# Patient Record
Sex: FEMALE | Race: OTHER | HISPANIC OR LATINO | URBAN - METROPOLITAN AREA
[De-identification: names, ages, dates, MRNs, and addresses within clinical notes are randomized per-mention and may not be internally consistent; named-entity substitution may affect disease eponyms.]

---

## 2017-06-19 ENCOUNTER — EMERGENCY (EMERGENCY)
Facility: HOSPITAL | Age: 37
LOS: 1 days | Discharge: HOME | End: 2017-06-19
Attending: EMERGENCY MEDICINE
Payer: COMMERCIAL

## 2017-06-19 VITALS
RESPIRATION RATE: 16 BRPM | HEART RATE: 71 BPM | OXYGEN SATURATION: 99 % | WEIGHT: 162.04 LBS | DIASTOLIC BLOOD PRESSURE: 78 MMHG | HEIGHT: 61 IN | TEMPERATURE: 99 F | SYSTOLIC BLOOD PRESSURE: 103 MMHG

## 2017-06-19 VITALS
TEMPERATURE: 98 F | DIASTOLIC BLOOD PRESSURE: 75 MMHG | SYSTOLIC BLOOD PRESSURE: 110 MMHG | OXYGEN SATURATION: 100 % | RESPIRATION RATE: 16 BRPM | HEART RATE: 70 BPM

## 2017-06-19 DIAGNOSIS — N39.0 URINARY TRACT INFECTION, SITE NOT SPECIFIED: ICD-10-CM

## 2017-06-19 DIAGNOSIS — J45.909 UNSPECIFIED ASTHMA, UNCOMPLICATED: ICD-10-CM

## 2017-06-19 DIAGNOSIS — R10.9 UNSPECIFIED ABDOMINAL PAIN: ICD-10-CM

## 2017-06-19 DIAGNOSIS — Z91.018 ALLERGY TO OTHER FOODS: ICD-10-CM

## 2017-06-19 LAB
ANION GAP SERPL CALC-SCNC: 7 MMOL/L — SIGNIFICANT CHANGE UP (ref 5–17)
APPEARANCE UR: CLEAR — SIGNIFICANT CHANGE UP
BASOPHILS # BLD AUTO: 0.1 K/UL — SIGNIFICANT CHANGE UP (ref 0–0.2)
BASOPHILS NFR BLD AUTO: 1.3 % — SIGNIFICANT CHANGE UP (ref 0–2)
BILIRUB UR-MCNC: NEGATIVE — SIGNIFICANT CHANGE UP
BUN SERPL-MCNC: 13 MG/DL — SIGNIFICANT CHANGE UP (ref 7–18)
CALCIUM SERPL-MCNC: 8.9 MG/DL — SIGNIFICANT CHANGE UP (ref 8.4–10.5)
CHLORIDE SERPL-SCNC: 105 MMOL/L — SIGNIFICANT CHANGE UP (ref 96–108)
CO2 SERPL-SCNC: 25 MMOL/L — SIGNIFICANT CHANGE UP (ref 22–31)
COLOR SPEC: YELLOW — SIGNIFICANT CHANGE UP
CREAT SERPL-MCNC: 0.65 MG/DL — SIGNIFICANT CHANGE UP (ref 0.5–1.3)
DIFF PNL FLD: ABNORMAL
EOSINOPHIL # BLD AUTO: 0.1 K/UL — SIGNIFICANT CHANGE UP (ref 0–0.5)
EOSINOPHIL NFR BLD AUTO: 1.2 % — SIGNIFICANT CHANGE UP (ref 0–6)
GLUCOSE SERPL-MCNC: 102 MG/DL — HIGH (ref 70–99)
GLUCOSE UR QL: NEGATIVE — SIGNIFICANT CHANGE UP
HCG UR QL: NEGATIVE — SIGNIFICANT CHANGE UP
HCT VFR BLD CALC: 43.9 % — SIGNIFICANT CHANGE UP (ref 34.5–45)
HGB BLD-MCNC: 15.2 G/DL — SIGNIFICANT CHANGE UP (ref 11.5–15.5)
KETONES UR-MCNC: NEGATIVE — SIGNIFICANT CHANGE UP
LACTATE SERPL-SCNC: 1.1 MMOL/L — SIGNIFICANT CHANGE UP (ref 0.7–2)
LEUKOCYTE ESTERASE UR-ACNC: ABNORMAL
LYMPHOCYTES # BLD AUTO: 2.1 K/UL — SIGNIFICANT CHANGE UP (ref 1–3.3)
LYMPHOCYTES # BLD AUTO: 23.5 % — SIGNIFICANT CHANGE UP (ref 13–44)
MCHC RBC-ENTMCNC: 32.7 PG — SIGNIFICANT CHANGE UP (ref 27–34)
MCHC RBC-ENTMCNC: 34.6 GM/DL — SIGNIFICANT CHANGE UP (ref 32–36)
MCV RBC AUTO: 94.3 FL — SIGNIFICANT CHANGE UP (ref 80–100)
MONOCYTES # BLD AUTO: 0.5 K/UL — SIGNIFICANT CHANGE UP (ref 0–0.9)
MONOCYTES NFR BLD AUTO: 6.2 % — SIGNIFICANT CHANGE UP (ref 2–14)
NEUTROPHILS # BLD AUTO: 5.9 K/UL — SIGNIFICANT CHANGE UP (ref 1.8–7.4)
NEUTROPHILS NFR BLD AUTO: 67.8 % — SIGNIFICANT CHANGE UP (ref 43–77)
NITRITE UR-MCNC: NEGATIVE — SIGNIFICANT CHANGE UP
PH UR: 6 — SIGNIFICANT CHANGE UP (ref 5–8)
PLATELET # BLD AUTO: 341 K/UL — SIGNIFICANT CHANGE UP (ref 150–400)
POTASSIUM SERPL-MCNC: 4 MMOL/L — SIGNIFICANT CHANGE UP (ref 3.5–5.3)
POTASSIUM SERPL-SCNC: 4 MMOL/L — SIGNIFICANT CHANGE UP (ref 3.5–5.3)
PROT UR-MCNC: 15
RBC # BLD: 4.65 M/UL — SIGNIFICANT CHANGE UP (ref 3.8–5.2)
RBC # FLD: 13.1 % — SIGNIFICANT CHANGE UP (ref 10.3–14.5)
SODIUM SERPL-SCNC: 137 MMOL/L — SIGNIFICANT CHANGE UP (ref 135–145)
SP GR SPEC: 1.02 — SIGNIFICANT CHANGE UP (ref 1.01–1.02)
UROBILINOGEN FLD QL: NEGATIVE — SIGNIFICANT CHANGE UP
WBC # BLD: 8.7 K/UL — SIGNIFICANT CHANGE UP (ref 3.8–10.5)
WBC # FLD AUTO: 8.7 K/UL — SIGNIFICANT CHANGE UP (ref 3.8–10.5)

## 2017-06-19 PROCEDURE — 83605 ASSAY OF LACTIC ACID: CPT

## 2017-06-19 PROCEDURE — 74176 CT ABD & PELVIS W/O CONTRAST: CPT | Mod: 26

## 2017-06-19 PROCEDURE — 81025 URINE PREGNANCY TEST: CPT

## 2017-06-19 PROCEDURE — 99285 EMERGENCY DEPT VISIT HI MDM: CPT | Mod: 25

## 2017-06-19 PROCEDURE — 74176 CT ABD & PELVIS W/O CONTRAST: CPT

## 2017-06-19 PROCEDURE — 87040 BLOOD CULTURE FOR BACTERIA: CPT

## 2017-06-19 PROCEDURE — 96374 THER/PROPH/DIAG INJ IV PUSH: CPT

## 2017-06-19 PROCEDURE — 81001 URINALYSIS AUTO W/SCOPE: CPT

## 2017-06-19 PROCEDURE — 87086 URINE CULTURE/COLONY COUNT: CPT

## 2017-06-19 PROCEDURE — 85027 COMPLETE CBC AUTOMATED: CPT

## 2017-06-19 PROCEDURE — 80048 BASIC METABOLIC PNL TOTAL CA: CPT

## 2017-06-19 RX ORDER — ALBUTEROL 90 UG/1
0 AEROSOL, METERED ORAL
Qty: 0 | Refills: 0 | COMMUNITY

## 2017-06-19 RX ORDER — SODIUM CHLORIDE 9 MG/ML
1000 INJECTION INTRAMUSCULAR; INTRAVENOUS; SUBCUTANEOUS ONCE
Qty: 0 | Refills: 0 | Status: COMPLETED | OUTPATIENT
Start: 2017-06-19 | End: 2017-06-19

## 2017-06-19 RX ORDER — SODIUM CHLORIDE 9 MG/ML
3 INJECTION INTRAMUSCULAR; INTRAVENOUS; SUBCUTANEOUS ONCE
Qty: 0 | Refills: 0 | Status: COMPLETED | OUTPATIENT
Start: 2017-06-19 | End: 2017-06-19

## 2017-06-19 RX ORDER — CEFTRIAXONE 500 MG/1
1 INJECTION, POWDER, FOR SOLUTION INTRAMUSCULAR; INTRAVENOUS ONCE
Qty: 0 | Refills: 0 | Status: COMPLETED | OUTPATIENT
Start: 2017-06-19 | End: 2017-06-19

## 2017-06-19 RX ORDER — CEFUROXIME AXETIL 250 MG
1 TABLET ORAL
Qty: 10 | Refills: 0 | OUTPATIENT
Start: 2017-06-19 | End: 2017-06-24

## 2017-06-19 RX ORDER — ACETAMINOPHEN 500 MG
2 TABLET ORAL
Qty: 20 | Refills: 0 | OUTPATIENT
Start: 2017-06-19

## 2017-06-19 RX ADMIN — SODIUM CHLORIDE 3000 MILLILITER(S): 9 INJECTION INTRAMUSCULAR; INTRAVENOUS; SUBCUTANEOUS at 20:44

## 2017-06-19 RX ADMIN — CEFTRIAXONE 100 GRAM(S): 500 INJECTION, POWDER, FOR SOLUTION INTRAMUSCULAR; INTRAVENOUS at 21:44

## 2017-06-19 RX ADMIN — SODIUM CHLORIDE 3 MILLILITER(S): 9 INJECTION INTRAMUSCULAR; INTRAVENOUS; SUBCUTANEOUS at 21:44

## 2017-06-19 NOTE — ED PROVIDER NOTE - PHYSICAL EXAMINATION
Left flank tenderenss, No cervical, thoracic or lumbosacral midline bony deformities,  +rotation and flexion-extension of neck and truncal area intact.

## 2017-06-19 NOTE — ED PROVIDER NOTE - MEDICAL DECISION MAKING DETAILS
Pt feels better, no distress, Pt is well appearing walking with normal gait, stable for discharge and follow up with medical doctor. Pt educated on care and need for follow up. Discussed anticipatory guidance and return precautions. Questions answered. I had a detailed discussion with the patient and/or guardian regarding the historical points, exam findings, and any diagnostic results supporting the discharge diagnosis. Rx Ceftin, f/u w/ Dr. Auguste.

## 2017-06-21 LAB
CULTURE RESULTS: SIGNIFICANT CHANGE UP
SPECIMEN SOURCE: SIGNIFICANT CHANGE UP

## 2017-06-25 LAB
CULTURE RESULTS: SIGNIFICANT CHANGE UP
CULTURE RESULTS: SIGNIFICANT CHANGE UP
SPECIMEN SOURCE: SIGNIFICANT CHANGE UP
SPECIMEN SOURCE: SIGNIFICANT CHANGE UP

## 2017-07-11 ENCOUNTER — EMERGENCY (EMERGENCY)
Facility: HOSPITAL | Age: 37
LOS: 1 days | Discharge: HOME | End: 2017-07-11
Attending: INTERNAL MEDICINE
Payer: COMMERCIAL

## 2017-07-11 VITALS
RESPIRATION RATE: 20 BRPM | DIASTOLIC BLOOD PRESSURE: 50 MMHG | WEIGHT: 160.06 LBS | OXYGEN SATURATION: 99 % | SYSTOLIC BLOOD PRESSURE: 107 MMHG | TEMPERATURE: 103 F | HEIGHT: 61 IN | HEART RATE: 101 BPM

## 2017-07-11 VITALS
DIASTOLIC BLOOD PRESSURE: 64 MMHG | TEMPERATURE: 100 F | SYSTOLIC BLOOD PRESSURE: 98 MMHG | HEART RATE: 97 BPM | RESPIRATION RATE: 20 BRPM | OXYGEN SATURATION: 99 %

## 2017-07-11 DIAGNOSIS — R07.2 PRECORDIAL PAIN: ICD-10-CM

## 2017-07-11 DIAGNOSIS — J02.9 ACUTE PHARYNGITIS, UNSPECIFIED: ICD-10-CM

## 2017-07-11 DIAGNOSIS — J45.909 UNSPECIFIED ASTHMA, UNCOMPLICATED: ICD-10-CM

## 2017-07-11 DIAGNOSIS — Z91.018 ALLERGY TO OTHER FOODS: ICD-10-CM

## 2017-07-11 PROCEDURE — 99284 EMERGENCY DEPT VISIT MOD MDM: CPT | Mod: 25

## 2017-07-11 PROCEDURE — 93005 ELECTROCARDIOGRAM TRACING: CPT

## 2017-07-11 PROCEDURE — 96374 THER/PROPH/DIAG INJ IV PUSH: CPT

## 2017-07-11 PROCEDURE — 99285 EMERGENCY DEPT VISIT HI MDM: CPT

## 2017-07-11 PROCEDURE — 96375 TX/PRO/DX INJ NEW DRUG ADDON: CPT

## 2017-07-11 RX ORDER — AMOXICILLIN 250 MG/5ML
500 SUSPENSION, RECONSTITUTED, ORAL (ML) ORAL ONCE
Qty: 0 | Refills: 0 | Status: COMPLETED | OUTPATIENT
Start: 2017-07-11 | End: 2017-07-11

## 2017-07-11 RX ORDER — DEXAMETHASONE 0.5 MG/5ML
6 ELIXIR ORAL ONCE
Qty: 0 | Refills: 0 | Status: COMPLETED | OUTPATIENT
Start: 2017-07-11 | End: 2017-07-11

## 2017-07-11 RX ORDER — SODIUM CHLORIDE 9 MG/ML
2000 INJECTION INTRAMUSCULAR; INTRAVENOUS; SUBCUTANEOUS ONCE
Qty: 0 | Refills: 0 | Status: COMPLETED | OUTPATIENT
Start: 2017-07-11 | End: 2017-07-11

## 2017-07-11 RX ORDER — KETOROLAC TROMETHAMINE 30 MG/ML
30 SYRINGE (ML) INJECTION ONCE
Qty: 0 | Refills: 0 | Status: DISCONTINUED | OUTPATIENT
Start: 2017-07-11 | End: 2017-07-11

## 2017-07-11 RX ORDER — ACETAMINOPHEN 500 MG
1000 TABLET ORAL ONCE
Qty: 0 | Refills: 0 | Status: COMPLETED | OUTPATIENT
Start: 2017-07-11 | End: 2017-07-11

## 2017-07-11 RX ORDER — AMOXICILLIN 250 MG/5ML
1 SUSPENSION, RECONSTITUTED, ORAL (ML) ORAL
Qty: 14 | Refills: 0 | OUTPATIENT
Start: 2017-07-11 | End: 2017-07-18

## 2017-07-11 RX ADMIN — Medication 500 MILLIGRAM(S): at 19:33

## 2017-07-11 RX ADMIN — Medication 1000 MILLIGRAM(S): at 19:33

## 2017-07-11 RX ADMIN — SODIUM CHLORIDE 2000 MILLILITER(S): 9 INJECTION INTRAMUSCULAR; INTRAVENOUS; SUBCUTANEOUS at 19:34

## 2017-07-11 RX ADMIN — Medication 6 MILLIGRAM(S): at 19:33

## 2017-07-11 RX ADMIN — Medication 30 MILLIGRAM(S): at 19:33

## 2017-07-11 RX ADMIN — Medication 30 MILLIGRAM(S): at 19:34

## 2017-07-11 NOTE — ED PROVIDER NOTE - CHPI ED SYMPTOMS POS
HA, general body aches, sore throat, mid-sternal chest pain, nausea, pain under L arm, pain with swallowing/CHILLS/FEVER

## 2017-07-11 NOTE — ED PROVIDER NOTE - HEME LYMPH, MLM
No adenopathy or splenomegaly. No cervical or inguinal lymphadenopathy. Minimal sub-centimeter, tender, mobile axial lymph node and anterior cervical chain.

## 2017-07-11 NOTE — ED PROVIDER NOTE - PROGRESS NOTE DETAILS
Feeling much better s/p IVF/meds. Pt ambulatory, tolerating PO, requesting d/c home. She will f/u with PMD in 2 days.

## 2017-07-11 NOTE — ED PROVIDER NOTE - OBJECTIVE STATEMENT
38 y/o F pt with PMHx of Asthma (on ProAir) and no significant PSHx presents to ED c/o fever (Tmax 103.6 F), chills, HA, general body aches, sore throat, and mid-sternal CP since yesterday. Pt also reports associated nausea, pain under L arm, and pain with swallowing. Pt took Advil x2 yesterday, as well as Nyquil x2 today with no relief of sx's. Pt denies cough, vomiting, abd pain, or any other complaints. Pt also denies having similar CP in the past. 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 NKDA. Pharmacy: [facility] (zip code: [id number]).

## 2017-07-11 NOTE — ED PROVIDER NOTE - MEDICAL DECISION MAKING DETAILS
38 y/o F pt presents with exudative pharyngitis. WIll give fluids, pain medication, anti-pyretic, steroid, Abx, and d/c home with outpatient f/u.

## 2018-02-07 ENCOUNTER — TRANSCRIPTION ENCOUNTER (OUTPATIENT)
Age: 38
End: 2018-02-07

## 2018-03-29 ENCOUNTER — TRANSCRIPTION ENCOUNTER (OUTPATIENT)
Age: 38
End: 2018-03-29

## 2018-12-04 ENCOUNTER — EMERGENCY (EMERGENCY)
Facility: HOSPITAL | Age: 38
LOS: 1 days | Discharge: HOME | End: 2018-12-04
Attending: EMERGENCY MEDICINE
Payer: COMMERCIAL

## 2018-12-04 VITALS
WEIGHT: 160.06 LBS | DIASTOLIC BLOOD PRESSURE: 69 MMHG | HEIGHT: 61 IN | SYSTOLIC BLOOD PRESSURE: 105 MMHG | TEMPERATURE: 98 F | HEART RATE: 66 BPM | OXYGEN SATURATION: 98 % | RESPIRATION RATE: 18 BRPM

## 2018-12-04 PROCEDURE — 99285 EMERGENCY DEPT VISIT HI MDM: CPT | Mod: 25

## 2018-12-04 PROCEDURE — 94640 AIRWAY INHALATION TREATMENT: CPT

## 2018-12-04 PROCEDURE — 99284 EMERGENCY DEPT VISIT MOD MDM: CPT

## 2018-12-04 RX ORDER — IPRATROPIUM/ALBUTEROL SULFATE 18-103MCG
3 AEROSOL WITH ADAPTER (GRAM) INHALATION
Qty: 0 | Refills: 0 | Status: COMPLETED | OUTPATIENT
Start: 2018-12-04 | End: 2018-12-04

## 2018-12-04 RX ORDER — FLUTICASONE PROPIONATE AND SALMETEROL 50; 250 UG/1; UG/1
1 POWDER ORAL; RESPIRATORY (INHALATION)
Qty: 1 | Refills: 0 | OUTPATIENT
Start: 2018-12-04 | End: 2019-01-02

## 2018-12-04 RX ORDER — ALBUTEROL 90 UG/1
2 AEROSOL, METERED ORAL
Qty: 1 | Refills: 0 | OUTPATIENT
Start: 2018-12-04

## 2018-12-04 RX ADMIN — Medication 3 MILLILITER(S): at 11:11

## 2018-12-04 RX ADMIN — Medication 3 MILLILITER(S): at 09:55

## 2018-12-04 RX ADMIN — Medication 60 MILLIGRAM(S): at 10:13

## 2018-12-04 RX ADMIN — Medication 3 MILLILITER(S): at 09:38

## 2018-12-04 NOTE — ED PROVIDER NOTE - PROGRESS NOTE DETAILS
Pt has improved and wheezing and shortness of breath have resolved. Pre-treatment peak flow was 240 and  post treatment was 500.

## 2018-12-04 NOTE — ED PROVIDER NOTE - MEDICAL DECISION MAKING DETAILS
37 y/o F with a PMHx of asthma presents to the ED with asthma exacerbation. Pt's symptoms have improved after Duoneb and albuterol treatments. Will prescribe Advair, albuterol, and steroids. Pt is to     f/u with PCP in 1-2 days. Pt should return to the ED if feeling worse, if not feeling better, or for any new or worsening symptoms.

## 2018-12-04 NOTE — ED PROVIDER NOTE - OBJECTIVE STATEMENT
37 y/o F with a significant PMHx of asthma and no significant PSHx presents to the ED with c/o wheezing, SOB, and dry cough x 1 week. Pt states she initially had relief with albuterol but symptoms recurred. Pt notes she ran out of Advair last month. Pt reports that symptoms are similar to her typical asthma exacerbations. Pt endorses she has never been in ICU or intubated. Pt denies fevers, chest pain, nausea, vomiting, diarrhea, abd pain, or any other complaints. NKDA.

## 2018-12-04 NOTE — ED ADULT TRIAGE NOTE - CHIEF COMPLAINT QUOTE
as per the pt " my proair for the asthma is not working  I have asthma attack for 1 week ' peak flow 240

## 2019-12-04 NOTE — ED ADULT NURSE NOTE - CAS DISCH ACCOMP BY
I returned the patient's phone call and left a voicemail message stating that the company's I utilize are [employer] the models are not the same as 2018, they have changed since then.   Self

## 2020-03-15 ENCOUNTER — EMERGENCY (EMERGENCY)
Facility: HOSPITAL | Age: 40
LOS: 1 days | Discharge: HOME | End: 2020-03-15
Attending: EMERGENCY MEDICINE
Payer: COMMERCIAL

## 2020-03-15 VITALS
TEMPERATURE: 99 F | HEART RATE: 88 BPM | OXYGEN SATURATION: 98 % | WEIGHT: 162.04 LBS | DIASTOLIC BLOOD PRESSURE: 64 MMHG | SYSTOLIC BLOOD PRESSURE: 115 MMHG | RESPIRATION RATE: 16 BRPM | HEIGHT: 61 IN

## 2020-03-15 PROCEDURE — 99284 EMERGENCY DEPT VISIT MOD MDM: CPT

## 2020-03-15 PROCEDURE — 71046 X-RAY EXAM CHEST 2 VIEWS: CPT | Mod: 26

## 2020-03-15 RX ORDER — ACETAMINOPHEN 500 MG
650 TABLET ORAL ONCE
Refills: 0 | Status: COMPLETED | OUTPATIENT
Start: 2020-03-15 | End: 2020-03-15

## 2020-03-15 RX ADMIN — Medication 650 MILLIGRAM(S): at 21:20

## 2020-03-15 NOTE — ED ADULT TRIAGE NOTE - WEIGHT IN KG
[FreeTextEntry8] : Pt has swelling and tenderness over the Right MCP joint for 2 days.\par Also here to check BP after HCTZ was added at the last visit.
73.5

## 2020-03-15 NOTE — ED ADULT NURSE NOTE - NSIMPLEMENTINTERV_GEN_ALL_ED
Implemented All Universal Safety Interventions:  [place] to call system. Call bell, personal items and telephone within reach. Instruct patient to call for assistance. Room bathroom lighting operational. Non-slip footwear when patient is off stretcher. Physically safe environment: no spills, clutter or unnecessary equipment. Stretcher in lowest position, wheels locked, appropriate side rails in place.

## 2020-03-15 NOTE — ED ADULT NURSE NOTE - OBJECTIVE STATEMENT
pt is here for fever.  pt stated that fever starting a few hours ago, c/o chills and body ache, H/O Asthma, denied N/V/D or sob, c/o body ache 7-9/10, no distress noted at this time.

## 2020-03-16 VITALS
HEART RATE: 80 BPM | SYSTOLIC BLOOD PRESSURE: 113 MMHG | TEMPERATURE: 99 F | RESPIRATION RATE: 18 BRPM | OXYGEN SATURATION: 100 % | DIASTOLIC BLOOD PRESSURE: 67 MMHG

## 2020-03-16 LAB
ALBUMIN SERPL ELPH-MCNC: 3.8 G/DL — SIGNIFICANT CHANGE UP (ref 3.5–5)
ALP SERPL-CCNC: 71 U/L — SIGNIFICANT CHANGE UP (ref 40–120)
ALT FLD-CCNC: 27 U/L DA — SIGNIFICANT CHANGE UP (ref 10–60)
ANION GAP SERPL CALC-SCNC: 7 MMOL/L — SIGNIFICANT CHANGE UP (ref 5–17)
AST SERPL-CCNC: 17 U/L — SIGNIFICANT CHANGE UP (ref 10–40)
BASOPHILS # BLD AUTO: 0.05 K/UL — SIGNIFICANT CHANGE UP (ref 0–0.2)
BASOPHILS NFR BLD AUTO: 0.9 % — SIGNIFICANT CHANGE UP (ref 0–2)
BILIRUB SERPL-MCNC: 0.6 MG/DL — SIGNIFICANT CHANGE UP (ref 0.2–1.2)
BUN SERPL-MCNC: 15 MG/DL — SIGNIFICANT CHANGE UP (ref 7–18)
CALCIUM SERPL-MCNC: 8.5 MG/DL — SIGNIFICANT CHANGE UP (ref 8.4–10.5)
CHLORIDE SERPL-SCNC: 106 MMOL/L — SIGNIFICANT CHANGE UP (ref 96–108)
CO2 SERPL-SCNC: 25 MMOL/L — SIGNIFICANT CHANGE UP (ref 22–31)
CREAT SERPL-MCNC: 0.71 MG/DL — SIGNIFICANT CHANGE UP (ref 0.5–1.3)
D DIMER BLD IA.RAPID-MCNC: <150 NG/ML DDU — SIGNIFICANT CHANGE UP
EOSINOPHIL # BLD AUTO: 0.11 K/UL — SIGNIFICANT CHANGE UP (ref 0–0.5)
EOSINOPHIL NFR BLD AUTO: 1.9 % — SIGNIFICANT CHANGE UP (ref 0–6)
GLUCOSE SERPL-MCNC: 91 MG/DL — SIGNIFICANT CHANGE UP (ref 70–99)
HCT VFR BLD CALC: 40.9 % — SIGNIFICANT CHANGE UP (ref 34.5–45)
HGB BLD-MCNC: 13.7 G/DL — SIGNIFICANT CHANGE UP (ref 11.5–15.5)
IMM GRANULOCYTES NFR BLD AUTO: 0.4 % — SIGNIFICANT CHANGE UP (ref 0–1.5)
LYMPHOCYTES # BLD AUTO: 1.27 K/UL — SIGNIFICANT CHANGE UP (ref 1–3.3)
LYMPHOCYTES # BLD AUTO: 22.4 % — SIGNIFICANT CHANGE UP (ref 13–44)
MCHC RBC-ENTMCNC: 31.4 PG — SIGNIFICANT CHANGE UP (ref 27–34)
MCHC RBC-ENTMCNC: 33.5 GM/DL — SIGNIFICANT CHANGE UP (ref 32–36)
MCV RBC AUTO: 93.6 FL — SIGNIFICANT CHANGE UP (ref 80–100)
MONOCYTES # BLD AUTO: 0.84 K/UL — SIGNIFICANT CHANGE UP (ref 0–0.9)
MONOCYTES NFR BLD AUTO: 14.8 % — HIGH (ref 2–14)
NEUTROPHILS # BLD AUTO: 3.38 K/UL — SIGNIFICANT CHANGE UP (ref 1.8–7.4)
NEUTROPHILS NFR BLD AUTO: 59.6 % — SIGNIFICANT CHANGE UP (ref 43–77)
NRBC # BLD: 0 /100 WBCS — SIGNIFICANT CHANGE UP (ref 0–0)
PLATELET # BLD AUTO: 288 K/UL — SIGNIFICANT CHANGE UP (ref 150–400)
POTASSIUM SERPL-MCNC: 4.1 MMOL/L — SIGNIFICANT CHANGE UP (ref 3.5–5.3)
POTASSIUM SERPL-SCNC: 4.1 MMOL/L — SIGNIFICANT CHANGE UP (ref 3.5–5.3)
PROT SERPL-MCNC: 7.5 G/DL — SIGNIFICANT CHANGE UP (ref 6–8.3)
RBC # BLD: 4.37 M/UL — SIGNIFICANT CHANGE UP (ref 3.8–5.2)
RBC # FLD: 13.4 % — SIGNIFICANT CHANGE UP (ref 10.3–14.5)
SODIUM SERPL-SCNC: 138 MMOL/L — SIGNIFICANT CHANGE UP (ref 135–145)
WBC # BLD: 5.67 K/UL — SIGNIFICANT CHANGE UP (ref 3.8–10.5)
WBC # FLD AUTO: 5.67 K/UL — SIGNIFICANT CHANGE UP (ref 3.8–10.5)

## 2020-03-16 PROCEDURE — 36415 COLL VENOUS BLD VENIPUNCTURE: CPT

## 2020-03-16 PROCEDURE — 85379 FIBRIN DEGRADATION QUANT: CPT

## 2020-03-16 PROCEDURE — 85027 COMPLETE CBC AUTOMATED: CPT

## 2020-03-16 PROCEDURE — 84702 CHORIONIC GONADOTROPIN TEST: CPT

## 2020-03-16 PROCEDURE — 71046 X-RAY EXAM CHEST 2 VIEWS: CPT

## 2020-03-16 PROCEDURE — 96374 THER/PROPH/DIAG INJ IV PUSH: CPT

## 2020-03-16 PROCEDURE — 99284 EMERGENCY DEPT VISIT MOD MDM: CPT | Mod: 25

## 2020-03-16 PROCEDURE — 80053 COMPREHEN METABOLIC PANEL: CPT

## 2020-03-16 RX ORDER — SODIUM CHLORIDE 9 MG/ML
1000 INJECTION INTRAMUSCULAR; INTRAVENOUS; SUBCUTANEOUS ONCE
Refills: 0 | Status: COMPLETED | OUTPATIENT
Start: 2020-03-16 | End: 2020-03-16

## 2020-03-16 RX ORDER — IBUPROFEN 200 MG
400 TABLET ORAL ONCE
Refills: 0 | Status: COMPLETED | OUTPATIENT
Start: 2020-03-16 | End: 2020-03-16

## 2020-03-16 RX ORDER — KETOROLAC TROMETHAMINE 30 MG/ML
30 SYRINGE (ML) INJECTION ONCE
Refills: 0 | Status: DISCONTINUED | OUTPATIENT
Start: 2020-03-16 | End: 2020-03-16

## 2020-03-16 RX ADMIN — SODIUM CHLORIDE 1000 MILLILITER(S): 9 INJECTION INTRAMUSCULAR; INTRAVENOUS; SUBCUTANEOUS at 02:53

## 2020-03-16 RX ADMIN — Medication 30 MILLIGRAM(S): at 02:54

## 2020-03-16 RX ADMIN — Medication 400 MILLIGRAM(S): at 02:54

## 2020-03-16 RX ADMIN — Medication 650 MILLIGRAM(S): at 02:53

## 2020-03-16 NOTE — ED PROVIDER NOTE - OBJECTIVE STATEMENT
39 y.o. female LMP 2/22, pt c/o fever today.  pt claims 2 weeks ago pt had fever, saw PMD, told pt had flu, given Tamiflu, pt with improvement until today.  fever, cough, coughs up whitish, yellowish sputum, myalgia, chills, throat itchiness, no nasal congestion.  Pt went to [place] from 2/10-23.  No sick contact.  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

## 2020-03-16 NOTE — ED PROVIDER NOTE - PROGRESS NOTE DETAILS
d-dimer-neg, pt in no resp. distress, pt later added that she has been experiencing sob on & off since returning from [place], d-dimer-neg, pt in no resp. distress, feeling much better, will d/c home, advised to f/u with PMD

## 2020-03-16 NOTE — ED PROVIDER NOTE - PATIENT PORTAL LINK FT
LABS:                        12.4   8.8   )-----------( 274      ( 24 Jul 2017 23:16 )             37.3     07-24    130<L>  |  89<L>  |  46<H>  ----------------------------<  111<H>  5.8<H>   |  21<L>  |  7.00<H>    Ca    9.7      24 Jul 2017 23:16    TPro  8.9<H>  /  Alb  4.5  /  TBili  0.4  /  DBili  x   /  AST  29  /  ALT  10  /  AlkPhos  192<H>  07-24          RADIOLOGY & ADDITIONAL TESTS:    CXR 7/25/2017 LABS:                        12.4   8.8   )-----------( 274      ( 24 Jul 2017 23:16 )             37.3     07-24    130<L>  |  89<L>  |  46<H>  ----------------------------<  111<H>  5.8<H>   |  21<L>  |  7.00<H>    Ca    9.7      24 Jul 2017 23:16    TPro  8.9<H>  /  Alb  4.5  /  TBili  0.4  /  DBili  x   /  AST  29  /  ALT  10  /  AlkPhos  192<H>  07-24          RADIOLOGY & ADDITIONAL TESTS:    CXR 7/25/2017    EKG  no peaked T's, normal IA interval, prolonged QT interval, no widened QRS LABS:                        12.4   8.8   )-----------( 274      ( 24 Jul 2017 23:16 )             37.3     07-24    130<L>  |  89<L>  |  46<H>  ----------------------------<  111<H>  5.8<H>   |  21<L>  |  7.00<H>    Ca    9.7      24 Jul 2017 23:16    TPro  8.9<H>  /  Alb  4.5  /  TBili  0.4  /  DBili  x   /  AST  29  /  ALT  10  /  AlkPhos  192<H>  07-24          RADIOLOGY & ADDITIONAL TESTS:    CXR 7/25/2017  Pleural effusion see in left lung    EKG  no peaked T's, normal VA interval, prolonged QT interval, no widened QRS You can access the FollowMyHealth Patient Portal offered by [facility] by registering at the following website: [email or web address]. By joining [employer]’s FollowMyHealth portal, you will also be able to view your health information using other applications (apps) compatible with our system. LABS:                        12.4   8.8   )-----------( 274      ( 24 Jul 2017 23:16 )             37.3     07-24    130<L>  |  89<L>  |  46<H>  ----------------------------<  111<H>  5.8<H>   |  21<L>  |  7.00<H>    Ca    9.7      24 Jul 2017 23:16    TPro  8.9<H>  /  Alb  4.5  /  TBili  0.4  /  DBili  x   /  AST  29  /  ALT  10  /  AlkPhos  192<H>  07-24          RADIOLOGY & ADDITIONAL TESTS:    CXR 7/25/2017  Pleural effusion L>R    EKG  no peaked T's, normal SD interval, prolonged QT interval, no widened QRS

## 2020-10-12 NOTE — ED ADULT NURSE NOTE - PSYCHOSOCIAL WDL
Pt called he is getting his Imuran and Prednisone refilled  They will be calling us re Medicare info ? Forms needed    Alert and oriented x 3, normal mood and affect, no apparent risk to self or others.

## 2021-10-27 ENCOUNTER — EMERGENCY (EMERGENCY)
Facility: HOSPITAL | Age: 41
LOS: 1 days | Discharge: HOME | End: 2021-10-27
Attending: EMERGENCY MEDICINE
Payer: COMMERCIAL

## 2021-10-27 VITALS
HEART RATE: 70 BPM | WEIGHT: 160.06 LBS | OXYGEN SATURATION: 97 % | TEMPERATURE: 98 F | DIASTOLIC BLOOD PRESSURE: 75 MMHG | SYSTOLIC BLOOD PRESSURE: 117 MMHG | RESPIRATION RATE: 19 BRPM | HEIGHT: 61 IN

## 2021-10-27 PROCEDURE — 71045 X-RAY EXAM CHEST 1 VIEW: CPT

## 2021-10-27 PROCEDURE — 93005 ELECTROCARDIOGRAM TRACING: CPT

## 2021-10-27 PROCEDURE — 99284 EMERGENCY DEPT VISIT MOD MDM: CPT

## 2021-10-27 PROCEDURE — 99284 EMERGENCY DEPT VISIT MOD MDM: CPT | Mod: 25

## 2021-10-27 PROCEDURE — 94640 AIRWAY INHALATION TREATMENT: CPT

## 2021-10-27 PROCEDURE — 71045 X-RAY EXAM CHEST 1 VIEW: CPT | Mod: 26

## 2021-10-27 RX ORDER — ALBUTEROL 90 UG/1
3 AEROSOL, METERED ORAL
Qty: 80 | Refills: 0
Start: 2021-10-27 | End: 2021-11-05

## 2021-10-27 RX ORDER — ALBUTEROL 90 UG/1
2 AEROSOL, METERED ORAL ONCE
Refills: 0 | Status: COMPLETED | OUTPATIENT
Start: 2021-10-27 | End: 2021-10-27

## 2021-10-27 RX ORDER — ALBUTEROL 90 UG/1
2.5 AEROSOL, METERED ORAL ONCE
Refills: 0 | Status: COMPLETED | OUTPATIENT
Start: 2021-10-27 | End: 2021-10-27

## 2021-10-27 RX ADMIN — ALBUTEROL 2.5 MILLIGRAM(S): 90 AEROSOL, METERED ORAL at 22:15

## 2021-10-27 RX ADMIN — ALBUTEROL 2 PUFF(S): 90 AEROSOL, METERED ORAL at 22:39

## 2021-10-27 NOTE — ED PROVIDER NOTE - CARE PLAN
1 Principal Discharge DX:	2019 novel coronavirus disease (COVID-19)  Secondary Diagnosis:	Atypical chest pain

## 2021-10-27 NOTE — ED PROVIDER NOTE - OBJECTIVE STATEMENT
41 y.o. female LMP 10/4, pt claims received Moderna in Feb, 21. Pt c/o sneezing on Sun., itchy throat.  Pt with fever, chills, dry cough, myalgia, dizziness since Mon.  Pt tested positive for COVID.  Pt c/o MSCP upon coughing, wheezing last night, pt used inhaler with temp relief.  Pt took tylenol , last dose last night.  Pt's given Medrol pack, zpak, & Tessalon perle on Mon., pt started taking Medrol since yest., & Zithromax today

## 2021-10-27 NOTE — ED PROVIDER NOTE - PATIENT PORTAL LINK FT
You can access the FollowMyHealth Patient Portal offered by [facility] by registering at the following website: [email or web address]. By joining [employer]’s FollowMyHealth portal, you will also be able to view your health information using other applications (apps) compatible with our system.

## 2021-10-27 NOTE — ED ADULT NURSE NOTE - OBJECTIVE STATEMENT
As per pt, c/o f/c, CP, SOB, productive cough, and pt admits to testing positive covid 10/25/2021. PT denies all other symptoms.

## 2021-10-27 NOTE — ED PROVIDER NOTE - CLINICAL SUMMARY MEDICAL DECISION MAKING FREE TEXT BOX
pt s/p vaccine, now with COVID, h/o asthma.  Pt with atypical CP, will get CXR, give treatment, reassess

## 2021-10-28 PROBLEM — K29.70 GASTRITIS, UNSPECIFIED, WITHOUT BLEEDING: Chronic | Status: ACTIVE | Noted: 2020-03-16

## 2023-02-27 NOTE — ED ADULT NURSE NOTE - CAS DISCH TRANSFER METHOD
Problem: Chronic Conditions and Co-morbidities  Goal: Patient's chronic conditions and co-morbidity symptoms are monitored and maintained or improved  Outcome: Progressing     Problem: Safety - Adult  Goal: Free from fall injury  Outcome: Progressing Private car

## 2023-05-18 ENCOUNTER — APPOINTMENT (OUTPATIENT)
Dept: UROLOGY | Facility: CLINIC | Age: 43
End: 2023-05-18

## 2023-09-18 NOTE — ED ADULT TRIAGE NOTE - MODE OF ARRIVAL
[facility] [address] [place] [address] S. [address] [facility], [address] [place] [address]  Phone: [phone]    DISCHARGE INSTRUCTIONS:    1.  ___ Complete Exam - Normal    2.  ___ Exam normal, but entire colon not seen. We will discuss this with you. 3.  ___ Polyp(s) removed by "burning" - NO pathology report will follow    4.  _1__ Polyp(s) removed by excision. Pathology report will be available in 4-5 days   Someone from our office will call you with results. 5.  ___ Exam prompted biopsies. Pathology report will be available in 4-5 days   Someone from our office will call you with results. 6.  ___ Exam demonstrated findings that need treatment. Prescriptions will be   Given to you. Return to our office in ____ weeks. Please call for appt. 7.  ___ Original office visit or colonoscopy findings necessitate an office visit. Please call to set up a new appointment    8.  ___ Medication  __________________________________________        [address] [facility] [place]:    - Go straight home and rest today    - No driving or drinking alcohol for 24 hours    - Resume regular diet and medications unless otherwise instructed. Coumadin and Plavix are blood thinners. You can resume these medications on __________.     IF YOU ARE HAVING ANY FEVER, BLEEDING OR PERSISTENT PAIN IN THE ABDOMEN, PLEASE CALL OUR OFFICE ANY DAY OR TIME  [phone]
Walk in

## 2024-04-24 ENCOUNTER — EMERGENCY (EMERGENCY)
Facility: HOSPITAL | Age: 44
LOS: 1 days | Discharge: HOME | End: 2024-04-24
Attending: EMERGENCY MEDICINE
Payer: COMMERCIAL

## 2024-04-24 VITALS
TEMPERATURE: 98 F | HEART RATE: 70 BPM | OXYGEN SATURATION: 99 % | HEIGHT: 61.81 IN | DIASTOLIC BLOOD PRESSURE: 74 MMHG | WEIGHT: 175.93 LBS | SYSTOLIC BLOOD PRESSURE: 109 MMHG | RESPIRATION RATE: 14 BRPM

## 2024-04-24 PROCEDURE — 99284 EMERGENCY DEPT VISIT MOD MDM: CPT

## 2024-04-25 VITALS
DIASTOLIC BLOOD PRESSURE: 70 MMHG | HEART RATE: 67 BPM | TEMPERATURE: 97 F | SYSTOLIC BLOOD PRESSURE: 105 MMHG | RESPIRATION RATE: 16 BRPM | OXYGEN SATURATION: 99 %

## 2024-04-25 PROCEDURE — 99283 EMERGENCY DEPT VISIT LOW MDM: CPT

## 2024-04-25 RX ORDER — DEXAMETHASONE 0.5 MG/5ML
16 ELIXIR ORAL ONCE
Refills: 0 | Status: COMPLETED | OUTPATIENT
Start: 2024-04-25 | End: 2024-04-25

## 2024-04-25 RX ADMIN — Medication 16 MILLIGRAM(S): at 00:31

## 2024-04-25 NOTE — ED PROVIDER NOTE - CLINICAL SUMMARY MEDICAL DECISION MAKING FREE TEXT BOX
44-year-old female with history of asthma never intubated well-controlled presents to the ED complaining of asthma symptoms x 1 day.  Feels it was change in weather recently came back from [place].  No fever, no nausea no vomiting, mild dry cough.  Feels like her asthma.   No leg swelling, no chest pain.    Mild asthma exacerbation.  Improved with DuoNeb's.  1 dose of Decadron 60 mg p.o. given.  Being the fact that no physical exam findings and such rapid improvement no need for steroids for home

## 2024-04-25 NOTE — ED PROVIDER NOTE - RESPIRATORY, MLM
Breath sounds clear and equal bilaterally. dry cough, speaking in full sentences, no accessory muscle use

## 2024-04-25 NOTE — ED ADULT NURSE NOTE - NS ED NURSE LEVEL OF CONSCIOUSNESS AFFECT
Medication Appeal Initiation    We have initiated an appeal for the requested medication:  Medication: mirabegron (MYRBETRIQ) 25 MG 24 hr tablet - APPEAL INITIATED  Appeal Start Date:  3/3/2020  Insurance Company: [employer] [place] - Phone [phone] Fax [phone]  Comments:       URGENT appeal requested through patient's [employer] [place] plan along with letter of medical necessity. Faxed to plan at [phone].    [place] DUMONT Team       Calm

## 2024-04-25 NOTE — ED PROVIDER NOTE - NSFOLLOWUPINSTRUCTIONS_ED_ALL_ED_FT
please monitor your asthma.  If worsening symptoms return to the emergency room.  However can follow-up routinely with primary care.

## 2024-04-25 NOTE — ED PROVIDER NOTE - OBJECTIVE STATEMENT
44-year-old female with history of asthma never intubated well-controlled presents to the ED complaining of asthma symptoms x 1 day.  Feels it was change in weather recently came back from [place].  No fever, no nausea no vomiting, mild dry cough.  Feels like her asthma.   No leg swelling, no chest pain.

## 2024-04-25 NOTE — ED PROVIDER NOTE - PATIENT PORTAL LINK FT
You can access the FollowMyHealth Patient Portal offered by [facility] by registering at the following website: [email or web address]. By joining [employer]’s FollowMyHealth portal, you will also be able to view your health information using other applications (apps) compatible with our system.

## 2024-06-28 ENCOUNTER — EMERGENCY (EMERGENCY)
Facility: HOSPITAL | Age: 44
LOS: 1 days | Discharge: HOME | End: 2024-06-28
Attending: STUDENT IN AN ORGANIZED HEALTH CARE EDUCATION/TRAINING PROGRAM
Payer: COMMERCIAL

## 2024-06-28 VITALS
TEMPERATURE: 98 F | DIASTOLIC BLOOD PRESSURE: 83 MMHG | OXYGEN SATURATION: 99 % | SYSTOLIC BLOOD PRESSURE: 131 MMHG | RESPIRATION RATE: 18 BRPM | WEIGHT: 158.07 LBS | HEART RATE: 67 BPM

## 2024-06-28 VITALS
DIASTOLIC BLOOD PRESSURE: 70 MMHG | HEART RATE: 60 BPM | TEMPERATURE: 97 F | SYSTOLIC BLOOD PRESSURE: 105 MMHG | RESPIRATION RATE: 18 BRPM | OXYGEN SATURATION: 98 %

## 2024-06-28 LAB
ALBUMIN SERPL ELPH-MCNC: 4.2 G/DL — SIGNIFICANT CHANGE UP (ref 3.5–5)
ALP SERPL-CCNC: 77 U/L — SIGNIFICANT CHANGE UP (ref 40–120)
ALT FLD-CCNC: 26 U/L DA — SIGNIFICANT CHANGE UP (ref 10–60)
ANION GAP SERPL CALC-SCNC: 5 MMOL/L — SIGNIFICANT CHANGE UP (ref 5–17)
APTT BLD: 33.2 SEC — SIGNIFICANT CHANGE UP (ref 24.5–35.6)
AST SERPL-CCNC: 16 U/L — SIGNIFICANT CHANGE UP (ref 10–40)
BASOPHILS # BLD AUTO: 0.07 K/UL — SIGNIFICANT CHANGE UP (ref 0–0.2)
BASOPHILS NFR BLD AUTO: 1.1 % — SIGNIFICANT CHANGE UP (ref 0–2)
BILIRUB SERPL-MCNC: 0.9 MG/DL — SIGNIFICANT CHANGE UP (ref 0.2–1.2)
BUN SERPL-MCNC: 11 MG/DL — SIGNIFICANT CHANGE UP (ref 7–18)
CALCIUM SERPL-MCNC: 8.9 MG/DL — SIGNIFICANT CHANGE UP (ref 8.4–10.5)
CHLORIDE SERPL-SCNC: 106 MMOL/L — SIGNIFICANT CHANGE UP (ref 96–108)
CO2 SERPL-SCNC: 25 MMOL/L — SIGNIFICANT CHANGE UP (ref 22–31)
CREAT SERPL-MCNC: 0.63 MG/DL — SIGNIFICANT CHANGE UP (ref 0.5–1.3)
EGFR: 112 ML/MIN/1.73M2 — SIGNIFICANT CHANGE UP
EOSINOPHIL # BLD AUTO: 0.23 K/UL — SIGNIFICANT CHANGE UP (ref 0–0.5)
EOSINOPHIL NFR BLD AUTO: 3.6 % — SIGNIFICANT CHANGE UP (ref 0–6)
GLUCOSE SERPL-MCNC: 97 MG/DL — SIGNIFICANT CHANGE UP (ref 70–99)
HCG SERPL-ACNC: 1 MIU/ML — SIGNIFICANT CHANGE UP
HCT VFR BLD CALC: 42.1 % — SIGNIFICANT CHANGE UP (ref 34.5–45)
HGB BLD-MCNC: 14.2 G/DL — SIGNIFICANT CHANGE UP (ref 11.5–15.5)
HIV 1 & 2 AB SERPL IA.RAPID: SIGNIFICANT CHANGE UP
IMM GRANULOCYTES NFR BLD AUTO: 0.3 % — SIGNIFICANT CHANGE UP (ref 0–0.9)
INR BLD: 1.06 RATIO — SIGNIFICANT CHANGE UP (ref 0.85–1.18)
LYMPHOCYTES # BLD AUTO: 1.96 K/UL — SIGNIFICANT CHANGE UP (ref 1–3.3)
LYMPHOCYTES # BLD AUTO: 30.4 % — SIGNIFICANT CHANGE UP (ref 13–44)
MCHC RBC-ENTMCNC: 31.4 PG — SIGNIFICANT CHANGE UP (ref 27–34)
MCHC RBC-ENTMCNC: 33.7 GM/DL — SIGNIFICANT CHANGE UP (ref 32–36)
MCV RBC AUTO: 93.1 FL — SIGNIFICANT CHANGE UP (ref 80–100)
MONOCYTES # BLD AUTO: 0.45 K/UL — SIGNIFICANT CHANGE UP (ref 0–0.9)
MONOCYTES NFR BLD AUTO: 7 % — SIGNIFICANT CHANGE UP (ref 2–14)
NEUTROPHILS # BLD AUTO: 3.71 K/UL — SIGNIFICANT CHANGE UP (ref 1.8–7.4)
NEUTROPHILS NFR BLD AUTO: 57.6 % — SIGNIFICANT CHANGE UP (ref 43–77)
NRBC # BLD: 0 /100 WBCS — SIGNIFICANT CHANGE UP (ref 0–0)
PLATELET # BLD AUTO: 338 K/UL — SIGNIFICANT CHANGE UP (ref 150–400)
POTASSIUM SERPL-MCNC: 4.2 MMOL/L — SIGNIFICANT CHANGE UP (ref 3.5–5.3)
POTASSIUM SERPL-SCNC: 4.2 MMOL/L — SIGNIFICANT CHANGE UP (ref 3.5–5.3)
PROT SERPL-MCNC: 7.8 G/DL — SIGNIFICANT CHANGE UP (ref 6–8.3)
PROTHROM AB SERPL-ACNC: 12.1 SEC — SIGNIFICANT CHANGE UP (ref 9.5–13)
RBC # BLD: 4.52 M/UL — SIGNIFICANT CHANGE UP (ref 3.8–5.2)
RBC # FLD: 13.2 % — SIGNIFICANT CHANGE UP (ref 10.3–14.5)
SODIUM SERPL-SCNC: 136 MMOL/L — SIGNIFICANT CHANGE UP (ref 135–145)
TROPONIN I, HIGH SENSITIVITY RESULT: <3 NG/L — SIGNIFICANT CHANGE UP
WBC # BLD: 6.44 K/UL — SIGNIFICANT CHANGE UP (ref 3.8–10.5)
WBC # FLD AUTO: 6.44 K/UL — SIGNIFICANT CHANGE UP (ref 3.8–10.5)

## 2024-06-28 PROCEDURE — 82962 GLUCOSE BLOOD TEST: CPT

## 2024-06-28 PROCEDURE — 85610 PROTHROMBIN TIME: CPT

## 2024-06-28 PROCEDURE — 70498 CT ANGIOGRAPHY NECK: CPT | Mod: 26,MC

## 2024-06-28 PROCEDURE — 70498 CT ANGIOGRAPHY NECK: CPT | Mod: MC

## 2024-06-28 PROCEDURE — 96374 THER/PROPH/DIAG INJ IV PUSH: CPT | Mod: XU

## 2024-06-28 PROCEDURE — 80053 COMPREHEN METABOLIC PANEL: CPT

## 2024-06-28 PROCEDURE — 99285 EMERGENCY DEPT VISIT HI MDM: CPT

## 2024-06-28 PROCEDURE — 99285 EMERGENCY DEPT VISIT HI MDM: CPT | Mod: 25

## 2024-06-28 PROCEDURE — 93005 ELECTROCARDIOGRAM TRACING: CPT

## 2024-06-28 PROCEDURE — 86803 HEPATITIS C AB TEST: CPT

## 2024-06-28 PROCEDURE — 84484 ASSAY OF TROPONIN QUANT: CPT

## 2024-06-28 PROCEDURE — 85025 COMPLETE CBC W/AUTO DIFF WBC: CPT

## 2024-06-28 PROCEDURE — 84702 CHORIONIC GONADOTROPIN TEST: CPT

## 2024-06-28 PROCEDURE — 86703 HIV-1/HIV-2 1 RESULT ANTBDY: CPT

## 2024-06-28 PROCEDURE — 70496 CT ANGIOGRAPHY HEAD: CPT | Mod: MC

## 2024-06-28 PROCEDURE — 85730 THROMBOPLASTIN TIME PARTIAL: CPT

## 2024-06-28 PROCEDURE — 70496 CT ANGIOGRAPHY HEAD: CPT | Mod: 26,MC

## 2024-06-28 PROCEDURE — 36415 COLL VENOUS BLD VENIPUNCTURE: CPT

## 2024-06-28 RX ORDER — METOCLOPRAMIDE 5 MG/5ML
10 SOLUTION ORAL ONCE
Refills: 0 | Status: COMPLETED | OUTPATIENT
Start: 2024-06-28 | End: 2024-06-28

## 2024-06-28 RX ORDER — MECLIZINE HCL 25 MG
1 TABLET ORAL
Qty: 20 | Refills: 0
Start: 2024-06-28

## 2024-06-28 RX ORDER — MECLIZINE HCL 25 MG
25 TABLET ORAL ONCE
Refills: 0 | Status: COMPLETED | OUTPATIENT
Start: 2024-06-28 | End: 2024-06-28

## 2024-06-28 RX ORDER — DIPHENHYDRAMINE HCL 12.5MG/5ML
25 ELIXIR ORAL ONCE
Refills: 0 | Status: COMPLETED | OUTPATIENT
Start: 2024-06-28 | End: 2024-06-28

## 2024-06-28 RX ADMIN — Medication 25 MILLIGRAM(S): at 14:15

## 2024-06-28 RX ADMIN — Medication 25 MILLIGRAM(S): at 14:16

## 2024-06-29 LAB
HCV AB S/CO SERPL IA: 0.14 S/CO — SIGNIFICANT CHANGE UP (ref 0–0.99)
HCV AB SERPL-IMP: SIGNIFICANT CHANGE UP

## 2024-07-03 ENCOUNTER — APPOINTMENT (OUTPATIENT)
Dept: NEUROSURGERY | Facility: CLINIC | Age: 44
End: 2024-07-03

## 2024-07-08 ENCOUNTER — NON-APPOINTMENT (OUTPATIENT)
Age: 44
End: 2024-07-08

## 2024-07-10 ENCOUNTER — INPATIENT (INPATIENT)
Facility: HOSPITAL | Age: 44
LOS: 1 days | Discharge: HOME | DRG: 149 | End: 2024-07-12
Attending: STUDENT IN AN ORGANIZED HEALTH CARE EDUCATION/TRAINING PROGRAM | Admitting: STUDENT IN AN ORGANIZED HEALTH CARE EDUCATION/TRAINING PROGRAM
Payer: COMMERCIAL

## 2024-07-10 VITALS
SYSTOLIC BLOOD PRESSURE: 140 MMHG | OXYGEN SATURATION: 97 % | WEIGHT: 167.77 LBS | TEMPERATURE: 98 F | HEART RATE: 104 BPM | RESPIRATION RATE: 18 BRPM | DIASTOLIC BLOOD PRESSURE: 90 MMHG | HEIGHT: 61 IN

## 2024-07-10 DIAGNOSIS — R42 DIZZINESS AND GIDDINESS: ICD-10-CM

## 2024-07-10 LAB
ALBUMIN SERPL ELPH-MCNC: 4.1 G/DL — SIGNIFICANT CHANGE UP (ref 3.5–5)
ALP SERPL-CCNC: 78 U/L — SIGNIFICANT CHANGE UP (ref 40–120)
ALT FLD-CCNC: 25 U/L DA — SIGNIFICANT CHANGE UP (ref 10–60)
ANION GAP SERPL CALC-SCNC: 7 MMOL/L — SIGNIFICANT CHANGE UP (ref 5–17)
APTT BLD: 36.9 SEC — HIGH (ref 24.5–35.6)
AST SERPL-CCNC: 15 U/L — SIGNIFICANT CHANGE UP (ref 10–40)
BASOPHILS # BLD AUTO: 0.07 K/UL — SIGNIFICANT CHANGE UP (ref 0–0.2)
BASOPHILS NFR BLD AUTO: 0.9 % — SIGNIFICANT CHANGE UP (ref 0–2)
BILIRUB SERPL-MCNC: 0.9 MG/DL — SIGNIFICANT CHANGE UP (ref 0.2–1.2)
BUN SERPL-MCNC: 12 MG/DL — SIGNIFICANT CHANGE UP (ref 7–18)
CALCIUM SERPL-MCNC: 9.1 MG/DL — SIGNIFICANT CHANGE UP (ref 8.4–10.5)
CHLORIDE SERPL-SCNC: 106 MMOL/L — SIGNIFICANT CHANGE UP (ref 96–108)
CO2 SERPL-SCNC: 25 MMOL/L — SIGNIFICANT CHANGE UP (ref 22–31)
CREAT SERPL-MCNC: 0.74 MG/DL — SIGNIFICANT CHANGE UP (ref 0.5–1.3)
EGFR: 102 ML/MIN/1.73M2 — SIGNIFICANT CHANGE UP
EOSINOPHIL # BLD AUTO: 0.22 K/UL — SIGNIFICANT CHANGE UP (ref 0–0.5)
EOSINOPHIL NFR BLD AUTO: 2.9 % — SIGNIFICANT CHANGE UP (ref 0–6)
GLUCOSE SERPL-MCNC: 106 MG/DL — HIGH (ref 70–99)
HCT VFR BLD CALC: 41.8 % — SIGNIFICANT CHANGE UP (ref 34.5–45)
HGB BLD-MCNC: 14 G/DL — SIGNIFICANT CHANGE UP (ref 11.5–15.5)
IMM GRANULOCYTES NFR BLD AUTO: 0.3 % — SIGNIFICANT CHANGE UP (ref 0–0.9)
INR BLD: 1.03 RATIO — SIGNIFICANT CHANGE UP (ref 0.85–1.18)
LYMPHOCYTES # BLD AUTO: 2.11 K/UL — SIGNIFICANT CHANGE UP (ref 1–3.3)
LYMPHOCYTES # BLD AUTO: 27.6 % — SIGNIFICANT CHANGE UP (ref 13–44)
MCHC RBC-ENTMCNC: 30.6 PG — SIGNIFICANT CHANGE UP (ref 27–34)
MCHC RBC-ENTMCNC: 33.5 GM/DL — SIGNIFICANT CHANGE UP (ref 32–36)
MCV RBC AUTO: 91.5 FL — SIGNIFICANT CHANGE UP (ref 80–100)
MONOCYTES # BLD AUTO: 0.53 K/UL — SIGNIFICANT CHANGE UP (ref 0–0.9)
MONOCYTES NFR BLD AUTO: 6.9 % — SIGNIFICANT CHANGE UP (ref 2–14)
NEUTROPHILS # BLD AUTO: 4.7 K/UL — SIGNIFICANT CHANGE UP (ref 1.8–7.4)
NEUTROPHILS NFR BLD AUTO: 61.4 % — SIGNIFICANT CHANGE UP (ref 43–77)
NRBC # BLD: 0 /100 WBCS — SIGNIFICANT CHANGE UP (ref 0–0)
PLATELET # BLD AUTO: 328 K/UL — SIGNIFICANT CHANGE UP (ref 150–400)
POTASSIUM SERPL-MCNC: 3.9 MMOL/L — SIGNIFICANT CHANGE UP (ref 3.5–5.3)
POTASSIUM SERPL-SCNC: 3.9 MMOL/L — SIGNIFICANT CHANGE UP (ref 3.5–5.3)
PROT SERPL-MCNC: 8 G/DL — SIGNIFICANT CHANGE UP (ref 6–8.3)
PROTHROM AB SERPL-ACNC: 11.7 SEC — SIGNIFICANT CHANGE UP (ref 9.5–13)
RBC # BLD: 4.57 M/UL — SIGNIFICANT CHANGE UP (ref 3.8–5.2)
RBC # FLD: 13.2 % — SIGNIFICANT CHANGE UP (ref 10.3–14.5)
SODIUM SERPL-SCNC: 138 MMOL/L — SIGNIFICANT CHANGE UP (ref 135–145)
TROPONIN I, HIGH SENSITIVITY RESULT: <3 NG/L — SIGNIFICANT CHANGE UP
WBC # BLD: 7.65 K/UL — SIGNIFICANT CHANGE UP (ref 3.8–10.5)
WBC # FLD AUTO: 7.65 K/UL — SIGNIFICANT CHANGE UP (ref 3.8–10.5)

## 2024-07-10 PROCEDURE — 99285 EMERGENCY DEPT VISIT HI MDM: CPT

## 2024-07-10 PROCEDURE — 0042T: CPT | Mod: MC

## 2024-07-10 PROCEDURE — 70496 CT ANGIOGRAPHY HEAD: CPT | Mod: 26,MC

## 2024-07-10 PROCEDURE — 99255 IP/OBS CONSLTJ NEW/EST HI 80: CPT

## 2024-07-10 PROCEDURE — 99223 1ST HOSP IP/OBS HIGH 75: CPT | Mod: GC

## 2024-07-10 PROCEDURE — 70498 CT ANGIOGRAPHY NECK: CPT | Mod: 26,MC

## 2024-07-10 RX ORDER — ACETAMINOPHEN 325 MG
1000 TABLET ORAL ONCE
Refills: 0 | Status: COMPLETED | OUTPATIENT
Start: 2024-07-10 | End: 2024-07-10

## 2024-07-10 RX ADMIN — Medication 400 MILLIGRAM(S): at 23:28

## 2024-07-10 NOTE — H&P ADULT - ATTENDING COMMENTS
This is a late entry, patient was evaluated in ED on 07/10/24 at 6pm     Ms. Snell is a 43 y/o female from home w/ PMH of asthma- on albuterol prn and recent diagnosis of small supraclinoid aneurysm presents to ED w/ c/o right sided weakness, numbness and tingling sensation since 9am this morning. She reports that symptoms have improved but still has ongoing numbness and tingling. She reports This is a late entry, patient was evaluated in ED on 07/10/24 at 6pm     Ms. Snell is a 43 y/o female from home w/ PMH of asthma- on albuterol prn, migraines and recent diagnosis of small supraclinoid aneurysm presents to ED w/ c/o right sided weakness, numbness and tingling sensation since 9am this morning. She reports that symptoms have improved but still has ongoing numbness and tingling. She reports that she first started having these symptoms approx 2 weeks ago, it started w/ headache followed by weakness which resolved but has intermittent numbness and tingling on Rt side since then. She also reports nausea and dizziness but  denies any ongoing headaches, blurry vision or other symptoms.     In ED, patient's VS were stable. Cide stroke was activated- she underwent CT head and CTA head and neck which showed- known  small supraclinoid aneurysm     Labs reviewed- cbc, bmp, LFTs, CE     PE as above   Rt sided motor weakness 4/5     A/P:  #Rt sided numbness/weakness- suspect TIA vs demyelinating disorder vs complex migraines   #Asthma- not in exacerbation   #small supraclinoid aneurysm  #DVT ppx     Plan:  -D/w neurologist Dr. Vicente, will get MR head w/ and without contrast, EEG, vitamin B12, folate, MMA  -C/w tele, further w/u pending MR results   -c/w albuterol prn   -Patient has an appointment w/ neuro-surgeon Dr. Reza Espinosa

## 2024-07-10 NOTE — ED ADULT TRIAGE NOTE - BSA (M2)
Asymmetric rest tremor, bradykinesia, rigidity, postural gait changes that are responsive to dopaminergic supplementation consistent with his diagnosis of Parkinson's disease  He is noting wearing off of the medications about 30 minutes prior to his next dose with return of tremor  Time spent discussing Parkinson's disease, its natural progression, development of relations  We reviewed potential treatment options in reducing off time being adding medications such as Nourianz, a COMT inhibitor such as opicapone or entacapone, or dosing Sinemet closer together  We also discussed the potential of switching the formulation of Sinemet to Rytary to reduce off time  At this point would avoid dopamine agonist with this patient given a history of lymphedema central side effect such as weight gain, impulsivity, daytime sedation, and hallucinations  He has sleep apnea for which he uses CPAP  Time also briefly spent on discussing deep brain stimulation surgery and its role in the treatment of Parkinson's disease  He is not interested at this time  Wishes to try switching his Sinemet to Rytary switch made with small increase in levodopa    Instructed to take take Rytary 195mg 2 caps 4 times daily  Continue amantadine 100mg with first and third dose of Rytary 1.75

## 2024-07-10 NOTE — ED PROVIDER NOTE - PATIENT LAST KNOWN
Known Patient requests all Lab, Cardiology, and Radiology Results on their Discharge Instructions Statement Selected

## 2024-07-10 NOTE — H&P ADULT - NSHPREVIEWOFSYSTEMS_GEN_ALL_CORE
CONSTITUTIONAL:  No fevers or chills, good appetite, good general state; intermittent hot flashes   EYES/ENT:  +dizziness/vertigo, +intermittent blurred vision during episodes    NECK:  No neck pain or stiffness  RESPIRATORY:  No cough, wheezing, hemoptysis; No shortness of breath  CARDIOVASCULAR:  +intermittent palpitations worse at night; +intermittent chest pressure   GASTROINTESTINAL:  +nausea without vomiting; +2 episodes diarrhea morning of admission   GENITOURINARY:  No dysuria, frequency or hematuria  NEUROLOGICAL:  +frontal HA; +R sided arm and leg weakness  MSK: no back pain, no joint pain  SKIN:  No itching, no skin rash

## 2024-07-10 NOTE — ED PROVIDER NOTE - PHYSICAL EXAMINATION
General: Patient well appearing, vital signs within normal limits  HEENT: MMM, trachea midline  Respiratory: No respiratory distress  Neuro:  NIH stroke scale of 2, subtle drift in the right upper and lower extremity  Skin: No rashes or lesions, warm, dry  Psych: Alert and oriented x 3

## 2024-07-10 NOTE — H&P ADULT - ASSESSMENT
44-year-old woman coming with dizziness and right-sided weakness admitted for TIA workup.  Also has symptoms concerning for thyroid disease.

## 2024-07-10 NOTE — H&P ADULT - PROBLEM SELECTOR PLAN 2
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

## 2024-07-10 NOTE — H&P ADULT - PROBLEM SELECTOR PLAN 3
Reports about 2 weeks of palpitations that are worse at night  Intermittent chest pressure  Hot flashes since age 38  Nausea and dizziness  Headache   Admit to telemetry for monitoring    Follow-up TSH

## 2024-07-10 NOTE — ED ADULT NURSE NOTE - OBJECTIVE STATEMENT
Pt presents to the ED c/o right sided weakness and tingling, nausea and dizziness that started around 0900 today. LKW about 0830 today as per Pt. Code stroke activated. MD Dumont made aware and present at bedside.

## 2024-07-10 NOTE — CONSULT NOTE ADULT - SUBJECTIVE AND OBJECTIVE BOX
NEUROLOGY CONSULT NOTE    NAME:  ROMY ROSE      ASSESSMENT:  44 RHF presenting with dizziness and right-sided hemiparesis, resolved concerning for transient ischemic attack vs. 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      RECOMMENDATIONS:    - Admit to Telemetry unit: Q4H VS & Neurochecks    - MRI Brain w/wo Gadolinium approved to evaluate for intracranial abnormalities, including demyelinating disease    - Routine EEG approved to evaluate for seizure tendency    - Check Vitamin B12, Folate, and TSH levels, and treat if abnormal    - Metabolic and infectious workup and treatment as per primary team    - PT/OT    - DVT ppx: SCDs, Enoxaparin or Heparin          NOTE TO BE COMPLETED - PLEASE REFER TO ABOVE ONLY AND IGNORE INFORMATION BELOW    *******************************      CHIEF COMPLAINT:  Patient is a 44y old  Female who presents with a chief complaint of     HPI:      NEURO HPI:      PAST MEDICAL & SURGICAL HISTORY:  Asthma  Dust allergy  Gastritis  No significant past surgical history      MEDICATIONS:      ALLERGIES:  No Known Allergies      FAMILY HISTORY:  Family history of prostate cancer (Father)      SOCIAL HISTORY:  Denies alcohol, tobacco, or illicit drug use      REVIEW OF SYSTEMS:  GENERAL: No fever, weight changes, fatigue  EYES: No eye pain or discharge  EAR/NOSE/MOUTH/THROAT: No sinus or throat pain; No difficulty hearing  NECK: No pain or stiffness  RESPIRATORY: No cough, wheezing, chills, or hemoptysis  CARDIOVASCULAR: No chest pain, palpitations, shortness of breath, or dyspnea on exertion  GASTROINTESTINAL: No abdominal pain, nausea, vomiting, hematemesis, diarrhea, or constipation  GENITOURINARY: No dysuria, frequency, hematuria, or incontinence  SKIN: No rashes or lesions  ENDOCRINE: No heat or cold intolerance  HEMATOLOGIC: No easy bruising or bleeding  PSYCHIATRIC: No depression, anxiety, or mood swings  MUSCULOSKELETAL: No joint pain or swelling  NEUROLOGICAL: As per HPI          OBJECTIVE:    Vital Signs Last 24 Hrs  T(C): 37 (10 Jul 2024 20:44), Max: 37 (10 Jul 2024 20:44)  T(F): 98.6 (10 Jul 2024 20:44), Max: 98.6 (10 Jul 2024 20:44)  HR: 77 (10 Jul 2024 20:44) (77 - 104)  BP: 120/85 (10 Jul 2024 20:44) (120/85 - 140/90)  RR: 20 (10 Jul 2024 20:44) (18 - 20)  SpO2: 100% (10 Jul 2024 20:44) (97% - 100%)  Parameters below as of 10 Jul 2024 20:44  Patient On (Oxygen Delivery Method): room air      General Examination:  General: No acute distress  HEENT: Atraumatic, Normocephalic  Respiratory: Rapid rate, Diminished breath sounds b/l  Cardiovascular: RRR.  Normal S1 & S2.  Normal b/l radial and pedal pulses.    Neurological Examination:  General / Mental Status: AAO x 3.  No aphasia or dysarthria.  Naming and repetition intact.  Cranial Nerves: VFF x 4.  PERRL.  EOMI x 2, No nystagmus or diplopia.  B/l V1-V3 equal and intact to light touch and pinprick.  Symmetric facial movement and palate elevation.  B/l hearing equal to finger rub.  5/5 strength with b/l sternocleidomastoid and trapezius.  Midline tongue protrusion, with no atrophy or fasciculations.  Motor: Normal bulk & tone in all four extremities.  5/5 strength throughout all four extremities.  No downward drift, rigidity, spasticity, or tremors in any of the four extremities.  Sensory: Intact to light touch and pinprick in all four extremities.  Negative Romberg.  Reflex: 2+ and symmetric at b/l biceps, triceps, brachioradialis, patellae, and ankles.  Downgoing toes b/l.  Coordination: No dysmetria with b/l finger-to-nose and heel raise tests.  Symmetric rapid alternating movements b/l.  Gait: Normal, narrow-based gait.  No difficulty with tiptoe, heel, and tandem gaits.        LABORATORY VALUES:                        14.0   7.65  )-----------( 328      ( 10 Jul 2024 13:55 )             41.8       07-10    138  |  106  |  12  ----------------------------<  106<H>  3.9   |  25  |  0.74    Ca    9.1      10 Jul 2024 13:55    TPro  8.0  /  Alb  4.1  /  TBili  0.9  /  DBili  x   /  AST  15  /  ALT  25  /  AlkPhos  78  07-10          NEUROIMAGING:          Please contact the Neurology consult service with any neurological questions.    Rob Vicente MD   of Neurology  [facility] at [facility] NEUROLOGY CONSULT NOTE    NAME:  ROMY ROSE      ASSESSMENT:  44 RHF presenting with dizziness and right-sided hemiparesis, resolved concerning for transient ischemic attack vs. 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      RECOMMENDATIONS:    - Admit to Telemetry unit: Q4H VS & Neurochecks    - MRI Brain w/wo Gadolinium approved to evaluate for intracranial abnormalities, including demyelinating disease    - Routine EEG approved to evaluate for seizure tendency    - Check Vitamin B12, Folate, and TSH levels, and treat if abnormal    - Metabolic and infectious workup and treatment as per primary team    - PT/OT    - DVT ppx: SCDs, Enoxaparin or Heparin          *******************************      CHIEF COMPLAINT:  Patient is a 44y old  Female who presents with a chief complaint of     HPI:  44-year-old woman with no medical history except a recent diagnosis of a 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  At the time she was unable to lift her right leg. Symptoms are associated with dizziness, nausea, and blurred vision.  She was in the emergency room about 2 weeks ago for similar dizziness and was discharged with meclizine as needed.  She endorses about 2 weeks of intermittent palpitations mostly at night with intermittent chest pressure.  She says she has had hot flashes since she was 38 years old.    She had 2 episodes of diarrhea on the morning of admission. She has right leg swelling that comes and goes.  She also says she has been sleeping more than usual recently.  On exam she does complain of a frontal headache with right-sided cheek pain that is not influenced by chewing.  She denies any photophobia.  She has a history of migraines most recently 5 years ago and does not usually get headaches. CT head confirmed a 4.5mm supraclinoid aneurysm on the left projecting to the superior anterior. This is unlikely the cause of her symptoms.  On exam she does have mild right sided upper extremity and lower extremity weakness.  She does not have disturbances to sensation. She will be admitted to telemetry for TIA workup.  Also some concern for thyroid condition.      NEURO HPI:  44 RHF presenting with weakness and tingling affecting her right arm and right leg, as well as blurry vision, dizziness, and nausea. Symptoms have been improving while the patient has been in the Emergency Department.      PAST MEDICAL & SURGICAL HISTORY:  Asthma  Dust allergy  Gastritis  No significant past surgical history      MEDICATIONS:  · 	meclizine 25 mg oral tablet: 1 tab(s) orally every 8 hours as needed for  dizziness  · 	albuterol 2.5 mg/3 mL (0.083%) inhalation solution: 3 milliliter(s) inhaled 4 times a day  · 	Advair Diskus 250 mcg-50 mcg inhalation powder: 1 puff(s) inhaled 2 times a day   · 	ProAir HFA 90 mcg/inh inhalation aerosol: 2 puff(s) inhaled every 4 hours, As Needed -for shortness of breath and/or wheezing   · 	predniSONE 20 mg oral tablet: 3 tab(s) orally once a day  · 	amoxicillin 500 mg oral tablet: 1 tab(s) orally 2 times a day  · 	acetaminophen 325 mg oral tablet: 2 tab(s) orally every 4 hours, As Needed - for fever  · 	Ceftin 250 mg oral tablet: 1 tab(s) orally every 12 hours  · 	predniSONE 20 mg oral tablet: 2 tab(s) orally once a day -for allergy symptoms  · 	predniSONE 20 mg oral tablet: 2 tab(s) orally once a day -for asthma  · 	predniSONE 20 mg oral tablet: 3 tabs orally once a day for 1 days then  	2 tabs orally once a day for 4 days  	Take after eating food  · 	albuterol 2.5 mg/3 mL (0.083%) inhalation solution: 3 milliliter(s) inhaled every 6 hours, As Needed for cough or wheezing  · 	predniSONE 20 mg oral tablet: 2 tab(s) orally once a day x 5days  · 	albuterol CFC free 90 mcg/inh inhalation aerosol: 1 puff(s) inhaled every 4 hours- for bronchospasm   · 	ProAir HFA:  inhaled   · 	omeprazole 10 mg oral delayed release capsule: 1 cap(s) orally once a day  · 	Levaquin 500 mg oral tablet: 1 tab(s) orally every 24 hours x 5 days      ALLERGIES:  No Known Allergies      FAMILY HISTORY:  Family history of prostate cancer (Father)      SOCIAL HISTORY:  Denies alcohol, tobacco, or illicit drug use      REVIEW OF SYSTEMS:  GENERAL: No fever, weight changes, fatigue  EYES: No eye pain or discharge  EAR/NOSE/MOUTH/THROAT: No sinus or throat pain; No difficulty hearing  NECK: No pain or stiffness  RESPIRATORY: No cough, wheezing, chills, or hemoptysis  CARDIOVASCULAR: No chest pain, palpitations, shortness of breath, or dyspnea on exertion  GASTROINTESTINAL: No abdominal pain, nausea, vomiting, hematemesis, diarrhea, or constipation  GENITOURINARY: No dysuria, frequency, hematuria, or incontinence  SKIN: No rashes or lesions  ENDOCRINE: No heat or cold intolerance  HEMATOLOGIC: No easy bruising or bleeding  PSYCHIATRIC: Recent increased tiredness; No depression, anxiety, or mood swings  MUSCULOSKELETAL: No joint pain or swelling  NEUROLOGICAL: As per HPI          OBJECTIVE:    Vital Signs Last 24 Hrs  T(C): 37 (10 Jul 2024 20:44), Max: 37 (10 Jul 2024 20:44)  T(F): 98.6 (10 Jul 2024 20:44), Max: 98.6 (10 Jul 2024 20:44)  HR: 77 (10 Jul 2024 20:44) (77 - 104)  BP: 120/85 (10 Jul 2024 20:44) (120/85 - 140/90)  RR: 20 (10 Jul 2024 20:44) (18 - 20)  SpO2: 100% (10 Jul 2024 20:44) (97% - 100%)  Parameters below as of 10 Jul 2024 20:44  Patient On (Oxygen Delivery Method): room air      General Examination:  General: No acute distress  HEENT: Atraumatic, Normocephalic  Respiratory: Rapid rate, Diminished breath sounds b/l  Cardiovascular: RRR.  Normal S1 & S2.  Normal b/l radial and pedal pulses.    Neurological Examination:  General / Mental Status: AAO x 3.  No aphasia or dysarthria.  Naming and repetition intact.  Cranial Nerves: VFF x 4.  PERRL.  EOMI x 2, No nystagmus or diplopia.  B/l V1-V3 equal and intact to light touch and pinprick.  Symmetric facial movement and palate elevation.  B/l hearing equal to finger rub.  5/5 strength with b/l sternocleidomastoid and trapezius.  Midline tongue protrusion, with no atrophy or fasciculations.  Motor: Normal bulk & tone in all four extremities.  At least 4/5 strength throughout right arm and right leg.  5/5 strength throughout left arm and left leg.  No downward drift, rigidity, spasticity, or tremors in any of the four extremities.  Sensory: Intact to light touch and pinprick in all four extremities.  Reflex: 2+ and symmetric at b/l biceps, triceps, brachioradialis, patellae, and ankles.  Downgoing toes b/l.  Coordination: No dysmetria with b/l finger-to-nose and heel raise tests.  Gait and Romberg sign testing deferred per patient preference.          LABORATORY VALUES:                        14.0   7.65  )-----------( 328      ( 10 Jul 2024 13:55 )             41.8       07-10    138  |  106  |  12  ----------------------------<  106<H>  3.9   |  25  |  0.74    Ca    9.1      10 Jul 2024 13:55    TPro  8.0  /  Alb  4.1  /  TBili  0.9  /  DBili  x   /  AST  15  /  ALT  25  /  AlkPhos  78  07-10          NEUROIMAGING:      CT Head & CTA Head/Neck & CT Perfusion Head (7/1/24):  - No acute intracranial structural abnormality or focal hypoperfusion  - Left supraclinoid aneurysm with 4.5 mm diameter  - No large vessel cutoff or hemodynamically significant stenosis          Please contact the Neurology consult service with any neurological questions.    Rob Vicente MD   of Neurology  [facility] at [facility] NEUROLOGY CONSULT NOTE    NAME:  ROMY ROSE      ASSESSMENT:  44 RHF presenting with dizziness and right-sided hemiparesis, resolved concerning for transient ischemic attack vs. 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      RECOMMENDATIONS:    - Admit to Telemetry unit: Q4H VS & Neurochecks    - MRI Brain w/wo Gadolinium approved to evaluate for intracranial abnormalities, including demyelinating disease    - Routine EEG approved to evaluate for seizure tendency    - Check Vitamin B12, Folate, and TSH levels, and treat if abnormal    - Metabolic and infectious workup and treatment as per primary team    - PT/OT    - DVT ppx: SCDs, Enoxaparin or Heparin          *******************************      CHIEF COMPLAINT:  Patient is a 44y old  Female who presents with a chief complaint of     HPI:  44-year-old woman with no medical history except a recent diagnosis of a 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  At the time she was unable to lift her right leg. Symptoms are associated with dizziness, nausea, and blurred vision.  She was in the emergency room about 2 weeks ago for similar dizziness and was discharged with meclizine as needed.  She endorses about 2 weeks of intermittent palpitations mostly at night with intermittent chest pressure.  She says she has had hot flashes since she was 38 years old.    She had 2 episodes of diarrhea on the morning of admission. She has right leg swelling that comes and goes.  She also says she has been sleeping more than usual recently.  On exam she does complain of a frontal headache with right-sided cheek pain that is not influenced by chewing.  She denies any photophobia.  She has a history of migraines most recently 5 years ago and does not usually get headaches. CT head confirmed a 4.5mm supraclinoid aneurysm on the left projecting to the superior anterior. This is unlikely the cause of her symptoms.  On exam she does have mild right sided upper extremity and lower extremity weakness.  She does not have disturbances to sensation. She will be admitted to telemetry for TIA workup.  Also some concern for thyroid condition.      NEURO HPI:  44 RHF presenting with weakness and tingling affecting her right arm and right leg, as well as blurry vision, dizziness, and nausea. Symptoms have been improving while the patient has been in the Emergency Department.      PAST MEDICAL & SURGICAL HISTORY:  Asthma  Dust allergy  Gastritis  No significant past surgical history      MEDICATIONS:  · 	meclizine 25 mg oral tablet: 1 tab(s) orally every 8 hours as needed for  dizziness  · 	albuterol 2.5 mg/3 mL (0.083%) inhalation solution: 3 milliliter(s) inhaled 4 times a day  · 	Advair Diskus 250 mcg-50 mcg inhalation powder: 1 puff(s) inhaled 2 times a day   · 	ProAir HFA 90 mcg/inh inhalation aerosol: 2 puff(s) inhaled every 4 hours, As Needed -for shortness of breath and/or wheezing   · 	predniSONE 20 mg oral tablet: 3 tab(s) orally once a day  · 	amoxicillin 500 mg oral tablet: 1 tab(s) orally 2 times a day  · 	acetaminophen 325 mg oral tablet: 2 tab(s) orally every 4 hours, As Needed - for fever  · 	Ceftin 250 mg oral tablet: 1 tab(s) orally every 12 hours  · 	predniSONE 20 mg oral tablet: 2 tab(s) orally once a day -for allergy symptoms  · 	predniSONE 20 mg oral tablet: 2 tab(s) orally once a day -for asthma  · 	predniSONE 20 mg oral tablet: 3 tabs orally once a day for 1 days then  	2 tabs orally once a day for 4 days  	Take after eating food  · 	albuterol 2.5 mg/3 mL (0.083%) inhalation solution: 3 milliliter(s) inhaled every 6 hours, As Needed for cough or wheezing  · 	predniSONE 20 mg oral tablet: 2 tab(s) orally once a day x 5days  · 	albuterol CFC free 90 mcg/inh inhalation aerosol: 1 puff(s) inhaled every 4 hours- for bronchospasm   · 	ProAir HFA:  inhaled   · 	omeprazole 10 mg oral delayed release capsule: 1 cap(s) orally once a day  · 	Levaquin 500 mg oral tablet: 1 tab(s) orally every 24 hours x 5 days      ALLERGIES:  No Known Allergies      FAMILY HISTORY:  Family history of prostate cancer (Father)      SOCIAL HISTORY:  Denies alcohol, tobacco, or illicit drug use      REVIEW OF SYSTEMS:  GENERAL: No fever, weight changes, fatigue  EYES: No eye pain or discharge  EAR/NOSE/MOUTH/THROAT: No sinus or throat pain; No difficulty hearing  NECK: No pain or stiffness  RESPIRATORY: No cough, wheezing, chills, or hemoptysis  CARDIOVASCULAR: No chest pain, palpitations, shortness of breath, or dyspnea on exertion  GASTROINTESTINAL: No abdominal pain, nausea, vomiting, hematemesis, diarrhea, or constipation  GENITOURINARY: No dysuria, frequency, hematuria, or incontinence  SKIN: No rashes or lesions  ENDOCRINE: No heat or cold intolerance  HEMATOLOGIC: No easy bruising or bleeding  PSYCHIATRIC: Recent increased tiredness; No depression, anxiety, or mood swings  MUSCULOSKELETAL: No joint pain or swelling  NEUROLOGICAL: As per HPI          OBJECTIVE:    Vital Signs Last 24 Hrs  T(C): 37 (10 Jul 2024 20:44), Max: 37 (10 Jul 2024 20:44)  T(F): 98.6 (10 Jul 2024 20:44), Max: 98.6 (10 Jul 2024 20:44)  HR: 77 (10 Jul 2024 20:44) (77 - 104)  BP: 120/85 (10 Jul 2024 20:44) (120/85 - 140/90)  RR: 20 (10 Jul 2024 20:44) (18 - 20)  SpO2: 100% (10 Jul 2024 20:44) (97% - 100%)  Parameters below as of 10 Jul 2024 20:44  Patient On (Oxygen Delivery Method): room air      General Examination:  General: No acute distress  HEENT: Atraumatic, Normocephalic  Respiratory: Rapid rate, Diminished breath sounds b/l  Cardiovascular: RRR.  Normal S1 & S2.  Normal b/l radial and pedal pulses.    Neurological Examination:  General / Mental Status: AAO x 3.  No aphasia or dysarthria.  Naming and repetition intact.  Cranial Nerves: VFF x 4.  PERRL.  EOMI x 2, No nystagmus or diplopia.  B/l V1-V3 equal and intact to light touch and pinprick.  Symmetric facial movement and palate elevation.  B/l hearing equal to finger rub.  5/5 strength with b/l sternocleidomastoid and trapezius.  Midline tongue protrusion, with no atrophy or fasciculations.  Motor: Normal bulk & tone in all four extremities.  At least 4/5 strength throughout right arm and right leg.  5/5 strength throughout left arm and left leg.  No downward drift, rigidity, spasticity, or tremors in any of the four extremities.  Sensory: Intact to light touch and pinprick in all four extremities.  Reflex: 1+ and symmetric at b/l biceps, triceps, brachioradialis, patellae, and ankles.  Downgoing toes b/l.  Coordination: No dysmetria with b/l finger-to-nose and heel raise tests.  Gait and Romberg sign testing deferred per patient preference.      NIHSS Score:    LOC - 0  LOC Questions - 0  LOC Commands - 0  Gaze Preference - 0  Visual Fields - 0  Facial Palsy - 0  Motor Arm Left - 0  Motor Arm Right - 0  Motor Leg Left - 0  Motor Leg Right - 0  Limb Ataxia - 0  Sensory - 0  Language - 0  Speech - 0  Extinction - 0    NIHSS Score Total: 0 - No IV TNK because patient presented outside of the time window with NIHSS 0    Modified Huang Scale: 2          LABORATORY VALUES:                        14.0   7.65  )-----------( 328      ( 10 Jul 2024 13:55 )             41.8       07-10    138  |  106  |  12  ----------------------------<  106<H>  3.9   |  25  |  0.74    Ca    9.1      10 Jul 2024 13:55    TPro  8.0  /  Alb  4.1  /  TBili  0.9  /  DBili  x   /  AST  15  /  ALT  25  /  AlkPhos  78  07-10          NEUROIMAGING:      CT Head & CTA Head/Neck & CT Perfusion Head (7/1/24):  - No acute intracranial structural abnormality or focal hypoperfusion  - Left supraclinoid aneurysm with 4.5 mm diameter  - No large vessel cutoff or hemodynamically significant stenosis          Please contact the Neurology consult service with any neurological questions.    Rbo Vicente MD   of Neurology  [facility] at [facility]

## 2024-07-10 NOTE — H&P ADULT - HISTORY OF PRESENT ILLNESS
44-year-old woman with no medical history except a recent diagnosis of a 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  At the time she was unable to lift her right leg. Symptoms are associated with dizziness, nausea, and blurred vision.  She was in the emergency room about 2 weeks ago for similar dizziness and was discharged with meclizine as needed.  She endorses about 2 weeks of intermittent palpitations mostly at night with intermittent chest pressure.  She says she has had hot flashes since she was 38 years old.    She had 2 episodes of diarrhea on the morning of admission. She has right leg swelling that comes and goes.  She also says she has been sleeping more than usual recently.  On exam she does complain of a frontal headache with right-sided cheek pain that is not influenced by chewing.  She denies any photophobia.  She has a history of migraines most recently 5 years ago and does not usually get headaches.    CT head confirmed a 4.5mm supraclinoid aneurysm on the left projecting to the superior anterior. This is unlikely the cause of her symptoms.  On exam she does have mild right sided upper extremity and lower extremity weakness.  She does not have disturbances to sensation.    She will be admitted to telemetry for TIA workup.  Also some concern for thyroid condition.

## 2024-07-10 NOTE — CONSULT NOTE ADULT - TIME BILLING
I counseled the patient, family at bedside, and primary team about the testing indicated to help determine the etiology of the patient's right-sided motor and sensory symptoms.

## 2024-07-10 NOTE — H&P ADULT - PROBLEM SELECTOR PLAN 1
Concern for TIA versus seizure versus Trent's paralysis versus demyelinating disease, per neuro  Right-sided weakness  Admit to telemetry for monitoring  CT head negative for CVA    MRI recommended  Follow-up EEG  Follow up B12, folate  Neuro checks Q4  Follow-up neuro recs -- Vicente   meclizine prn

## 2024-07-10 NOTE — ED ADULT TRIAGE NOTE - CHIEF COMPLAINT QUOTE
BIBA, notification for stroke, right side hand weakness with tingling 9:00 AM, c/o dizziness and nausea, BG=97 mg/dl @scene, LKW 9:00 AM

## 2024-07-10 NOTE — ED PROVIDER NOTE - CLINICAL SUMMARY MEDICAL DECISION MAKING FREE TEXT BOX
44-year-old female who presented with right upper and lower extremity weakness   Associated with dizzinessthat began at 9 AM this morning.  According to patient, she has a history of subarachnoid aneurysm.  CT head and CTA/CT perfusion study showed no acute abnormalities.  Her symptoms resolved without intervention with exception of some residual tingling in the extremities. labs are essentially unremarkable.  Will admit patient due to residual tingling for further evaluation with MRI, neurology.

## 2024-07-10 NOTE — ED PROVIDER NOTE - OBJECTIVE STATEMENT
44-year-old female with past medical history significant for recent diagnosis of subarachnoid aneurysm who presents with right upper and lower extremity weakness with onset at 0 900 this morning.  This is associated with nausea and dizziness.  Denies headache.

## 2024-07-10 NOTE — H&P ADULT - PROBLEM SELECTOR PLAN 4
not needed at this time     IMPROVE VTE Individual Risk Assessment    RISK                                                                Points  [  ] Previous VTE                                                  3  [  ] Thrombophilia                                               2  [  ] Lower limb paralysis                                      2        (unable to hold up >15 seconds)    [  ] Current Cancer                                              2         (within 6 months)  [  ] Immobilization > 24 hrs                                1  [  ] ICU/CCU stay > 24 hours                              1  [  ] Age > 60                                                      1  IMPROVE VTE Score ____0_____

## 2024-07-10 NOTE — H&P ADULT - NSHPPHYSICALEXAM_GEN_ALL_CORE
Gen: AOx3, NAD, non-toxic, pleasant  HEAD:  Atraumatic, Normocephalic  EYES: PERRLA, conjunctiva and sclera clear  ENT: Moist mucous membranes  NECK: Supple, No JVD  CV: +tachycardic and regular   Resp: Clear bilat, no resp distress, no crackles/wheezes  Abd: Soft, nontender, +BS  Ext: No LE edema, no cyanosis, LE pulses present  IV/Skin: No skin rash  Msk: No joint swelling  Neuro: AAOx3. +4/5 R arm and leg weakness   Psych: no anxiety, no delusional ideas, no suicidal ideation

## 2024-07-11 DIAGNOSIS — G45.9 TRANSIENT CEREBRAL ISCHEMIC ATTACK, UNSPECIFIED: ICD-10-CM

## 2024-07-11 DIAGNOSIS — R00.2 PALPITATIONS: ICD-10-CM

## 2024-07-11 DIAGNOSIS — Z29.9 ENCOUNTER FOR PROPHYLACTIC MEASURES, UNSPECIFIED: ICD-10-CM

## 2024-07-11 DIAGNOSIS — I67.1 CEREBRAL ANEURYSM, NONRUPTURED: ICD-10-CM

## 2024-07-11 LAB
ALBUMIN SERPL ELPH-MCNC: 3.7 G/DL — SIGNIFICANT CHANGE UP (ref 3.5–5)
ALP SERPL-CCNC: 74 U/L — SIGNIFICANT CHANGE UP (ref 40–120)
ALT FLD-CCNC: 24 U/L DA — SIGNIFICANT CHANGE UP (ref 10–60)
ANION GAP SERPL CALC-SCNC: 8 MMOL/L — SIGNIFICANT CHANGE UP (ref 5–17)
AST SERPL-CCNC: 15 U/L — SIGNIFICANT CHANGE UP (ref 10–40)
BASOPHILS # BLD AUTO: 0.06 K/UL — SIGNIFICANT CHANGE UP (ref 0–0.2)
BASOPHILS NFR BLD AUTO: 1.1 % — SIGNIFICANT CHANGE UP (ref 0–2)
BILIRUB SERPL-MCNC: 1.1 MG/DL — SIGNIFICANT CHANGE UP (ref 0.2–1.2)
BUN SERPL-MCNC: 18 MG/DL — SIGNIFICANT CHANGE UP (ref 7–18)
CALCIUM SERPL-MCNC: 8.9 MG/DL — SIGNIFICANT CHANGE UP (ref 8.4–10.5)
CHLORIDE SERPL-SCNC: 108 MMOL/L — SIGNIFICANT CHANGE UP (ref 96–108)
CO2 SERPL-SCNC: 24 MMOL/L — SIGNIFICANT CHANGE UP (ref 22–31)
CREAT SERPL-MCNC: 0.71 MG/DL — SIGNIFICANT CHANGE UP (ref 0.5–1.3)
EGFR: 107 ML/MIN/1.73M2 — SIGNIFICANT CHANGE UP
EOSINOPHIL # BLD AUTO: 0.36 K/UL — SIGNIFICANT CHANGE UP (ref 0–0.5)
EOSINOPHIL NFR BLD AUTO: 6.5 % — HIGH (ref 0–6)
FOLATE SERPL-MCNC: 11.8 NG/ML — SIGNIFICANT CHANGE UP
GLUCOSE SERPL-MCNC: 98 MG/DL — SIGNIFICANT CHANGE UP (ref 70–99)
HCG UR QL: NEGATIVE — SIGNIFICANT CHANGE UP
HCT VFR BLD CALC: 40 % — SIGNIFICANT CHANGE UP (ref 34.5–45)
HGB BLD-MCNC: 13.5 G/DL — SIGNIFICANT CHANGE UP (ref 11.5–15.5)
IMM GRANULOCYTES NFR BLD AUTO: 0.2 % — SIGNIFICANT CHANGE UP (ref 0–0.9)
LYMPHOCYTES # BLD AUTO: 1.91 K/UL — SIGNIFICANT CHANGE UP (ref 1–3.3)
LYMPHOCYTES # BLD AUTO: 34.2 % — SIGNIFICANT CHANGE UP (ref 13–44)
MCHC RBC-ENTMCNC: 31.2 PG — SIGNIFICANT CHANGE UP (ref 27–34)
MCHC RBC-ENTMCNC: 33.8 GM/DL — SIGNIFICANT CHANGE UP (ref 32–36)
MCV RBC AUTO: 92.4 FL — SIGNIFICANT CHANGE UP (ref 80–100)
MONOCYTES # BLD AUTO: 0.52 K/UL — SIGNIFICANT CHANGE UP (ref 0–0.9)
MONOCYTES NFR BLD AUTO: 9.3 % — SIGNIFICANT CHANGE UP (ref 2–14)
NEUTROPHILS # BLD AUTO: 2.72 K/UL — SIGNIFICANT CHANGE UP (ref 1.8–7.4)
NEUTROPHILS NFR BLD AUTO: 48.7 % — SIGNIFICANT CHANGE UP (ref 43–77)
NRBC # BLD: 0 /100 WBCS — SIGNIFICANT CHANGE UP (ref 0–0)
PLATELET # BLD AUTO: 308 K/UL — SIGNIFICANT CHANGE UP (ref 150–400)
POTASSIUM SERPL-MCNC: 3.6 MMOL/L — SIGNIFICANT CHANGE UP (ref 3.5–5.3)
POTASSIUM SERPL-SCNC: 3.6 MMOL/L — SIGNIFICANT CHANGE UP (ref 3.5–5.3)
PROT SERPL-MCNC: 7.3 G/DL — SIGNIFICANT CHANGE UP (ref 6–8.3)
RBC # BLD: 4.33 M/UL — SIGNIFICANT CHANGE UP (ref 3.8–5.2)
RBC # FLD: 13.3 % — SIGNIFICANT CHANGE UP (ref 10.3–14.5)
SODIUM SERPL-SCNC: 140 MMOL/L — SIGNIFICANT CHANGE UP (ref 135–145)
TSH SERPL-MCNC: 3.02 UU/ML — SIGNIFICANT CHANGE UP (ref 0.34–4.82)
VIT B12 SERPL-MCNC: 645 PG/ML — SIGNIFICANT CHANGE UP (ref 232–1245)
WBC # BLD: 5.58 K/UL — SIGNIFICANT CHANGE UP (ref 3.8–10.5)
WBC # FLD AUTO: 5.58 K/UL — SIGNIFICANT CHANGE UP (ref 3.8–10.5)

## 2024-07-11 PROCEDURE — 70552 MRI BRAIN STEM W/DYE: CPT | Mod: 26

## 2024-07-11 PROCEDURE — 99233 SBSQ HOSP IP/OBS HIGH 50: CPT

## 2024-07-11 PROCEDURE — 99232 SBSQ HOSP IP/OBS MODERATE 35: CPT

## 2024-07-11 PROCEDURE — 95816 EEG AWAKE AND DROWSY: CPT | Mod: 26

## 2024-07-11 RX ORDER — ACETAMINOPHEN 325 MG
650 TABLET ORAL EVERY 6 HOURS
Refills: 0 | Status: DISCONTINUED | OUTPATIENT
Start: 2024-07-11 | End: 2024-07-12

## 2024-07-11 RX ORDER — ASPIRIN 325 MG/1
81 TABLET, FILM COATED ORAL DAILY
Refills: 0 | Status: DISCONTINUED | OUTPATIENT
Start: 2024-07-11 | End: 2024-07-12

## 2024-07-11 RX ORDER — ONDANSETRON HYDROCHLORIDE 2 MG/ML
4 INJECTION INTRAMUSCULAR; INTRAVENOUS EVERY 8 HOURS
Refills: 0 | Status: DISCONTINUED | OUTPATIENT
Start: 2024-07-11 | End: 2024-07-12

## 2024-07-11 RX ORDER — MAGNESIUM, ALUMINUM HYDROXIDE 400-400
30 TABLET,CHEWABLE ORAL EVERY 4 HOURS
Refills: 0 | Status: DISCONTINUED | OUTPATIENT
Start: 2024-07-11 | End: 2024-07-12

## 2024-07-11 RX ORDER — MECLIZINE HCL 25 MG
25 TABLET ORAL EVERY 24 HOURS
Refills: 0 | Status: DISCONTINUED | OUTPATIENT
Start: 2024-07-11 | End: 2024-07-12

## 2024-07-11 RX ORDER — CLOPIDOGREL BISULFATE 75 MG/1
75 TABLET, FILM COATED ORAL DAILY
Refills: 0 | Status: DISCONTINUED | OUTPATIENT
Start: 2024-07-11 | End: 2024-07-12

## 2024-07-11 RX ORDER — ATORVASTATIN CALCIUM 20 MG/1
40 TABLET, FILM COATED ORAL AT BEDTIME
Refills: 0 | Status: DISCONTINUED | OUTPATIENT
Start: 2024-07-11 | End: 2024-07-12

## 2024-07-11 RX ADMIN — ATORVASTATIN CALCIUM 40 MILLIGRAM(S): 20 TABLET, FILM COATED ORAL at 21:16

## 2024-07-11 RX ADMIN — ASPIRIN 81 MILLIGRAM(S): 325 TABLET, FILM COATED ORAL at 17:44

## 2024-07-11 RX ADMIN — CLOPIDOGREL BISULFATE 75 MILLIGRAM(S): 75 TABLET, FILM COATED ORAL at 17:31

## 2024-07-11 NOTE — PROGRESS NOTE ADULT - PROBLEM SELECTOR PLAN 3
Reports palpitations that are worse at night starting April 2024  Intermittent chest pressure  Hot flashes since age 38  Nausea and dizziness  Headache (diffuse sharp pain, band-like, with photophobia, that can last for the whole day), takes Excedrin   TSH WNL     On tele for monitoring Reports palpitations that are worse at night starting April 2024  Intermittent chest pressure  Hot flashes since age 38  intermittent nausea and dizziness  intermittent Headache (diffuse sharp pain, band-like, with photophobia, that can last for the whole day), takes Excedrin   TSH WNL     On tele for monitoring Reports palpitations that are worse at night starting April 2024  Intermittent chest pressure  Hot flashes since age 38  intermittent nausea and dizziness  intermittent Headache (diffuse sharp pain, band-like, with photophobia, that can last for the whole day), takes Excedrin   TSH WNL     On tele for monitoring, Echo ordered

## 2024-07-11 NOTE — PROGRESS NOTE ADULT - ATTENDING COMMENTS
This is a late entry, patient was evaluated on 7/11    Ms. Snell is a 45 y/o female from home w/ PMH of asthma- on albuterol prn, migraines and recent diagnosis of small supraclinoid aneurysm presents to ED w/ c/o right sided weakness, numbness and tingling sensation since 9am this morning. She reports that symptoms have improved but still has ongoing numbness and tingling. She reports that she first started having these symptoms approx 2 weeks ago, it started w/ headache followed by weakness which resolved but has intermittent numbness and tingling on Rt side since then. She also reports nausea and dizziness but  denies any ongoing headaches, blurry vision or other symptoms. Admitted for further evaluation     A/P:  #Rt sided numbness/weakness- suspect TIA vs demyelinating disorder vs complex migraines   #Asthma- not in exacerbation   #small supraclinoid aneurysm  #DVT ppx     Plan:  -MR head un- remarkable. vitamin b12 folate wnl. D/w Dr Vicente from neurology service, suspect that she may have TIA. Advised to start asa, palvix x21 days and statin.   -ECHO pending   -C/w tele, no events reported   -c/w albuterol prn   -Patient has an appointment w/ neuro-surgeon Dr. Reza Espinosa.

## 2024-07-11 NOTE — PATIENT PROFILE ADULT - FALL HARM RISK - HARM RISK INTERVENTIONS

## 2024-07-11 NOTE — EEG REPORT - NS EEG TEXT BOX
[facility]: [address], [place] [address], Phone: [phone]  [facility]: [address] [address], [place] [address], Phone: [phone]  [facility]: [address] [place] [address], Phone: [phone]    Patient Name: ROMY ROSE    Age: 44 year, : 1980  MRN #: -, [place]: [facility]  B-  Referring Physician: DR HUERTA  EEG #: [id number]    Study Date: 2024   Start Time: 1:37:01 PM      End Date:          End Time: 02:20:56 PM     Study Duration: 26.3 min      History: -?TIA      Medication  No ASM    Interpretation:    [[[Abbreviation Key:  PDR=alpha rhythm/posterior dominant rhythm. A-P=anterior posterior.  Amplitude: ‘very low’:<20; ‘low’:20-49; ‘medium’:; ‘high’:>150uV.  Persistence for periodic/rhythmic patterns (% of epoch) ‘rare’:<1%; ‘occasional’:1-10%; ‘frequent’:10-50%; ‘abundant’:50-90%; ‘continuous’:>90%.  Persistence for sporadic discharges: ‘rare’:<1/hr; ‘occasional’:1/min-1/hr; ‘frequent’:>1/min; ‘abundant’:>1/10 sec.  RPP=rhythmic and periodic patterns; GRDA=generalized rhythmic delta activity; FIRDA=frontal intermittent GRDA; LRDA=lateralized rhythmic delta activity; TIRDA=temporal intermittent rhythmic delta activity;  LPD=PLED=lateralized periodic discharges; GPD=generalized periodic discharges; BIPDs =bilateral independent periodic discharges; Mf=multifocal; SIRPDs=stimulus induced rhythmic, periodic, or ictal appearing discharges; BIRDs=brief potentially ictal rhythmic discharges >4 Hz, lasting .5-10s; PFA (paroxysmal bursts >13 Hz or =8 Hz <10s).  Modifiers: +F=with fast component; +S=with spike component; +R=with rhythmic component.  S-B=burst suppression pattern.  Max=maximal. N1-drowsy; N2-stage II sleep; N3-slow wave sleep. SSS/BETS=small sharp spikes/benign epileptiform transients of sleep. HV=hyperventilation; PS=photic stimulation]]]      Daily EEG Visual Analysis    FINDINGS:      Background:  Symmetry: Symmetric  Continuous: Continuous  PDR: Symmetric, 11 Hz activity, with amplitude to 40 uV, that attenuated to eye opening. Low amplitude frontal beta noted in wakefulness.  Reactivity: Present  Voltage: Normal, [defined typically between 20-150uV]  Anterior Posterior Gradient: Present  Breach: Absent    Background Slowing:  Generalized slowing: None was present.  Focal slowing: None was present.    State Changes:   -Drowsiness was characterized by fragmentation, attenuation, and slowing of the background activity.    -Stage II sleep transients were not recorded.    Sporadic Epileptiform Discharges:   None    Rhythmic and Periodic Patterns (RPPs):  None     Electrographic and Electroclinical seizures:  None    Other Clinical Events:  None    Activation Procedures:   -Hyperventilation was not performed.    -Photic stimulation was performed and did not elicit any abnormalities.      Artifacts:  Intermittent myogenic and movement artifacts were noted.    ECG:  The heart rate on single channel ECG was predominantly between 70 - 80 BPM.    EEG Summary / Classification:  Normal EEG in the awake / drowsy states.      EEG Impression / Clinical Correlate:  Normal EEG study.  No epileptic discharges recorded.  No seizures recorded.      ________________________________________    Jeanmarie Jackson DO  Fellow, [facility]    MD ANDRIA Nichols  Director, Continuous EEG Monitoring Program, [facility]   and Epilepsy Fellowship ,   Department of Neurology, [facility] [employer]    [facility] EEG Reading Room Ph#: [phone]  Epilepsy Answering Service after 5PM and before 8:30AM: Ph#: [phone]

## 2024-07-11 NOTE — PATIENT PROFILE ADULT - TRANSPORTATION
Patient resting in bed. Respirations easy and unlabored. No distress noted. Family at bedside. Updated on plan of care.   no

## 2024-07-11 NOTE — PROGRESS NOTE ADULT - TIME BILLING
I counseled the patient and primary team about the patient's neurodiagnostic test findings, as well as the further testing to complete the patient's workup.

## 2024-07-11 NOTE — PROGRESS NOTE ADULT - PROBLEM SELECTOR PLAN 1
Concern for TIA versus seizure versus Trent's paralysis versus demyelinating disease, per neuro  Right-sided weakness  Admit to telemetry for monitoring  CT head and neck negative for CVA  B12, folate, TSH WNL, Tpn WNL  07/11 MRI: no acute infarct, hemorrhage or mass effect, no evidence of demyelinating lesion, 5mm outpouching rising from L supraclinoid ICA and from R cavernous ICA suggesting aneurysm     Follow-up EEG  Neuro checks Q4  Follow-up neuro recs -- Dr. Vicnete   meclizine prn Concern for TIA versus seizure versus Trent's paralysis versus demyelinating disease, per neuro  Right-sided weakness  Admit to telemetry for monitoring  CT head and neck negative for CVA  B12, folate, TSH WNL, Tpn WNL  07/11 MRI: no acute infarct, hemorrhage or mass effect, no evidence of demyelinating lesion, 5mm outpouching rising from L supraclinoid ICA and from R cavernous ICA suggesting aneurysm     Follow-up EEG  Neuro checks Q4  Neuro Dr. Vicente consulted: DAPT, Echo, PT eval  meclizine prn Concern for TIA versus seizure versus Trent's paralysis versus demyelinating disease, per neuro  Right-sided weakness  Admit to telemetry for monitoring  CT head and neck negative for CVA  B12, folate, TSH WNL, Tpn WNL  07/11 MRI: no acute infarct, hemorrhage or mass effect, no evidence of demyelinating lesion, 5mm outpouching rising from L supraclinoid ICA and from R cavernous ICA suggesting aneurysm     Follow-up EEG  Neuro checks Q4  Neuro Dr. Vicente consulted:  started DAPT, Atorvastatin 40  f/u Hgb A1c, fasting lipid, PT eval   meclizine prn

## 2024-07-11 NOTE — PROGRESS NOTE ADULT - SUBJECTIVE AND OBJECTIVE BOX
NEUROLOGY FOLLOW-UP NOTE    NAME:  ROMY ROSE      ASSESSMENT:  44 RHF presenting with dizziness and right-sided hemiparesis, resolved, concerning for transient ischemic attack, without neurod vs. 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      RECOMMENDATIONS:    - Admit to Telemetry unit: Q4H VS & Neurochecks    - MRI Brain w/wo Gadolinium approved to evaluate for intracranial abnormalities, including demyelinating disease    - Routine EEG approved to evaluate for seizure tendency    - Check Vitamin B12, Folate, and TSH levels, and treat if abnormal    - Metabolic and infectious workup and treatment as per primary team    - PT/OT    - DVT ppx: SCDs, Enoxaparin or Heparin            NOTE TO BE COMPLETED - PLEASE REFER TO ABOVE ONLY AND IGNORE INFORMATION BELOW    ******************************    HPI:  44-year-old woman with no medical history except a recent diagnosis of a 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  At the time she was unable to lift her right leg. Symptoms are associated with dizziness, nausea, and blurred vision.  She was in the emergency room about 2 weeks ago for similar dizziness and was discharged with meclizine as needed.  She endorses about 2 weeks of intermittent palpitations mostly at night with intermittent chest pressure.  She says she has had hot flashes since she was 38 years old.    She had 2 episodes of diarrhea on the morning of admission. She has right leg swelling that comes and goes.  She also says she has been sleeping more than usual recently.  On exam she does complain of a frontal headache with right-sided cheek pain that is not influenced by chewing.  She denies any photophobia.  She has a history of migraines most recently 5 years ago and does not usually get headaches.    CT head confirmed a 4.5mm supraclinoid aneurysm on the left projecting to the superior anterior. This is unlikely the cause of her symptoms.  On exam she does have mild right sided upper extremity and lower extremity weakness.  She does not have disturbances to sensation.    She will be admitted to telemetry for TIA workup.  Also some concern for thyroid condition. (10 Jul 2024 18:10)      NEURO HPI:      INTERVAL HISTORY:      MEDICATIONS:  acetaminophen     Tablet .. 650 milliGRAM(s) Oral every 6 hours PRN  aluminum hydroxide/magnesium hydroxide/simethicone Suspension 30 milliLiter(s) Oral every 4 hours PRN  aspirin  chewable 81 milliGRAM(s) Oral daily  clopidogrel Tablet 75 milliGRAM(s) Oral daily  meclizine 25 milliGRAM(s) Oral every 24 hours PRN  melatonin 3 milliGRAM(s) Oral at bedtime PRN  ondansetron Injectable 4 milliGRAM(s) IV Push every 8 hours PRN      ALLERGIES:  No Known Allergies      REVIEW OF SYSTEMS:  Fourteen systems reviewed and negative except as in HPI / Interval History.          OBJECTIVE:  Vital Signs Last 24 Hrs  T(C): 36.6 (11 Jul 2024 13:59), Max: 37.1 (11 Jul 2024 10:35)  T(F): 97.9 (11 Jul 2024 13:59), Max: 98.8 (11 Jul 2024 10:35)  HR: 70 (11 Jul 2024 13:59) (69 - 77)  BP: 115/86 (11 Jul 2024 13:59) (98/76 - 120/85)  BP(mean): 82 (11 Jul 2024 01:18) (82 - 82)  RR: 18 (11 Jul 2024 13:59) (14 - 20)  SpO2: 99% (11 Jul 2024 13:59) (96% - 100%)    Parameters below as of 11 Jul 2024 13:59  Patient On (Oxygen Delivery Method): room air        General Examination:  General: No acute distress  HEENT: Atraumatic, Normocephalic  Respiratory: CTA B/l.  No crackles, rhonchi, or wheezes.  Cardiovascular: RRR.  Normal S1 & S2.  Normal b/l radial and pedal pulses.    Neurological Examination:  General / Mental Status: AAO x 3.  No aphasia or dysarthria.  Naming and repetition intact.  Cranial Nerves: VFF x 4.  PERRL.  EOMI x 2, No nystagmus or diplopia.  B/l V1-V3 equal and intact to light touch and pinprick.  Symmetric facial movement and palate elevation.  B/l hearing equal to finger rub.  5/5 strength with b/l sternocleidomastoid and trapezius.  Midline tongue protrusion, with no atrophy or fasciculations.  Motor: Normal bulk & tone in all four extremities.  5/5 strength throughout all four extremities.  No downward drift, rigidity, spasticity, or tremors in any of the four extremities.  Sensory: Intact to light touch and pinprick in all four extremities.  Negative Romberg.  Reflex: 2+ and symmetric at b/l biceps, triceps, brachioradialis, patellae, and ankles.  Downgoing toes b/l.  Coordination: No dysmetria with b/l finger-to-nose and heel raise tests.  Symmetric rapid alternating movements b/l.  Gait: Normal, narrow-based gait.  No difficulty with tiptoe, heel, and tandem gaits.        LABORATORY VALUES:                          13.5   5.58  )-----------( 308      ( 11 Jul 2024 07:00 )             40.0       07-11    140  |  108  |  18  ----------------------------<  98  3.6   |  24  |  0.71    Ca    8.9      11 Jul 2024 07:00    TPro  7.3  /  Alb  3.7  /  TBili  1.1  /  DBili  x   /  AST  15  /  ALT  24  /  AlkPhos  74  07-11    Glucose Trend  07-11-24 @ 07:00   -  98 -- --  07-10-24 @ 13:55   -  106<H> -- --  07-10-24 @ 13:52   -  -- -- 106<H>    Folate, Serum: 11.8 ng/mL (07-11-24 @ 07:00)  Vitamin B12, Serum: 645 pg/mL (07-11-24 @ 07:00)    Thyroid Stimulating Hormone, Serum: 3.02 uU/mL (07.11.24 @ 00:35)           NEUROIMAGING:          Please contact the Neurology consult service with any neurological questions.      Rob Vicente MD   of Neurology  [facility]         NEUROLOGY FOLLOW-UP NOTE    NAME:  ROMY ROSE      ASSESSMENT:  44 RHF presenting with dizziness and right-sided hemiparesis, resolved, concerning for transient ischemic attack, without neurodiagnostic evidence of seizure with Trent's paralysis or demyelinating disease      RECOMMENDATIONS:    1. TIA/Stroke workup  - Telemetry monitoring while inpatient  - Transthoracic Echocardiogram  - Hemoglobin A1c  - Fasting lipid panel    2. Stroke prevention  - Q4H Neurochecks & Vital signs  - Patient has passed a bedside swallow evaluation  - Aspirin 81mg PO Daily  - Clopidogrel 75mg PO Daily x 21 days  - Atorvastatin 40mg PO QHS (may adjust dose to achieve goal LDL < 70 mg/dL)  - Treat BP if over 140/90 (goal /80) - No role for permission at this time for resolved symptoms  - PT/OT  - DVT ppx: SCDs, Enoxaparin or Heparin          NOTE TO BE COMPLETED - PLEASE REFER TO ABOVE ONLY AND IGNORE INFORMATION BELOW    ******************************    HPI:  44-year-old woman with no medical history except a recent diagnosis of a 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  At the time she was unable to lift her right leg. Symptoms are associated with dizziness, nausea, and blurred vision.  She was in the emergency room about 2 weeks ago for similar dizziness and was discharged with meclizine as needed.  She endorses about 2 weeks of intermittent palpitations mostly at night with intermittent chest pressure.  She says she has had hot flashes since she was 38 years old.    She had 2 episodes of diarrhea on the morning of admission. She has right leg swelling that comes and goes.  She also says she has been sleeping more than usual recently.  On exam she does complain of a frontal headache with right-sided cheek pain that is not influenced by chewing.  She denies any photophobia.  She has a history of migraines most recently 5 years ago and does not usually get headaches.    CT head confirmed a 4.5mm supraclinoid aneurysm on the left projecting to the superior anterior. This is unlikely the cause of her symptoms.  On exam she does have mild right sided upper extremity and lower extremity weakness.  She does not have disturbances to sensation.    She will be admitted to telemetry for TIA workup.  Also some concern for thyroid condition. (10 Jul 2024 18:10)      NEURO HPI:      INTERVAL HISTORY:      MEDICATIONS:  acetaminophen     Tablet .. 650 milliGRAM(s) Oral every 6 hours PRN  aluminum hydroxide/magnesium hydroxide/simethicone Suspension 30 milliLiter(s) Oral every 4 hours PRN  aspirin  chewable 81 milliGRAM(s) Oral daily  clopidogrel Tablet 75 milliGRAM(s) Oral daily  meclizine 25 milliGRAM(s) Oral every 24 hours PRN  melatonin 3 milliGRAM(s) Oral at bedtime PRN  ondansetron Injectable 4 milliGRAM(s) IV Push every 8 hours PRN      ALLERGIES:  No Known Allergies      REVIEW OF SYSTEMS:  Fourteen systems reviewed and negative except as in HPI / Interval History.          OBJECTIVE:  Vital Signs Last 24 Hrs  T(C): 36.6 (11 Jul 2024 13:59), Max: 37.1 (11 Jul 2024 10:35)  T(F): 97.9 (11 Jul 2024 13:59), Max: 98.8 (11 Jul 2024 10:35)  HR: 70 (11 Jul 2024 13:59) (69 - 77)  BP: 115/86 (11 Jul 2024 13:59) (98/76 - 120/85)  BP(mean): 82 (11 Jul 2024 01:18) (82 - 82)  RR: 18 (11 Jul 2024 13:59) (14 - 20)  SpO2: 99% (11 Jul 2024 13:59) (96% - 100%)  Parameters below as of 11 Jul 2024 13:59  Patient On (Oxygen Delivery Method): room air      General Examination:  General: No acute distress  HEENT: Atraumatic, Normocephalic  Respiratory: CTA B/l.  No crackles, rhonchi, or wheezes.  Cardiovascular: RRR.  Normal S1 & S2.  Normal b/l radial and pedal pulses.    Neurological Examination:  General / Mental Status: AAO x 3.  No aphasia or dysarthria.  Naming and repetition intact.  Cranial Nerves: VFF x 4.  PERRL.  EOMI x 2, No nystagmus or diplopia.  B/l V1-V3 equal and intact to light touch and pinprick.  Symmetric facial movement and palate elevation.  B/l hearing equal to finger rub.  5/5 strength with b/l sternocleidomastoid and trapezius.  Midline tongue protrusion, with no atrophy or fasciculations.  Motor: Normal bulk & tone in all four extremities.  5/5 strength throughout all four extremities.  No downward drift, rigidity, spasticity, or tremors in any of the four extremities.  Sensory: Intact to light touch and pinprick in all four extremities.  Negative Romberg.  Reflex: 2+ and symmetric at b/l biceps, triceps, brachioradialis, patellae, and ankles.  Downgoing toes b/l.  Coordination: No dysmetria with b/l finger-to-nose and heel raise tests.  Symmetric rapid alternating movements b/l.  Gait: Normal, narrow-based gait.  No difficulty with tiptoe, heel, and tandem gaits.          LABORATORY VALUES:                          13.5   5.58  )-----------( 308      ( 11 Jul 2024 07:00 )             40.0       07-11    140  |  108  |  18  ----------------------------<  98  3.6   |  24  |  0.71    Ca    8.9      11 Jul 2024 07:00    TPro  7.3  /  Alb  3.7  /  TBili  1.1  /  DBili  x   /  AST  15  /  ALT  24  /  AlkPhos  74  07-11    Glucose Trend  07-11-24 @ 07:00   -  98 -- --  07-10-24 @ 13:55   -  106<H> -- --  07-10-24 @ 13:52   -  -- -- 106<H>    Folate, Serum: 11.8 ng/mL (07-11-24 @ 07:00)  Vitamin B12, Serum: 645 pg/mL (07-11-24 @ 07:00)    Thyroid Stimulating Hormone, Serum: 3.02 uU/mL (07.11.24 @ 00:35)           NEUROIMAGING:          Please contact the Neurology consult service with any neurological questions.      Rob Vicente MD   of Neurology  [facility] at [facility]         NEUROLOGY FOLLOW-UP NOTE    NAME:  ROMY ROSE      ASSESSMENT:  44 RHF presenting with dizziness and right-sided hemiparesis, resolved, concerning for transient ischemic attack, without neurodiagnostic evidence of seizure with Trent's paralysis or demyelinating disease      RECOMMENDATIONS:    1. TIA/Stroke workup  - Telemetry monitoring while inpatient  - Transthoracic Echocardiogram  - Hemoglobin A1c  - Fasting lipid panel    2. Stroke prevention  - Q4H Neurochecks & Vital signs  - Patient has passed a bedside swallow evaluation  - Aspirin 81mg PO Daily  - Clopidogrel 75mg PO Daily x 21 days  - Atorvastatin 40mg PO QHS (may adjust dose to achieve goal LDL < 70 mg/dL)  - Treat BP if over 140/90 (goal /80) - No role for permission at this time for resolved symptoms  - PT/OT  - DVT ppx: SCDs, Enoxaparin or Heparin          ******************************    HPI:  44-year-old woman with no medical history except a recent diagnosis of a 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  At the time she was unable to lift her right leg. Symptoms are associated with dizziness, nausea, and blurred vision.  She was in the emergency room about 2 weeks ago for similar dizziness and was discharged with meclizine as needed.  She endorses about 2 weeks of intermittent palpitations mostly at night with intermittent chest pressure.  She says she has had hot flashes since she was 38 years old.    She had 2 episodes of diarrhea on the morning of admission. She has right leg swelling that comes and goes.  She also says she has been sleeping more than usual recently.  On exam she does complain of a frontal headache with right-sided cheek pain that is not influenced by chewing.  She denies any photophobia.  She has a history of migraines most recently 5 years ago and does not usually get headaches. CT head confirmed a 4.5mm supraclinoid aneurysm on the left projecting to the superior anterior. This is unlikely the cause of her symptoms.  On exam she does have mild right sided upper extremity and lower extremity weakness.  She does not have disturbances to sensation. She will be admitted to telemetry for TIA workup.  Also some concern for thyroid condition.      NEURO HPI:  44 RHF presenting with weakness and tingling affecting her right arm and right leg, as well as blurry vision, dizziness, and nausea. Symptoms have been improving while the patient has been in the Emergency Department.      INTERVAL HISTORY:  The patient's strength and sensation in the right arm and right leg have improved since admission.      MEDICATIONS:  acetaminophen     Tablet .. 650 milliGRAM(s) Oral every 6 hours PRN  aluminum hydroxide/magnesium hydroxide/simethicone Suspension 30 milliLiter(s) Oral every 4 hours PRN  aspirin  chewable 81 milliGRAM(s) Oral daily  clopidogrel Tablet 75 milliGRAM(s) Oral daily  meclizine 25 milliGRAM(s) Oral every 24 hours PRN  melatonin 3 milliGRAM(s) Oral at bedtime PRN  ondansetron Injectable 4 milliGRAM(s) IV Push every 8 hours PRN      ALLERGIES:  No Known Allergies      REVIEW OF SYSTEMS:  Fourteen systems reviewed and negative except as in HPI / Interval History.          OBJECTIVE:  Vital Signs Last 24 Hrs  T(C): 36.6 (11 Jul 2024 13:59), Max: 37.1 (11 Jul 2024 10:35)  T(F): 97.9 (11 Jul 2024 13:59), Max: 98.8 (11 Jul 2024 10:35)  HR: 70 (11 Jul 2024 13:59) (69 - 77)  BP: 115/86 (11 Jul 2024 13:59) (98/76 - 120/85)  BP(mean): 82 (11 Jul 2024 01:18) (82 - 82)  RR: 18 (11 Jul 2024 13:59) (14 - 20)  SpO2: 99% (11 Jul 2024 13:59) (96% - 100%)  Parameters below as of 11 Jul 2024 13:59  Patient On (Oxygen Delivery Method): room air      General Examination:  General: No acute distress  HEENT: Atraumatic, Normocephalic  Respiratory: CTA B/l.  No crackles, rhonchi, or wheezes.  Cardiovascular: RRR.  Normal S1 & S2.  Normal b/l radial and pedal pulses.    Neurological Examination:  General / Mental Status: AAO x 3.  No aphasia or dysarthria.  Naming and repetition intact.  Cranial Nerves: VFF x 4.  PERRL.  EOMI x 2, No nystagmus or diplopia.  B/l V1-V3 equal and intact to light touch and pinprick.  Symmetric facial movement and palate elevation.  B/l hearing equal to finger rub.  5/5 strength with b/l sternocleidomastoid and trapezius.  Midline tongue protrusion, with no atrophy or fasciculations.  Motor: Normal bulk & tone in all four extremities.  4/5 strength throughout right leg.  5/5 strength throughout b/l arms and left leg.  No downward drift, rigidity, spasticity, or tremors in any of the four extremities.  Sensory: Intact to light touch and pinprick in all four extremities.  Reflex: 1+ and symmetric at b/l biceps, triceps, brachioradialis, patellae, and ankles.  Downgoing toes b/l.  Coordination: No dysmetria with b/l finger-to-nose and heel raise tests.  Gait and Romberg sign testing deferred per patient preference.          LABORATORY VALUES:                          13.5   5.58  )-----------( 308      ( 11 Jul 2024 07:00 )             40.0       07-11    140  |  108  |  18  ----------------------------<  98  3.6   |  24  |  0.71    Ca    8.9      11 Jul 2024 07:00    TPro  7.3  /  Alb  3.7  /  TBili  1.1  /  DBili  x   /  AST  15  /  ALT  24  /  AlkPhos  74  07-11    Glucose Trend  07-11-24 @ 07:00   -  98 -- --  07-10-24 @ 13:55   -  106<H> -- --  07-10-24 @ 13:52   -  -- -- 106<H>    Folate, Serum: 11.8 ng/mL (07-11-24 @ 07:00)  Vitamin B12, Serum: 645 pg/mL (07-11-24 @ 07:00)    Thyroid Stimulating Hormone, Serum: 3.02 uU/mL (07.11.24 @ 00:35)           NEUROIMAGING:      CT Head & CTA Head/Neck & CT Perfusion Head (7/1/24):  - No acute intracranial structural abnormality or focal hypoperfusion  - Left supraclinoid aneurysm with 4.5 mm diameter  - No large vessel cutoff or hemodynamically significant stenosis      MRI Brain w/ IV Contrast (7/11/24):  - No acute intracranial structural abnormality  - B/l supraclinoid ICA aneurysms, each with 5 mm diameters      Routine EEG (7/11/24):  - Normal awake and drowsy study  - No seizure tendency identified          Please contact the Neurology consult service with any neurological questions.      Rob Vicente MD   of Neurology  [facility] at [facility]         NEUROLOGY FOLLOW-UP NOTE    NAME:  ROMY ROSE      ASSESSMENT:  44 RHF presenting with dizziness and right-sided hemiparesis, resolved, concerning for transient ischemic attack, without neurodiagnostic evidence of seizure with Trent's paralysis or demyelinating disease      RECOMMENDATIONS:    1. TIA/Stroke workup  - Telemetry monitoring while inpatient  - Transthoracic Echocardiogram  - Hemoglobin A1c  - Fasting lipid panel    2. Stroke prevention  - Q4H Neurochecks & Vital signs  - Patient has passed a bedside swallow evaluation  - Aspirin 81mg PO Daily  - Clopidogrel 75mg PO Daily x 21 days  - Atorvastatin 40mg PO QHS (may adjust dose to achieve goal LDL < 70 mg/dL)  - Treat BP if over 140/90 (goal /80) - No role for permission at this time for resolved symptoms  - PT/OT  - DVT ppx: SCDs, Enoxaparin or Heparin          ******************************    HPI:  44-year-old woman with no medical history except a recent diagnosis of a 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  At the time she was unable to lift her right leg. Symptoms are associated with dizziness, nausea, and blurred vision.  She was in the emergency room about 2 weeks ago for similar dizziness and was discharged with meclizine as needed.  She endorses about 2 weeks of intermittent palpitations mostly at night with intermittent chest pressure.  She says she has had hot flashes since she was 38 years old.    She had 2 episodes of diarrhea on the morning of admission. She has right leg swelling that comes and goes.  She also says she has been sleeping more than usual recently.  On exam she does complain of a frontal headache with right-sided cheek pain that is not influenced by chewing.  She denies any photophobia.  She has a history of migraines most recently 5 years ago and does not usually get headaches. CT head confirmed a 4.5mm supraclinoid aneurysm on the left projecting to the superior anterior. This is unlikely the cause of her symptoms.  On exam she does have mild right sided upper extremity and lower extremity weakness.  She does not have disturbances to sensation. She will be admitted to telemetry for TIA workup.  Also some concern for thyroid condition.      NEURO HPI:  44 RHF presenting with weakness and tingling affecting her right arm and right leg, as well as blurry vision, dizziness, and nausea. Symptoms have been improving while the patient has been in the Emergency Department.      INTERVAL HISTORY:  The patient's strength and sensation in the right arm and right leg have improved since admission.      MEDICATIONS:  acetaminophen     Tablet .. 650 milliGRAM(s) Oral every 6 hours PRN  aluminum hydroxide/magnesium hydroxide/simethicone Suspension 30 milliLiter(s) Oral every 4 hours PRN  aspirin  chewable 81 milliGRAM(s) Oral daily  clopidogrel Tablet 75 milliGRAM(s) Oral daily  meclizine 25 milliGRAM(s) Oral every 24 hours PRN  melatonin 3 milliGRAM(s) Oral at bedtime PRN  ondansetron Injectable 4 milliGRAM(s) IV Push every 8 hours PRN      ALLERGIES:  No Known Allergies      REVIEW OF SYSTEMS:  Fourteen systems reviewed and negative except as in HPI / Interval History.          OBJECTIVE:  Vital Signs Last 24 Hrs  T(C): 36.6 (11 Jul 2024 13:59), Max: 37.1 (11 Jul 2024 10:35)  T(F): 97.9 (11 Jul 2024 13:59), Max: 98.8 (11 Jul 2024 10:35)  HR: 70 (11 Jul 2024 13:59) (69 - 77)  BP: 115/86 (11 Jul 2024 13:59) (98/76 - 120/85)  BP(mean): 82 (11 Jul 2024 01:18) (82 - 82)  RR: 18 (11 Jul 2024 13:59) (14 - 20)  SpO2: 99% (11 Jul 2024 13:59) (96% - 100%)  Parameters below as of 11 Jul 2024 13:59  Patient On (Oxygen Delivery Method): room air      General Examination:  General: No acute distress  HEENT: Atraumatic, Normocephalic  Respiratory: CTA B/l.  No crackles, rhonchi, or wheezes.  Cardiovascular: RRR.  Normal S1 & S2.  Normal b/l radial and pedal pulses.    Neurological Examination:  General / Mental Status: AAO x 3.  No aphasia or dysarthria.  Naming and repetition intact.  Cranial Nerves: VFF x 4.  PERRL.  EOMI x 2, No nystagmus or diplopia.  B/l V1-V3 equal and intact to light touch and pinprick.  Symmetric facial movement and palate elevation.  B/l hearing equal to finger rub.  5/5 strength with b/l sternocleidomastoid and trapezius.  Midline tongue protrusion, with no atrophy or fasciculations.  Motor: Normal bulk & tone in all four extremities.  4/5 strength throughout right leg.  5/5 strength throughout b/l arms and left leg.  No downward drift, rigidity, spasticity, or tremors in any of the four extremities.  Sensory: Intact to light touch and pinprick in all four extremities.  Reflex: 1+ and symmetric at b/l biceps, triceps, brachioradialis, patellae, and ankles.  Downgoing toes b/l.  Coordination: No dysmetria with b/l finger-to-nose and heel raise tests.  Gait and Romberg sign testing deferred per patient preference.      NIHSS Score:    LOC - 0  LOC Questions - 0  LOC Commands - 0  Gaze Preference - 0  Visual Fields - 0  Facial Palsy - 0  Motor Arm Left - 0  Motor Arm Right - 0  Motor Leg Left - 0  Motor Leg Right - 0  Limb Ataxia - 0  Sensory - 0  Language - 0  Speech - 0  Extinction - 0    NIHSS Score Total: 0 - No IV TNK because patient presented outside of the time window with NIHSS 0    Modified Richland Scale: 2          LABORATORY VALUES:                          13.5   5.58  )-----------( 308      ( 11 Jul 2024 07:00 )             40.0       07-11    140  |  108  |  18  ----------------------------<  98  3.6   |  24  |  0.71    Ca    8.9      11 Jul 2024 07:00    TPro  7.3  /  Alb  3.7  /  TBili  1.1  /  DBili  x   /  AST  15  /  ALT  24  /  AlkPhos  74  07-11    Glucose Trend  07-11-24 @ 07:00   -  98 -- --  07-10-24 @ 13:55   -  106<H> -- --  07-10-24 @ 13:52   -  -- -- 106<H>    Folate, Serum: 11.8 ng/mL (07-11-24 @ 07:00)  Vitamin B12, Serum: 645 pg/mL (07-11-24 @ 07:00)    Thyroid Stimulating Hormone, Serum: 3.02 uU/mL (07.11.24 @ 00:35)           NEUROIMAGING:      CT Head & CTA Head/Neck & CT Perfusion Head (7/1/24):  - No acute intracranial structural abnormality or focal hypoperfusion  - Left supraclinoid aneurysm with 4.5 mm diameter  - No large vessel cutoff or hemodynamically significant stenosis      MRI Brain w/ IV Contrast (7/11/24):  - No acute intracranial structural abnormality  - B/l supraclinoid ICA aneurysms, each with 5 mm diameters      Routine EEG (7/11/24):  - Normal awake and drowsy study  - No seizure tendency identified          Please contact the Neurology consult service with any neurological questions.      Rob Vicente MD   of Neurology  [facility] and [facility] [employer] [facility]

## 2024-07-11 NOTE — PROGRESS NOTE ADULT - SUBJECTIVE AND OBJECTIVE BOX
Medical Student Progress Note discussed with attending      INTERVAL HPI/OVERNIGHT EVENTS:       REVIEW OF SYSTEMS:  CONSTITUTIONAL: Denies fever, weight loss, or fatigue  RESPIRATORY: Denies cough, wheezing, chills or hemoptysis; Denies shortness of breath  CARDIOVASCULAR: Denies chest pain, palpitations, dizziness, or leg swelling  GASTROINTESTINAL: Denies abdominal pain. Denies nausea, vomiting, or hematemesis; Denies diarrhea or constipation. Denies melena or hematochezia.  GENITOURINARY: Denies dysuria or hematuria, urinary frequency  NEUROLOGICAL: Denies headaches, memory loss, loss of strength, numbness, or tremors  SKIN: Denies itching, burning, rashes, or lesions     MEDICATIONS  (STANDING):    MEDICATIONS  (PRN):  acetaminophen     Tablet .. 650 milliGRAM(s) Oral every 6 hours PRN Temp greater or equal to 38C (100.4F), Mild Pain (1 - 3)  aluminum hydroxide/magnesium hydroxide/simethicone Suspension 30 milliLiter(s) Oral every 4 hours PRN Dyspepsia  meclizine 25 milliGRAM(s) Oral every 24 hours PRN Dizziness  melatonin 3 milliGRAM(s) Oral at bedtime PRN Insomnia  ondansetron Injectable 4 milliGRAM(s) IV Push every 8 hours PRN Nausea and/or Vomiting      Vital Signs Last 24 Hrs  T(C): 36.5 (11 Jul 2024 05:22), Max: 37 (10 Jul 2024 20:44)  T(F): 97.7 (11 Jul 2024 05:22), Max: 98.6 (10 Jul 2024 20:44)  HR: 70 (11 Jul 2024 05:22) (69 - 104)  BP: 107/76 (11 Jul 2024 05:22) (98/76 - 140/90)  BP(mean): 82 (11 Jul 2024 01:18) (82 - 82)  RR: 17 (11 Jul 2024 05:22) (14 - 20)  SpO2: 96% (11 Jul 2024 05:22) (96% - 100%)    Parameters below as of 11 Jul 2024 05:22  Patient On (Oxygen Delivery Method): room air        PHYSICAL EXAMINATION:  GENERAL: NAD, well built  HEAD:  Atraumatic, Normocephalic  EYES:  conjunctiva and sclera clear  NECK: Supple, No JVD  CHEST/LUNG: Clear to auscultation. No rales, rhonchi, wheezing, or rubs  HEART: Regular rate and rhythm  ABDOMEN: Soft, Nontender, Nondistended; Bowel sounds present, no pain or masses on palpation  NERVOUS SYSTEM:  Alert & Oriented X3  PSYCH: appropriate affect  : voiding well  EXTREMITIES:  2+ Peripheral Pulses, No cyanosis, or edema  SKIN: warm dry                          13.5   5.58  )-----------( 308      ( 11 Jul 2024 07:00 )             40.0     07-11    140  |  108  |  18  ----------------------------<  98  3.6   |  24  |  0.71    Ca    8.9      11 Jul 2024 07:00    TPro  7.3  /  Alb  3.7  /  TBili  1.1  /  DBili  x   /  AST  15  /  ALT  24  /  AlkPhos  74  07-11    LIVER FUNCTIONS - ( 11 Jul 2024 07:00 )  Alb: 3.7 g/dL / Pro: 7.3 g/dL / ALK PHOS: 74 U/L / ALT: 24 U/L DA / AST: 15 U/L / GGT: x               PT/INR - ( 10 Jul 2024 13:55 )   PT: 11.7 sec;   INR: 1.03 ratio         PTT - ( 10 Jul 2024 13:55 )  PTT:36.9 sec    I&O's Summary          CAPILLARY BLOOD GLUCOSE      RADIOLOGY & ADDITIONAL TESTS:                   Medical Student Progress Note discussed with attending      INTERVAL HPI/OVERNIGHT EVENTS: Pt was admitted to in-patient yesterday. She was seen at bedside this morning, resting comfortably. She reported improvements in her R sided weakness and tingling, but still reporting residual tingling (worse LE > UE) and weakness. She first experienced these symptoms when she was admitted to the ED about 2 weeks ago. She denies usage of alcohol, tobacco, recreational drugs, and herbal supplements. She did report taking coffee mushroom on June 24th, when she first experienced dizziness, but was informed by the ED that it was not related to her symptoms. She has not taken it since then.     Pt stated that she has been experiencing night time palpitations and sweating since April 2024, but did not report palpitations last night. She also reports intermittent buzzing sound in her R ear lasting a few seconds, which also began in April. She reported going to [place] in April, but denies recent diarrheal illnesses. Pt also has occasional headaches (diffuse sharp pain, band-like, with photophobia, that can last for the whole day) which she takes Excedrin for. Her last HA was on June 24th. She reports lightheadedness when abruptly changing positions (sitting to standing).       REVIEW OF SYSTEMS:  CONSTITUTIONAL: Denies fever, weight loss, or fatigue  RESPIRATORY: Denies cough, wheezing, chills or hemoptysis; Denies shortness of breath  CARDIOVASCULAR: Denies chest pain, palpitations, dizziness, or leg swelling  GASTROINTESTINAL: Denies abdominal pain. Denies nausea, vomiting, or hematemesis; Denies diarrhea or constipation. Denies melena or hematochezia.  GENITOURINARY: Denies dysuria or hematuria, urinary frequency  NEUROLOGICAL: Denies memory loss and tremors, Reports occasional HAs  (diffuse sharp pain, band-like, with photophobia, that can last for the whole day), R sided weakness and paresthesia  SKIN: Denies itching, burning, rashes, or lesions     MEDICATIONS  (STANDING):    MEDICATIONS  (PRN):  acetaminophen     Tablet .. 650 milliGRAM(s) Oral every 6 hours PRN Temp greater or equal to 38C (100.4F), Mild Pain (1 - 3)  aluminum hydroxide/magnesium hydroxide/simethicone Suspension 30 milliLiter(s) Oral every 4 hours PRN Dyspepsia  meclizine 25 milliGRAM(s) Oral every 24 hours PRN Dizziness  melatonin 3 milliGRAM(s) Oral at bedtime PRN Insomnia  ondansetron Injectable 4 milliGRAM(s) IV Push every 8 hours PRN Nausea and/or Vomiting      Vital Signs Last 24 Hrs  T(C): 36.5 (11 Jul 2024 05:22), Max: 37 (10 Jul 2024 20:44)  T(F): 97.7 (11 Jul 2024 05:22), Max: 98.6 (10 Jul 2024 20:44)  HR: 70 (11 Jul 2024 05:22) (69 - 104)  BP: 107/76 (11 Jul 2024 05:22) (98/76 - 140/90)  BP(mean): 82 (11 Jul 2024 01:18) (82 - 82)  RR: 17 (11 Jul 2024 05:22) (14 - 20)  SpO2: 96% (11 Jul 2024 05:22) (96% - 100%)    Parameters below as of 11 Jul 2024 05:22  Patient On (Oxygen Delivery Method): room air        PHYSICAL EXAMINATION:  GENERAL: NAD, well built  HEAD:  Atraumatic, Normocephalic  EYES:  conjunctiva and sclera clear  NECK: Supple, No JVD  CHEST/LUNG: Clear to auscultation. No rales, rhonchi, wheezing, or rubs  HEART: Regular rate and rhythm  ABDOMEN: Soft, Nontender, Nondistended; Bowel sounds present, no pain or masses on palpation  NERVOUS SYSTEM:  Alert & Oriented X3  PSYCH: appropriate affect  : voiding well  EXTREMITIES:  2+ Peripheral Pulses, No cyanosis, or edema  MSK: 5/5 strength L upper and lower extremities, 4/5 strength R upper and lower extremities,  strength was slightly weak (4/5) on the right compared to the left (5/5)  SKIN: warm dry                          13.5   5.58  )-----------( 308      ( 11 Jul 2024 07:00 )             40.0     07-11    140  |  108  |  18  ----------------------------<  98  3.6   |  24  |  0.71    Ca    8.9      11 Jul 2024 07:00    TPro  7.3  /  Alb  3.7  /  TBili  1.1  /  DBili  x   /  AST  15  /  ALT  24  /  AlkPhos  74  07-11    LIVER FUNCTIONS - ( 11 Jul 2024 07:00 )  Alb: 3.7 g/dL / Pro: 7.3 g/dL / ALK PHOS: 74 U/L / ALT: 24 U/L DA / AST: 15 U/L / GGT: x               PT/INR - ( 10 Jul 2024 13:55 )   PT: 11.7 sec;   INR: 1.03 ratio         PTT - ( 10 Jul 2024 13:55 )  PTT:36.9 sec    I&O's Summary          CAPILLARY BLOOD GLUCOSE      RADIOLOGY & ADDITIONAL TESTS:

## 2024-07-12 ENCOUNTER — TRANSCRIPTION ENCOUNTER (OUTPATIENT)
Age: 44
End: 2024-07-12

## 2024-07-12 ENCOUNTER — RESULT REVIEW (OUTPATIENT)
Age: 44
End: 2024-07-12

## 2024-07-12 VITALS
HEART RATE: 68 BPM | DIASTOLIC BLOOD PRESSURE: 96 MMHG | SYSTOLIC BLOOD PRESSURE: 122 MMHG | TEMPERATURE: 98 F | OXYGEN SATURATION: 98 % | RESPIRATION RATE: 18 BRPM

## 2024-07-12 LAB
A1C WITH ESTIMATED AVERAGE GLUCOSE RESULT: 5.8 % — HIGH (ref 4–5.6)
ANION GAP SERPL CALC-SCNC: 7 MMOL/L — SIGNIFICANT CHANGE UP (ref 5–17)
BUN SERPL-MCNC: 19 MG/DL — HIGH (ref 7–18)
CALCIUM SERPL-MCNC: 8.7 MG/DL — SIGNIFICANT CHANGE UP (ref 8.4–10.5)
CHLORIDE SERPL-SCNC: 109 MMOL/L — HIGH (ref 96–108)
CHOLEST SERPL-MCNC: 162 MG/DL — SIGNIFICANT CHANGE UP
CO2 SERPL-SCNC: 24 MMOL/L — SIGNIFICANT CHANGE UP (ref 22–31)
CREAT SERPL-MCNC: 0.66 MG/DL — SIGNIFICANT CHANGE UP (ref 0.5–1.3)
EGFR: 111 ML/MIN/1.73M2 — SIGNIFICANT CHANGE UP
ESTIMATED AVERAGE GLUCOSE: 120 MG/DL — HIGH (ref 68–114)
GLUCOSE SERPL-MCNC: 103 MG/DL — HIGH (ref 70–99)
HCT VFR BLD CALC: 39 % — SIGNIFICANT CHANGE UP (ref 34.5–45)
HDLC SERPL-MCNC: 47 MG/DL — LOW
HGB BLD-MCNC: 13.1 G/DL — SIGNIFICANT CHANGE UP (ref 11.5–15.5)
LIPID PNL WITH DIRECT LDL SERPL: 103 MG/DL — HIGH
MAGNESIUM SERPL-MCNC: 2.3 MG/DL — SIGNIFICANT CHANGE UP (ref 1.6–2.6)
MCHC RBC-ENTMCNC: 30.9 PG — SIGNIFICANT CHANGE UP (ref 27–34)
MCHC RBC-ENTMCNC: 33.6 GM/DL — SIGNIFICANT CHANGE UP (ref 32–36)
MCV RBC AUTO: 92 FL — SIGNIFICANT CHANGE UP (ref 80–100)
NON HDL CHOLESTEROL: 115 MG/DL — SIGNIFICANT CHANGE UP
NRBC # BLD: 0 /100 WBCS — SIGNIFICANT CHANGE UP (ref 0–0)
PHOSPHATE SERPL-MCNC: 4.1 MG/DL — SIGNIFICANT CHANGE UP (ref 2.5–4.5)
PLATELET # BLD AUTO: 296 K/UL — SIGNIFICANT CHANGE UP (ref 150–400)
POTASSIUM SERPL-MCNC: 3.8 MMOL/L — SIGNIFICANT CHANGE UP (ref 3.5–5.3)
POTASSIUM SERPL-SCNC: 3.8 MMOL/L — SIGNIFICANT CHANGE UP (ref 3.5–5.3)
RBC # BLD: 4.24 M/UL — SIGNIFICANT CHANGE UP (ref 3.8–5.2)
RBC # FLD: 13.4 % — SIGNIFICANT CHANGE UP (ref 10.3–14.5)
SODIUM SERPL-SCNC: 140 MMOL/L — SIGNIFICANT CHANGE UP (ref 135–145)
TRIGL SERPL-MCNC: 60 MG/DL — SIGNIFICANT CHANGE UP
WBC # BLD: 6.37 K/UL — SIGNIFICANT CHANGE UP (ref 3.8–10.5)
WBC # FLD AUTO: 6.37 K/UL — SIGNIFICANT CHANGE UP (ref 3.8–10.5)

## 2024-07-12 PROCEDURE — 85025 COMPLETE CBC W/AUTO DIFF WBC: CPT

## 2024-07-12 PROCEDURE — 84443 ASSAY THYROID STIM HORMONE: CPT

## 2024-07-12 PROCEDURE — A9585: CPT

## 2024-07-12 PROCEDURE — 70498 CT ANGIOGRAPHY NECK: CPT | Mod: MC

## 2024-07-12 PROCEDURE — 99285 EMERGENCY DEPT VISIT HI MDM: CPT | Mod: 25

## 2024-07-12 PROCEDURE — 70496 CT ANGIOGRAPHY HEAD: CPT | Mod: MC

## 2024-07-12 PROCEDURE — 95957 EEG DIGITAL ANALYSIS: CPT

## 2024-07-12 PROCEDURE — 81025 URINE PREGNANCY TEST: CPT

## 2024-07-12 PROCEDURE — 82746 ASSAY OF FOLIC ACID SERUM: CPT

## 2024-07-12 PROCEDURE — 83735 ASSAY OF MAGNESIUM: CPT

## 2024-07-12 PROCEDURE — 99239 HOSP IP/OBS DSCHRG MGMT >30: CPT | Mod: GC

## 2024-07-12 PROCEDURE — 0042T: CPT | Mod: MC

## 2024-07-12 PROCEDURE — 83036 HEMOGLOBIN GLYCOSYLATED A1C: CPT

## 2024-07-12 PROCEDURE — 70450 CT HEAD/BRAIN W/O DYE: CPT | Mod: MC

## 2024-07-12 PROCEDURE — 80053 COMPREHEN METABOLIC PANEL: CPT

## 2024-07-12 PROCEDURE — 93005 ELECTROCARDIOGRAM TRACING: CPT

## 2024-07-12 PROCEDURE — 80048 BASIC METABOLIC PNL TOTAL CA: CPT

## 2024-07-12 PROCEDURE — 82962 GLUCOSE BLOOD TEST: CPT

## 2024-07-12 PROCEDURE — 84100 ASSAY OF PHOSPHORUS: CPT

## 2024-07-12 PROCEDURE — 85610 PROTHROMBIN TIME: CPT

## 2024-07-12 PROCEDURE — 85730 THROMBOPLASTIN TIME PARTIAL: CPT

## 2024-07-12 PROCEDURE — 85027 COMPLETE CBC AUTOMATED: CPT

## 2024-07-12 PROCEDURE — 95816 EEG AWAKE AND DROWSY: CPT

## 2024-07-12 PROCEDURE — 70552 MRI BRAIN STEM W/DYE: CPT | Mod: MC

## 2024-07-12 PROCEDURE — 36415 COLL VENOUS BLD VENIPUNCTURE: CPT

## 2024-07-12 PROCEDURE — 80061 LIPID PANEL: CPT

## 2024-07-12 PROCEDURE — 93306 TTE W/DOPPLER COMPLETE: CPT

## 2024-07-12 PROCEDURE — 82607 VITAMIN B-12: CPT

## 2024-07-12 PROCEDURE — 84484 ASSAY OF TROPONIN QUANT: CPT

## 2024-07-12 RX ORDER — ALBUTEROL 90 MCG
2 AEROSOL REFILL (GRAM) INHALATION EVERY 6 HOURS
Refills: 0 | Status: DISCONTINUED | OUTPATIENT
Start: 2024-07-12 | End: 2024-07-12

## 2024-07-12 RX ORDER — MECLIZINE HCL 25 MG
1 TABLET ORAL
Qty: 42 | Refills: 0
Start: 2024-07-12 | End: 2024-07-25

## 2024-07-12 RX ORDER — KETOROLAC TROMETHAMINE 30 MG/ML
30 INJECTION, SOLUTION INTRAMUSCULAR ONCE
Refills: 0 | Status: DISCONTINUED | OUTPATIENT
Start: 2024-07-12 | End: 2024-07-12

## 2024-07-12 RX ORDER — ALBUTEROL 90 MCG
2 AEROSOL REFILL (GRAM) INHALATION
Refills: 0 | DISCHARGE

## 2024-07-12 RX ORDER — CLOPIDOGREL BISULFATE 75 MG/1
1 TABLET, FILM COATED ORAL
Qty: 19 | Refills: 0
Start: 2024-07-12 | End: 2024-07-30

## 2024-07-12 RX ORDER — ATORVASTATIN CALCIUM 20 MG/1
1 TABLET, FILM COATED ORAL
Qty: 30 | Refills: 0
Start: 2024-07-12 | End: 2024-08-10

## 2024-07-12 RX ORDER — ASPIRIN 325 MG/1
1 TABLET, FILM COATED ORAL
Qty: 30 | Refills: 0
Start: 2024-07-12 | End: 2024-08-10

## 2024-07-12 RX ADMIN — KETOROLAC TROMETHAMINE 30 MILLIGRAM(S): 30 INJECTION, SOLUTION INTRAMUSCULAR at 15:08

## 2024-07-12 RX ADMIN — KETOROLAC TROMETHAMINE 30 MILLIGRAM(S): 30 INJECTION, SOLUTION INTRAMUSCULAR at 14:34

## 2024-07-12 RX ADMIN — Medication 650 MILLIGRAM(S): at 10:48

## 2024-07-12 RX ADMIN — Medication 650 MILLIGRAM(S): at 11:31

## 2024-07-12 NOTE — DISCHARGE NOTE PROVIDER - HOSPITAL COURSE
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  At the time she was unable to lift her right leg. Symptoms are associated with dizziness, nausea, and blurred vision.  She was in the emergency room about 2 weeks ago for similar dizziness and was discharged with meclizine as needed.  She endorses about 2 weeks of intermittent palpitations mostly at night with intermittent chest pressure.  She says she has had hot flashes since she was 38 years old.    She had 2 episodes of diarrhea on the morning of admission. She has right leg swelling that comes and goes.  She also says she has been sleeping more than usual recently.  On exam she does complain of a frontal headache with right-sided cheek pain that is not influenced by chewing.  She denies any photophobia.  She has a history of migraines most recently 5 years ago and does not usually get headaches.    CT head confirmed a 4.5mm supraclinoid aneurysm on the left projecting to the superior anterior. This is unlikely the cause of her symptoms.  On exam she does have mild right sided upper extremity and lower extremity weakness.  She does not have disturbances to sensation.    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 Neuro Dr. Vicente. CTA neg for actue CVA. MRI revealed  5 mm superiorly directed outpouching arising from the left supraclinoid ICA suggesting an aneurysm (16:60, 17:72), 5 mm medially directed outpouching arising from the right cavernous ICA suggesting an aneurysm (7:8). EEG neg for seizure. TTE benign. Started on ASA/plavix for 21 days out of abundance of precaution, pt safe to d/c and to f/u with o/p neuro and PMD    Given patient's improved clinical status and current hemodynamic stability, decision was made to discharge. Discussed with attending. Please refer to complete medical records for a full hospital course, as this is only a brief summary.

## 2024-07-12 NOTE — DISCHARGE NOTE NURSING/CASE MANAGEMENT/SOCIAL WORK - PATIENT PORTAL LINK FT
You can access the FollowMyHealth Patient Portal offered by [facility] by registering at the following website: [email or web address]. By joining [facility]’s FollowMyHealth portal, you will also be able to view your health information using other applications (apps) compatible with our system.

## 2024-07-12 NOTE — DISCHARGE NOTE PROVIDER - NSDCCPCAREPLAN_GEN_ALL_CORE_FT
PRINCIPAL DISCHARGE DIAGNOSIS  Diagnosis: Dizziness  Assessment and Plan of Treatment: You came to the hospital complaining of dizziness, R sided weakness. You were admitted and underwent stroke workup. CT angio showed good brain perfusion and no acute stroke. 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 EEG was negative for seizures. Echocardiogram of your heart was benign. You are being sent with aspirin 81 mg, and plavix 75 mg. Continue to take the baby aspirin until you see your outpatient neurologist or PMD, stop the plavix after 21 days total of taking it. LAst day should be July 31. You had slightly high cholesterol. Take atorvastatin 40 mg at night  Follow up outpatient with your primary care doctor and neurologist.      SECONDARY DISCHARGE DIAGNOSES  Diagnosis: Brain aneurysm  Assessment and Plan of Treatment: please see above    Diagnosis: Headache, unspecified  Assessment and Plan of Treatment: You told us you have headache, history of migraine in past. You had headache in hospital and you responded well to NSAID medication. Follow up outpatient with your primary care doctor.    Diagnosis: Transient ischemic attack (TIA)  Assessment and Plan of Treatment: There was a concern for TIA on your admission. Please see above.    Diagnosis: Hyperlipidemia  Assessment and Plan of Treatment: please see above     PRINCIPAL DISCHARGE DIAGNOSIS  Diagnosis: Dizziness  Assessment and Plan of Treatment: You came to the hospital complaining of dizziness, R sided weakness. You were admitted and underwent stroke workup. CT angio showed good brain perfusion and no acute stroke. 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 EEG was negative for seizures. Echocardiogram of your heart was benign. You are being sent with aspirin 81 mg, and plavix 75 mg. Continue to take the baby aspirin until you see your outpatient neurologist or PMD, stop the plavix after 21 days total of taking it. LAst day should be July 31. You had slightly high cholesterol. Take atorvastatin 40 mg at night.  You have dizziness, you can take meclizine 12.5 mg up to 3 times a day as needed for dizziness.  Follow up outpatient with your primary care doctor and neurologist.      SECONDARY DISCHARGE DIAGNOSES  Diagnosis: Brain aneurysm  Assessment and Plan of Treatment: please see above  Please see your neurologist or neurosurgeon for further management.    Diagnosis: Headache, unspecified  Assessment and Plan of Treatment: You told us you have headache, history of migraine in past. You had headache in hospital and you responded well to NSAID medication. Follow up outpatient with your primary care doctor.    Diagnosis: Transient ischemic attack (TIA)  Assessment and Plan of Treatment: There was a concern for TIA on your admission. Please see above.    Diagnosis: Hyperlipidemia  Assessment and Plan of Treatment: please see above     PRINCIPAL DISCHARGE DIAGNOSIS  Diagnosis: Transient ischemic attack (TIA)  Assessment and Plan of Treatment: You came to the hospital complaining of dizziness, R sided weakness. You were admitted and underwent stroke workup. CT angio showed good brain perfusion and no acute stroke. 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 EEG was negative for seizures. Echocardiogram of your heart was benign. You are being sent with aspirin 81 mg, and plavix 75 mg. Continue to take the baby aspirin until you see your outpatient neurologist or PMD, stop the plavix after 21 days total of taking it. LAst day should be July 31. You had slightly high cholesterol. Take atorvastatin 40 mg at night.  You have dizziness, you can take meclizine 12.5 mg up to 3 times a day as needed for dizziness.  Follow up outpatient with your primary care doctor and neurologist.      SECONDARY DISCHARGE DIAGNOSES  Diagnosis: Brain aneurysm  Assessment and Plan of Treatment: please see above  Please see your neurologist or neurosurgeon for further management.    Diagnosis: Headache, unspecified  Assessment and Plan of Treatment: You told us you have headache, history of migraine in past. You had headache in hospital and you responded well to NSAID medication. Follow up outpatient with your primary care doctor.    Diagnosis: Hyperlipidemia  Assessment and Plan of Treatment: please see above    Diagnosis: Transient ischemic attack (TIA)  Assessment and Plan of Treatment: You came to the hospital complaining of dizziness, R sided weakness. You were admitted and underwent stroke workup. CT angio showed good brain perfusion and no acute stroke. 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 EEG was negative for seizures. Echocardiogram of your heart was benign. You are being sent with aspirin 81 mg, and plavix 75 mg. Continue to take the baby aspirin until you see your outpatient neurologist or PMD, stop the plavix after 21 days total of taking it. LAst day should be July 31. You had slightly high cholesterol. Take atorvastatin 40 mg at night.  You have dizziness, you can take meclizine 12.5 mg up to 3 times a day as needed for dizziness.  Follow up outpatient with your primary care doctor and neurologist.

## 2024-07-12 NOTE — PROGRESS NOTE ADULT - SUBJECTIVE AND OBJECTIVE BOX
Medical Student Progress Note discussed with attending      INTERVAL HPI/OVERNIGHT EVENTS:       REVIEW OF SYSTEMS:  CONSTITUTIONAL: Denies fever, weight loss, or fatigue  RESPIRATORY: Denies cough, wheezing, chills or hemoptysis; Denies shortness of breath  CARDIOVASCULAR: Denies chest pain, palpitations, dizziness, or leg swelling  GASTROINTESTINAL: Denies abdominal pain. Denies nausea, vomiting, or hematemesis; Denies diarrhea or constipation. Denies melena or hematochezia.  GENITOURINARY: Denies dysuria or hematuria, urinary frequency  NEUROLOGICAL: Denies headaches, memory loss, loss of strength, numbness, or tremors  SKIN: Denies itching, burning, rashes, or lesions     MEDICATIONS  (STANDING):  aspirin  chewable 81 milliGRAM(s) Oral daily  atorvastatin 40 milliGRAM(s) Oral at bedtime  clopidogrel Tablet 75 milliGRAM(s) Oral daily    MEDICATIONS  (PRN):  acetaminophen     Tablet .. 650 milliGRAM(s) Oral every 6 hours PRN Temp greater or equal to 38C (100.4F), Mild Pain (1 - 3)  aluminum hydroxide/magnesium hydroxide/simethicone Suspension 30 milliLiter(s) Oral every 4 hours PRN Dyspepsia  meclizine 25 milliGRAM(s) Oral every 24 hours PRN Dizziness  melatonin 3 milliGRAM(s) Oral at bedtime PRN Insomnia  ondansetron Injectable 4 milliGRAM(s) IV Push every 8 hours PRN Nausea and/or Vomiting      Vital Signs Last 24 Hrs  T(C): 36.5 (12 Jul 2024 04:55), Max: 37.2 (11 Jul 2024 17:50)  T(F): 97.7 (12 Jul 2024 04:55), Max: 99 (11 Jul 2024 17:50)  HR: 66 (12 Jul 2024 04:55) (66 - 87)  BP: 102/69 (12 Jul 2024 04:55) (99/76 - 130/68)  BP(mean): 80 (12 Jul 2024 04:55) (54 - 83)  RR: 17 (12 Jul 2024 04:55) (17 - 18)  SpO2: 96% (12 Jul 2024 04:55) (96% - 99%)    Parameters below as of 12 Jul 2024 04:55  Patient On (Oxygen Delivery Method): room air        PHYSICAL EXAMINATION:  GENERAL: NAD, well built  HEAD:  Atraumatic, Normocephalic  EYES:  conjunctiva and sclera clear  NECK: Supple, No JVD  CHEST/LUNG: Clear to auscultation. No rales, rhonchi, wheezing, or rubs  HEART: Regular rate and rhythm  ABDOMEN: Soft, Nontender, Nondistended; Bowel sounds present, no pain or masses on palpation  NERVOUS SYSTEM:  Alert & Oriented X3  PSYCH: appropriate affect  : voiding well  EXTREMITIES:  2+ Peripheral Pulses, No cyanosis, or edema  SKIN: warm dry                          13.1   6.37  )-----------( 296      ( 12 Jul 2024 05:31 )             39.0     07-12    140  |  109<H>  |  19<H>  ----------------------------<  103<H>  3.8   |  24  |  0.66    Ca    8.7      12 Jul 2024 05:31  Phos  4.1     07-12  Mg     2.3     07-12    TPro  7.3  /  Alb  3.7  /  TBili  1.1  /  DBili  x   /  AST  15  /  ALT  24  /  AlkPhos  74  07-11    LIVER FUNCTIONS - ( 11 Jul 2024 07:00 )  Alb: 3.7 g/dL / Pro: 7.3 g/dL / ALK PHOS: 74 U/L / ALT: 24 U/L DA / AST: 15 U/L / GGT: x               PT/INR - ( 10 Jul 2024 13:55 )   PT: 11.7 sec;   INR: 1.03 ratio         PTT - ( 10 Jul 2024 13:55 )  PTT:36.9 sec    I&O's Summary          CAPILLARY BLOOD GLUCOSE      RADIOLOGY & ADDITIONAL TESTS:                   Medical Student Progress Note discussed with attending      INTERVAL HPI/OVERNIGHT EVENTS: No acute events overnight. Patient states feeling much better, with lingering tingling in right leg and foot.       REVIEW OF SYSTEMS:  CONSTITUTIONAL: Denies fever, weight loss, or fatigue  RESPIRATORY: Denies cough, wheezing, chills or hemoptysis; Denies shortness of breath  CARDIOVASCULAR: Denies chest pain, palpitations, dizziness, or leg swelling  GASTROINTESTINAL: Denies abdominal pain. Denies nausea, vomiting, or hematemesis; Denies diarrhea or constipation. Denies melena or hematochezia.  GENITOURINARY: Denies dysuria or hematuria, urinary frequency  NEUROLOGICAL: Denies memory loss, or tremors  SKIN: Denies itching, burning, rashes, or lesions     MEDICATIONS  (STANDING):  aspirin  chewable 81 milliGRAM(s) Oral daily  atorvastatin 40 milliGRAM(s) Oral at bedtime  clopidogrel Tablet 75 milliGRAM(s) Oral daily    MEDICATIONS  (PRN):  acetaminophen     Tablet .. 650 milliGRAM(s) Oral every 6 hours PRN Temp greater or equal to 38C (100.4F), Mild Pain (1 - 3)  aluminum hydroxide/magnesium hydroxide/simethicone Suspension 30 milliLiter(s) Oral every 4 hours PRN Dyspepsia  meclizine 25 milliGRAM(s) Oral every 24 hours PRN Dizziness  melatonin 3 milliGRAM(s) Oral at bedtime PRN Insomnia  ondansetron Injectable 4 milliGRAM(s) IV Push every 8 hours PRN Nausea and/or Vomiting      Vital Signs Last 24 Hrs  T(C): 36.5 (12 Jul 2024 04:55), Max: 37.2 (11 Jul 2024 17:50)  T(F): 97.7 (12 Jul 2024 04:55), Max: 99 (11 Jul 2024 17:50)  HR: 66 (12 Jul 2024 04:55) (66 - 87)  BP: 102/69 (12 Jul 2024 04:55) (99/76 - 130/68)  BP(mean): 80 (12 Jul 2024 04:55) (54 - 83)  RR: 17 (12 Jul 2024 04:55) (17 - 18)  SpO2: 96% (12 Jul 2024 04:55) (96% - 99%)    Parameters below as of 12 Jul 2024 04:55  Patient On (Oxygen Delivery Method): room air        PHYSICAL EXAMINATION:  GENERAL: NAD, well built  HEAD:  Atraumatic, Normocephalic  EYES:  conjunctiva and sclera clear  NECK: Supple, No JVD  CHEST/LUNG: Clear to auscultation. No rales, rhonchi, wheezing, or rubs  HEART: Regular rate and rhythm  ABDOMEN: Soft, Nontender, Nondistended; Bowel sounds present, no pain or masses on palpation  NERVOUS SYSTEM:  Alert & Oriented X3  PSYCH: appropriate affect  : voiding   EXTREMITIES:  2+ Peripheral Pulses, No cyanosis, or edema.  5/5 strength b/l extremities, sensation intact  SKIN: warm dry                          13.1   6.37  )-----------( 296      ( 12 Jul 2024 05:31 )             39.0     07-12    140  |  109<H>  |  19<H>  ----------------------------<  103<H>  3.8   |  24  |  0.66    Ca    8.7      12 Jul 2024 05:31  Phos  4.1     07-12  Mg     2.3     07-12    TPro  7.3  /  Alb  3.7  /  TBili  1.1  /  DBili  x   /  AST  15  /  ALT  24  /  AlkPhos  74  07-11    LIVER FUNCTIONS - ( 11 Jul 2024 07:00 )  Alb: 3.7 g/dL / Pro: 7.3 g/dL / ALK PHOS: 74 U/L / ALT: 24 U/L DA / AST: 15 U/L / GGT: x               PT/INR - ( 10 Jul 2024 13:55 )   PT: 11.7 sec;   INR: 1.03 ratio         PTT - ( 10 Jul 2024 13:55 )  PTT:36.9 sec    I&O's Summary          CAPILLARY BLOOD GLUCOSE      RADIOLOGY & ADDITIONAL TESTS:      < from: TTE W or WO Ultrasound Enhancing Agent (07.12.24 @ 10:33) >    TRANSTHORACIC ECHOCARDIOGRAM REPORT  ________________________________________________________________________________                                      _______       Pt. Name:       ROMY ROSE Study Date:    7/12/2024  MRN:            [id number]          YOB: 1980  Accession #:    [id number]         Age:           44 years  Account#:       [id number]        Gender:        F  Heart Rate:                       Height:        61.02 in (155.00 cm)  Rhythm:     Weight:        167.00 lb (75.75 kg)  Blood Pressure: 102/69 mmHg       BSA/BMI:       1.75 m² / 31.53 kg/m²  ________________________________________________________________________________________  Referring Physician:    [id number] Hetal Prado  Interpreting Physician: Jewel Darnell MD  Primary Sonographer:    Indiana Nelson RDCS    CPT:               ECHO TTE WO CON COMP W DOPP - 31468.m  Indication(s):     Cerebral infarction due to unspecified occlusion or stenosis                     ofunspecified cerebral artery - I63.50  Procedure:         Transthoracic echocardiogram with 2-D, M-mode and complete                     spectral and color flow Doppler.  Ordering Location: [facility]  Admission Status:  Inpatient  Agitated Saline:   Injection with agitated saline was performed to evaluate for                     intracardiac shunting.    _______________________________________________________________________________________     CONCLUSIONS:      1. Left ventricular systolic function isnormal with an ejection fraction of 67 % by Alicia's method of disks.   2. There is normal LV mass and normal geometry.   3. Normal left ventricular diastolic function.   4. Normal right ventricular cavity size, with normal wall thickness, and normal right ventricular systolic function.   5. Agitated saline injection was negative for intracardiac shunt.    ________________________________________________________________________________________  FINDINGS:     Left Ventricle:  Left ventricular systolic function is normal with a calculated ejection fraction of 67 % by the Alicia's biplane method of disks. There is normal left ventricular diastolic function. There is normal LV mass and normal geometry.     Right Ventricle:  The right ventricular cavity is normal in size, with normal wall thickness and right ventricular systolic function is normal.     Left Atrium:  The left atrium is normal in size with an indexed volume of 17.89 ml/m².     Right Atrium:  The right atrium is normal in size.     Interatrial Septum:  Agitated saline injection was negative for intracardiac shunt.     Aortic Valve:  The aortic valve is tricuspid with normal leaflet excursion.     Mitral Valve:  There is mild mitral regurgitation.     Tricuspid Valve:  Thereis trace tricuspid regurgitation. There is insufficient tricuspid regurgitation detected to calculate pulmonary artery systolic pressure.     Pulmonic Valve:  There is mild pulmonic regurgitation.     Aorta:  The aortic root appears normal in size.    Pericardium:  No pericardial effusion seen.     Systemic Veins:  The inferior vena cava is normal in size (normal <2.1cm) with normal inspiratory collapse (normal >50%) consistent with normal right atrial pressure (~3, range 0-5mmHg).  ____________________________________________________________________  QUANTITATIVE DATA:  Left Ventricle Measurements: (Indexed to BSA)     IVSd (2D):   0.8 cm  LVPWd (2D):  0.9 cm  LVIDd (2D):  4.6 cm  LVIDs (2D):  2.9 cm  LV Mass:     130 g  74.3 g/m²  LV Vol d, MOD A2C: 44.6 ml 25.50 ml/m²  LV Vol d, MOD A4C: 64.4 ml 36.82 ml/m²  LV Vol d, MOD BP:  56.0 ml 32.01 ml/m²  LV Vol s, MOD A2C: 15.0 ml 8.60 ml/m²  LV Vol s, MOD A4C: 22.8 ml 13.05 ml/m²  LV Vol s, MOD BP:  18.5 ml 10.60 ml/m²  LVOT SV MOD BP:    37.5 ml  LV EF% MOD BP:     67 %     MV E Vmax: 0.85 m/s  MV A Vmax: 0.62 m/s  MV E/A:    1.37  MV DT:     180 msec    Aorta Measurements: (Normal range) (Indexed to BSA)     Ao Root 3.0 cm       Left Atrium Measurements: (Indexed to BSA)  LA Diam 2D: 3.52 cm       LVOT / RVOT/ Qp/Qs Data: (Indexed to BSA)  LVOT Diameter:  1.88 cm  LVOT Area:      2.78 cm²  LVOT Vmax:      1.10 m/s  LVOT VTI:       26.86 cm  LVOT peak grad: 5 mmHg  LVOT mean grad: 2.4 mmHg  LVOT SV:        74.7 ml  42.71 ml/m²    Aortic Valve Measurements:  AV Vmax:                1.3 m/s  AV Peak Gradient:       6.5 mmHg  AV Mean Gradient:       3.3 mmHg  AV VTI:                 29.0 cm  AV VTI Ratio:           0.93  AoV EOA, Contin:        2.58 cm²  AoV EOA, Contin i:      1.47 cm²/m²  AoV Dimensionless Index 0.93    Mitral Valve Measurements:     MV E Vmax: 0.8 m/s  MV A Vmax: 0.6 m/s  MV E/A:    1.4       Tricuspid Valve Measurements:     RA Pressure: 3 mmHg    ________________________________________________________________________________________  Electronically signed on 7/12/2024 at 12:41:59 PM by Jewel Darnell MD         *** Final ***    < end of copied text >

## 2024-07-12 NOTE — PROGRESS NOTE ADULT - PROBLEM SELECTOR PLAN 1
Concern for TIA versus seizure versus Trent's paralysis versus demyelinating disease, per neuro  Right-sided weakness  Admit to telemetry for monitoring  CT head and neck negative for CVA  B12, folate, TSH WNL, Tpn WNL  07/11 MRI: no acute infarct, hemorrhage or mass effect, no evidence of demyelinating lesion, 5mm outpouching rising from L supraclinoid ICA and from R cavernous ICA suggesting aneurysm     Follow-up EEG  Neuro checks Q4  Neuro Dr. Vicente consulted:  started DAPT, Atorvastatin 40  f/u Hgb A1c, fasting lipid, PT eval   meclizine prn Resolved    Concern for TIA versus seizure versus Trent's paralysis versus demyelinating disease, per neuro  Right-sided weakness  Admit to telemetry for monitoring  CT head and neck negative for CVA  B12, folate, TSH WNL, Tpn WNL  07/11 MRI: no acute infarct, hemorrhage or mass effect, no evidence of demyelinating lesion, 5mm outpouching rising from L supraclinoid ICA and from R cavernous ICA suggesting aneurysm     EEG Normal  Neuro checks Q4  Neuro Dr. Vicente consulted:  started DAPT, Atorvastatin 40  Hgb A1c 5.8, fasting lipid: HDL 41, . TG and Chol wnl, Pt eval   meclizine prn

## 2024-07-12 NOTE — DISCHARGE NOTE PROVIDER - PREFACE STATEMENT FOR MINUTES SPENT
I personally spent

## 2024-07-12 NOTE — PROGRESS NOTE ADULT - PROBLEM SELECTOR PLAN 4
not needed at this time     IMPROVE VTE Individual Risk Assessment    RISK                                                                Points  [  ] Previous VTE                                                  3  [  ] Thrombophilia                                               2  [  ] Lower limb paralysis                                      2        (unable to hold up >15 seconds)    [  ] Current Cancer                                              2         (within 6 months)  [  ] Immobilization > 24 hrs                                1  [  ] ICU/CCU stay > 24 hours                              1  [  ] Age > 60                                                      1  IMPROVE VTE Score ____0_____
not needed at this time     IMPROVE VTE Individual Risk Assessment    RISK                                                                Points  [  ] Previous VTE                                                  3  [  ] Thrombophilia                                               2  [  ] Lower limb paralysis                                      2        (unable to hold up >15 seconds)    [  ] Current Cancer                                              2         (within 6 months)  [  ] Immobilization > 24 hrs                                1  [  ] ICU/CCU stay > 24 hours                              1  [  ] Age > 60                                                      1  IMPROVE VTE Score ____0_____

## 2024-07-12 NOTE — DISCHARGE NOTE PROVIDER - CARE PROVIDER_API CALL
Rojelio Christine  Internal Medicine  [address] [place] [address]  Phone: [phone]  Fax: [phone]  Follow Up Time:

## 2024-07-12 NOTE — DISCHARGE NOTE NURSING/CASE MANAGEMENT/SOCIAL WORK - NSDCPEFALRISK_GEN_ALL_CORE
For information on Fall & Injury Prevention, visit: [email or web address] OR  [email or web address] OR  https://www.cdc.gov/steadi/patient.html

## 2024-07-12 NOTE — DISCHARGE NOTE PROVIDER - ATTENDING DISCHARGE PHYSICAL EXAMINATION:
CONSTITUTIONAL: Well appearing, well nourished, awake, alert and in no apparent distress  ENMT: Airway patent, Nasal mucosa clear. Mouth with normal mucosa. Throat has no vesicles, no oropharyngeal exudates and uvula is midline.  EYES: Clear bilaterally, pupils equal, round and reactive to light. EOMI.  CARDIAC: Normal rate, regular rhythm.  Heart sounds S1, S2.  No murmurs, rubs or gallops   RESPIRATORY: Breath sounds clear and equal bilaterally. No wheezes, rhales or rhonchi  MUSCULOSKELETAL: Spine appears normal, range of motion is not limited, no muscle or joint tenderness  EXTREMITIES: No edema, cyanosis or deformity   NEUROLOGICAL: Alert and oriented, no focal deficits, no motor or sensory deficits.  SKIN: No rash, skin turgor

## 2024-07-12 NOTE — DISCHARGE NOTE PROVIDER - NSDCMRMEDTOKEN_GEN_ALL_CORE_FT
Albuterol (Eqv-ProAir HFA) 90 mcg/inh inhalation aerosol: 2 puff(s) inhaled every 6 hours as needed for  shortness of breath and/or wheezing  aspirin 81 mg oral tablet, chewable: 1 tab(s) orally once a day  atorvastatin 40 mg oral tablet: 1 tab(s) orally once a day (at bedtime)  clopidogrel 75 mg oral tablet: 1 tab(s) orally once a day  meclizine 12.5 mg oral tablet: 1 tab(s) orally 3 times a day as needed for  dizziness

## 2024-07-12 NOTE — PROGRESS NOTE ADULT - ASSESSMENT
44-year-old woman coming with dizziness and right-sided weakness admitted for TIA workup.  Also has symptoms concerning for thyroid disease.
  44-year-old woman coming with dizziness and right-sided weakness admitted for TIA workup.  Also has symptoms concerning for thyroid disease.

## 2024-07-12 NOTE — PROGRESS NOTE ADULT - PROBLEM SELECTOR PLAN 3
Reports palpitations that are worse at night starting April 2024  Intermittent chest pressure  Hot flashes since age 38  intermittent nausea and dizziness  intermittent Headache (diffuse sharp pain, band-like, with photophobia, that can last for the whole day), takes Excedrin   TSH WNL     On tele for monitoring, Echo ordered Resolved    Reports palpitations that are worse at night starting April 2024  Intermittent chest pressure  Hot flashes since age 38  intermittent nausea and dizziness  intermittent Headache (diffuse sharp pain, band-like, with photophobia, that can last for the whole day), takes Excedrin   TSH WNL     On tele for monitoring, Echo wnl

## 2024-07-12 NOTE — PROGRESS NOTE ADULT - PROBLEM SELECTOR PLAN 2
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

## 2024-07-12 NOTE — DISCHARGE NOTE PROVIDER - NSDCFUSCHEDAPPT_GEN_ALL_CORE_FT
[facility]  NEUROSURG [id number]  Scheduled Appointment: 07/25/2024

## 2024-07-23 PROBLEM — I99.9 VASCULAR ABNORMALITY: Status: ACTIVE | Noted: 2024-07-23

## 2024-07-23 PROBLEM — Z78.9 SOCIAL ALCOHOL USE: Status: ACTIVE | Noted: 2024-07-23

## 2024-07-23 PROBLEM — I72.0 CAROTID ARTERY ANEURYSM: Status: ACTIVE | Noted: 2024-07-23

## 2024-07-23 PROBLEM — R93.89 IMAGING ABNORMALITY: Status: ACTIVE | Noted: 2024-07-23

## 2024-07-23 PROBLEM — J45.909 ASTHMA: Status: RESOLVED | Noted: 2024-07-23 | Resolved: 2024-07-23

## 2024-07-23 RX ORDER — CLOPIDOGREL 75 MG/1
75 TABLET, FILM COATED ORAL
Refills: 0 | Status: ACTIVE | COMMUNITY

## 2024-07-23 RX ORDER — ATORVASTATIN CALCIUM 40 MG/1
40 TABLET, FILM COATED ORAL
Refills: 0 | Status: ACTIVE | COMMUNITY

## 2024-07-23 RX ORDER — ALBUTEROL SULFATE 90 UG/1
INHALANT RESPIRATORY (INHALATION)
Refills: 0 | Status: ACTIVE | COMMUNITY

## 2024-07-25 ENCOUNTER — APPOINTMENT (OUTPATIENT)
Dept: NEUROSURGERY | Facility: CLINIC | Age: 44
End: 2024-07-25
Payer: COMMERCIAL

## 2024-07-25 VITALS
HEIGHT: 61 IN | RESPIRATION RATE: 18 BRPM | DIASTOLIC BLOOD PRESSURE: 78 MMHG | WEIGHT: 168 LBS | BODY MASS INDEX: 31.72 KG/M2 | OXYGEN SATURATION: 97 % | HEART RATE: 83 BPM | SYSTOLIC BLOOD PRESSURE: 115 MMHG

## 2024-07-25 DIAGNOSIS — Z87.39 PERSONAL HISTORY OF OTHER DISEASES OF THE MUSCULOSKELETAL SYSTEM AND CONNECTIVE TISSUE: ICD-10-CM

## 2024-07-25 DIAGNOSIS — M25.559 PAIN IN UNSPECIFIED HIP: ICD-10-CM

## 2024-07-25 DIAGNOSIS — I99.9 UNSPECIFIED DISORDER OF CIRCULATORY SYSTEM: ICD-10-CM

## 2024-07-25 DIAGNOSIS — R93.89 ABNORMAL FINDINGS ON DIAGNOSTIC IMAGING OF OTHER SPECIFIED BODY STRUCTURES: ICD-10-CM

## 2024-07-25 DIAGNOSIS — I72.0 ANEURYSM OF CAROTID ARTERY: ICD-10-CM

## 2024-07-25 DIAGNOSIS — Z78.9 OTHER SPECIFIED HEALTH STATUS: ICD-10-CM

## 2024-07-25 DIAGNOSIS — J45.909 UNSPECIFIED ASTHMA, UNCOMPLICATED: ICD-10-CM

## 2024-07-25 LAB
PA ADP PRP-ACNC: 96 PRU
PLATELET RESPONSE ASPIRIN: 388 ARU

## 2024-07-25 PROCEDURE — 99203 OFFICE O/P NEW LOW 30 MIN: CPT

## 2024-07-25 RX ORDER — MECLIZINE HYDROCHLORIDE 25 MG/1
TABLET ORAL
Refills: 0 | Status: ACTIVE | COMMUNITY

## 2024-07-25 NOTE — ADDENDUM
[FreeTextEntry1] :   Patient Name: ROMY ROSE   Chief Complaint (CC):   - Patient came for consultation regarding recurrence of numbness sensation and recent diagnosis of aneurysms.   History of Present Illness:   - Patient, a resident of [place] originally from [place], visited [place] a year ago due to numbness, without experiencing any symptoms until recently. No evidence of other medical issues such as asthma. In previous visits on June 20th and July 10th, the patient was worked up for a stroke, but none were identified. Instead, two aneurysms were discovered. Though initial suspicion was the patient experienced a Transient Ischemic Attack, the evidence now presents otherwise.   Past Medical History (PMH):   - The patient reported a history of migraines a long time ago. She has had no surgeries and not known to have a history of high blood pressure or any other known chronic conditions.   Family History (FH):   - No family history data was provided during the conversation.   Social History (SH):   - Patient lives in [place], originally from [place]. She has tried smoking once when she was 18 but did not continue. No information was provided about her alcohol or drug use. She is a mother of two children aged 23 and 10.   Medications:   - Current medications include aspirin, Clopidogrel, a statin and Albuterol. The latter is taken regularly, while the other two are new prescriptions started on June 11th.   Allergies:   - No allergies were reported during the conversation.   Review of Systems (ROS):   - No specific system review data was provided during the conversation.   Physical Examination (PE):   - No physical examination was conducted during the conversation.   Laboratory/Data Review:   - Upon review of previous scans, two aneurysms were identified. 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 The other is a dysplastic aneurysm, with its classification as an aneurysm currently uncertain.   Assessment:   - The patient's left-sided numbness likely isn't related to the left-sided aneurysm due to the brain's established organization. The first course of action is to conduct an angiogram for better understanding. However, the considerable size and potential risk associated with the left internal carotid artery aneurysm indicate it probably requires treatment.   Plan:   - The initial recommendation is to perform an angiogram in the coming weeks for better assessment of both aneurysms. Post-procedure, I will review the results, explain the findings and discuss possible treatment options with the patient before making a conclusive suggestion. Should the left internal carotid artery aneurysm require intervention, a catheter-based treatment using a flow diverter may be proposed. As such, the patient will continue her current regimen of aspirin and clopidogrel potentially for the next six months.   Preventive Health:   - No preventive measures or updates were discussed during the conversation.   Patient Name: ROMY ROSE   Chief Complaint (CC):   - Undergoing examination for presence of aneurysms.   History of Present Illness:   - Patient undergoing examination with a focus on aneurysms. The patient's 3D reconstructed head scans were visited, highlighting the skull, clavicle, blood vessels such as the carotid artery, middle cerebral and anterior cerebral arteries. Notably, an aneurysm was identified near the ophthalmic position, sized around five millimeters. The patient also has another potential aneurysm in the right side of her brain. Potential treatment options for the aneurysms were discussed including open surgery, catheter utilization, and coiling. The type of treatment will depend on the neck of the aneurysm, which will be further determined based on the patient's angiogram. The patient is reporting dizziness, but it was determined with the patient that this is likely unrelated to the aneurysm and could be attributed to various factors like weather, rest, or blood pressure.   Past Medical History (PMH):   - Not specifically mentioned during the conversation.   Family History (FH):   - Not specifically mentioned during the conversation.   Social History (SH):   - Not specifically mentioned during the conversation.   Medications:   - Patient may need to be on aspirin and Plavix for six months depending on chosen treatment plan for the aneurysm.   Allergies:   - No known allergies mentioned during the conversation.   Review of Systems (ROS):   - Not specifically mentioned during the conversation.   Physical Examination (PE):   - A 3D reconstruction of patient's head was used for examination. An aneurysm was identified in what's called the ophthalmic position of the patient's brain and another on the right side.   Laboratory/Data Review:   - Currently awaiting results from the angiogram for further evaluation and definitive treatment planning.   Assessment:   - The patient has an aneurysm near the ophthalmic position and shows signs of another potential aneurysm in the right side of the brain. The dizziness symptom was assessed as unrelated to the aneurysms.   Plan:   - The treatment plan will be established after the results of an angiogram. This could include open surgery, catheter utilization, or coiling depending on the neck of the aneurysm. The patient will also need to make an appointment with Dr. Tona Marcos, a neurologist, for the symptoms the patient reported to treat and manage them parallelly.   Preventive Health:   - Not specifically mentioned during the conversation.

## 2024-07-25 NOTE — ADDENDUM
[FreeTextEntry1] :   Patient Name: ROMY ROSE   Chief Complaint (CC):   - Patient came for consultation regarding recurrence of numbness sensation and recent diagnosis of aneurysms.   History of Present Illness:   - Patient, a resident of [place] originally from [place], visited [place] a year ago due to numbness, without experiencing any symptoms until recently. No evidence of other medical issues such as asthma. In previous visits on June 20th and July 10th, the patient was worked up for a stroke, but none were identified. Instead, two aneurysms were discovered. Though initial suspicion was the patient experienced a Transient Ischemic Attack, the evidence now presents otherwise.   Past Medical History (PMH):   - The patient reported a history of migraines a long time ago. She has had no surgeries and not known to have a history of high blood pressure or any other known chronic conditions.   Family History (FH):   - No family history data was provided during the conversation.   Social History (SH):   - Patient lives in [place], originally from [place]. She has tried smoking once when she was 18 but did not continue. No information was provided about her alcohol or drug use. She is a mother of two children aged 23 and 10.   Medications:   - Current medications include aspirin, Clopidogrel, a statin and Albuterol. The latter is taken regularly, while the other two are new prescriptions started on June 11th.   Allergies:   - No allergies were reported during the conversation.   Review of Systems (ROS):   - No specific system review data was provided during the conversation.   Physical Examination (PE):   - No physical examination was conducted during the conversation.   Laboratory/Data Review:   - Upon review of previous scans, two aneurysms were identified. 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 The other is a dysplastic aneurysm, with its classification as an aneurysm currently uncertain.   Assessment:   - The patient's left-sided numbness likely isn't related to the left-sided aneurysm due to the brain's established organization. The first course of action is to conduct an angiogram for better understanding. However, the considerable size and potential risk associated with the left internal carotid artery aneurysm indicate it probably requires treatment.   Plan:   - The initial recommendation is to perform an angiogram in the coming weeks for better assessment of both aneurysms. Post-procedure, I will review the results, explain the findings and discuss possible treatment options with the patient before making a conclusive suggestion. Should the left internal carotid artery aneurysm require intervention, a catheter-based treatment using a flow diverter may be proposed. As such, the patient will continue her current regimen of aspirin and clopidogrel potentially for the next six months.   Preventive Health:   - No preventive measures or updates were discussed during the conversation.   Patient Name: ROMY ROSE   Chief Complaint (CC):   - Undergoing examination for presence of aneurysms.   History of Present Illness:   - Patient undergoing examination with a focus on aneurysms. The patient's 3D reconstructed head scans were visited, highlighting the skull, clavicle, blood vessels such as the carotid artery, middle cerebral and anterior cerebral arteries. Notably, an aneurysm was identified near the ophthalmic position, sized around five millimeters. The patient also has another potential aneurysm in the right side of her brain. Potential treatment options for the aneurysms were discussed including open surgery, catheter utilization, and coiling. The type of treatment will depend on the neck of the aneurysm, which will be further determined based on the patient's angiogram. The patient is reporting dizziness, but it was determined with the patient that this is likely unrelated to the aneurysm and could be attributed to various factors like weather, rest, or blood pressure.   Past Medical History (PMH):   - Not specifically mentioned during the conversation.   Family History (FH):   - Not specifically mentioned during the conversation.   Social History (SH):   - Not specifically mentioned during the conversation.   Medications:   - Patient may need to be on aspirin and Plavix for six months depending on chosen treatment plan for the aneurysm.   Allergies:   - No known allergies mentioned during the conversation.   Review of Systems (ROS):   - Not specifically mentioned during the conversation.   Physical Examination (PE):   - A 3D reconstruction of patient's head was used for examination. An aneurysm was identified in what's called the ophthalmic position of the patient's brain and another on the right side.   Laboratory/Data Review:   - Currently awaiting results from the angiogram for further evaluation and definitive treatment planning.   Assessment:   - The patient has an aneurysm near the ophthalmic position and shows signs of another potential aneurysm in the right side of the brain. The dizziness symptom was assessed as unrelated to the aneurysms.   Plan:   - The treatment plan will be established after the results of an angiogram. This could include open surgery, catheter utilization, or coiling depending on the neck of the aneurysm. The patient will also need to make an appointment with Dr. Tona Marcos, a neurologist, for the symptoms the patient reported to treat and manage them parallelly.   Preventive Health:   - Not specifically mentioned during the conversation.

## 2024-07-25 NOTE — ASSESSMENT
[FreeTextEntry1] : I, Dr. Espinosa, personally performed the evaluation and management (E/M) services for this new patient who presents today with intermittent changes in MS and imaging. That E/M includes conducting the examination, assessing all new/exacerbated conditions, and establishing a new plan of care. Today, my ACP, Emeli Barnett, was here to observe my evaluation and management services for this new problem/exacerbated condition to be followed going forward.  PLAN: - Diagnostic cerebral angiogram - discussed endovascular treatment for right aneurysm using stent and coil - PST provided and reviewed with the pt and  - refer to Tona Marcos for her symptoms of dizziness and numbness - Check p2y12

## 2024-07-25 NOTE — PHYSICAL EXAM
[General Appearance - Alert] : alert [General Appearance - In No Acute Distress] : in no acute distress [Oriented To Time, Place, And Person] : oriented to person, place, and time [Impaired Insight] : insight and judgment were intact [Affect] : the affect was normal [Person] : oriented to person [Place] : oriented to place [Time] : oriented to time [Short Term Intact] : short term memory intact [Remote Intact] : remote memory intact [Span Intact] : the attention span was normal [Concentration Intact] : normal concentrating ability [Fluency] : fluency intact [Comprehension] : comprehension intact [Current Events] : adequate knowledge of current events [Past History] : adequate knowledge of personal past history [Vocabulary] : adequate range of vocabulary [Cranial Nerves Optic (II)] : visual acuity intact bilaterally,  pupils equal round and reactive to light [Cranial Nerves Oculomotor (III)] : extraocular motion intact [Cranial Nerves Trigeminal (V)] : facial sensation intact symmetrically [Cranial Nerves Facial (VII)] : face symmetrical [Cranial Nerves Vestibulocochlear (VIII)] : hearing was intact bilaterally [Cranial Nerves Glossopharyngeal (IX)] : tongue and palate midline [Cranial Nerves Accessory (XI - Cranial And Spinal)] : head turning and shoulder shrug symmetric [Cranial Nerves Hypoglossal (XII)] : there was no tongue deviation with protrusion [Motor Tone] : muscle tone was normal in all four extremities [Motor Strength] : muscle strength was normal in all four extremities [No Muscle Atrophy] : normal bulk in all four extremities [Motor Handedness Right-Handed] : the patient is right hand dominant [5] : S1 toe walking 5/5 [Sensation Tactile Decrease] : light touch was intact [Abnormal Walk] : normal gait [Balance] : balance was intact [PERRL With Normal Accommodation] : pupils were equal in size, round, reactive to light, with normal accommodation [Extraocular Movements] : extraocular movements were intact [Full Visual Field] : full visual field [Outer Ear] : the ears and nose were normal in appearance [Neck Appearance] : the appearance of the neck was normal [Past-pointing] : there was no past-pointing [Tremor] : no tremor present

## 2024-07-25 NOTE — REASON FOR VISIT
[New Patient Visit] : a new patient visit [Referred By: _________] : Patient was referred by KAREN [FreeTextEntry1] : Carotid abnormality

## 2024-07-25 NOTE — HISTORY OF PRESENT ILLNESS
[> 3 months] : more  than 3 months [de-identified] : The pt is a 44 year old right handed female who states one year ago she had her first event where her right leg and arm began tingling, her right face became numb and she felt dizzy. She did not seek out medical care. On June 28th and July 10 of this year the similar symptoms happened. She went to the ED in [facility] both times. 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 She was placed on ASA, Plavix for 21 days ( last dose 7/30/24) and Atorvastin 40mg. In the hospital EEG neg for Sz and ECHO WNL.   TODAY: 7/25/24. reports frequent, daily periods of numbness and tingling to her entire right side of her body.  PCP: Dr. Rojelio Christine PCP  [address] [facility] [address] [phone]

## 2024-07-25 NOTE — HISTORY OF PRESENT ILLNESS
[> 3 months] : more  than 3 months [de-identified] : The pt is a 44 year old right handed female who states one year ago she had her first event where her right leg and arm began tingling, her right face became numb and she felt dizzy. She did not seek out medical care. On June 28th and July 10 of this year the similar symptoms happened. She went to the ED in [facility] both times. 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 She was placed on ASA, Plavix for 21 days ( last dose 7/30/24) and Atorvastin 40mg. In the hospital EEG neg for Sz and ECHO WNL.   TODAY: 7/25/24. reports frequent, daily periods of numbness and tingling to her entire right side of her body.  PCP: Dr. Rojelio Christine PCP  [address] [facility] [address] [phone]

## 2024-07-25 NOTE — PHYSICAL EXAM
[General Appearance - Alert] : alert [Oriented To Time, Place, And Person] : oriented to person, place, and time [General Appearance - In No Acute Distress] : in no acute distress [Impaired Insight] : insight and judgment were intact [Affect] : the affect was normal [Person] : oriented to person [Place] : oriented to place [Time] : oriented to time [Short Term Intact] : short term memory intact [Remote Intact] : remote memory intact [Concentration Intact] : normal concentrating ability [Span Intact] : the attention span was normal [Fluency] : fluency intact [Comprehension] : comprehension intact [Current Events] : adequate knowledge of current events [Past History] : adequate knowledge of personal past history [Vocabulary] : adequate range of vocabulary [Cranial Nerves Optic (II)] : visual acuity intact bilaterally,  pupils equal round and reactive to light [Cranial Nerves Oculomotor (III)] : extraocular motion intact [Cranial Nerves Trigeminal (V)] : facial sensation intact symmetrically [Cranial Nerves Facial (VII)] : face symmetrical [Cranial Nerves Vestibulocochlear (VIII)] : hearing was intact bilaterally [Cranial Nerves Accessory (XI - Cranial And Spinal)] : head turning and shoulder shrug symmetric [Cranial Nerves Glossopharyngeal (IX)] : tongue and palate midline [Cranial Nerves Hypoglossal (XII)] : there was no tongue deviation with protrusion [Motor Tone] : muscle tone was normal in all four extremities [Motor Strength] : muscle strength was normal in all four extremities [No Muscle Atrophy] : normal bulk in all four extremities [Motor Handedness Right-Handed] : the patient is right hand dominant [5] : S1 toe walking 5/5 [Sensation Tactile Decrease] : light touch was intact [Abnormal Walk] : normal gait [Balance] : balance was intact [PERRL With Normal Accommodation] : pupils were equal in size, round, reactive to light, with normal accommodation [Extraocular Movements] : extraocular movements were intact [Full Visual Field] : full visual field [Outer Ear] : the ears and nose were normal in appearance [Neck Appearance] : the appearance of the neck was normal [Past-pointing] : there was no past-pointing [Tremor] : no tremor present

## 2024-08-01 RX ORDER — CLOPIDOGREL BISULFATE 75 MG/1
75 TABLET, FILM COATED ORAL
Qty: 30 | Refills: 6 | Status: ACTIVE | COMMUNITY
Start: 2024-08-01 | End: 1900-01-01

## 2024-08-01 RX ORDER — ASPIRIN 81 MG/1
81 TABLET, CHEWABLE ORAL DAILY
Qty: 30 | Refills: 6 | Status: ACTIVE | COMMUNITY
Start: 2024-08-01 | End: 1900-01-01

## 2024-08-01 RX ORDER — ATORVASTATIN CALCIUM 40 MG/1
40 TABLET, FILM COATED ORAL
Qty: 30 | Refills: 5 | Status: ACTIVE | COMMUNITY
Start: 2024-08-01 | End: 1900-01-01

## 2024-08-12 ENCOUNTER — NON-APPOINTMENT (OUTPATIENT)
Age: 44
End: 2024-08-12

## 2024-08-26 ENCOUNTER — APPOINTMENT (OUTPATIENT)
Dept: INTERNAL MEDICINE | Facility: CLINIC | Age: 44
End: 2024-08-26
Payer: COMMERCIAL

## 2024-08-26 VITALS
HEART RATE: 73 BPM | WEIGHT: 161 LBS | HEIGHT: 61 IN | SYSTOLIC BLOOD PRESSURE: 108 MMHG | RESPIRATION RATE: 16 BRPM | BODY MASS INDEX: 30.4 KG/M2 | DIASTOLIC BLOOD PRESSURE: 69 MMHG | TEMPERATURE: 97.7 F | OXYGEN SATURATION: 99 %

## 2024-08-26 DIAGNOSIS — F41.9 ANXIETY DISORDER, UNSPECIFIED: ICD-10-CM

## 2024-08-26 DIAGNOSIS — R79.1 ABNORMAL COAGULATION PROFILE: ICD-10-CM

## 2024-08-26 DIAGNOSIS — Z00.00 ENCOUNTER FOR GENERAL ADULT MEDICAL EXAMINATION W/OUT ABNORMAL FINDINGS: ICD-10-CM

## 2024-08-26 DIAGNOSIS — D50.9 IRON DEFICIENCY ANEMIA, UNSPECIFIED: ICD-10-CM

## 2024-08-26 DIAGNOSIS — N93.9 ABNORMAL UTERINE AND VAGINAL BLEEDING, UNSPECIFIED: ICD-10-CM

## 2024-08-26 DIAGNOSIS — E55.9 VITAMIN D DEFICIENCY, UNSPECIFIED: ICD-10-CM

## 2024-08-26 DIAGNOSIS — R20.2 PARESTHESIA OF SKIN: ICD-10-CM

## 2024-08-26 DIAGNOSIS — Z80.42 FAMILY HISTORY OF MALIGNANT NEOPLASM OF PROSTATE: ICD-10-CM

## 2024-08-26 DIAGNOSIS — R73.09 OTHER ABNORMAL GLUCOSE: ICD-10-CM

## 2024-08-26 DIAGNOSIS — Z80.1 FAMILY HISTORY OF MALIGNANT NEOPLASM OF TRACHEA, BRONCHUS AND LUNG: ICD-10-CM

## 2024-08-26 DIAGNOSIS — G45.9 TRANSIENT CEREBRAL ISCHEMIC ATTACK, UNSPECIFIED: ICD-10-CM

## 2024-08-26 PROCEDURE — 99396 PREV VISIT EST AGE 40-64: CPT

## 2024-08-26 PROCEDURE — 36415 COLL VENOUS BLD VENIPUNCTURE: CPT

## 2024-08-26 RX ORDER — ESCITALOPRAM OXALATE 5 MG/1
5 TABLET ORAL DAILY
Refills: 0 | Status: ACTIVE | COMMUNITY

## 2024-08-26 RX ORDER — MEGESTROL ACETATE 40 MG/1
40 TABLET ORAL DAILY
Refills: 0 | Status: ACTIVE | COMMUNITY

## 2024-08-26 RX ORDER — ALPRAZOLAM 0.25 MG/1
0.25 TABLET ORAL
Qty: 30 | Refills: 0 | Status: ACTIVE | COMMUNITY

## 2024-08-26 NOTE — HEALTH RISK ASSESSMENT
[HIV Test offered] : HIV Test offered [Hepatitis C test offered] : Hepatitis C test offered [Good] : ~his/her~  mood as  good [Intercurrent hospitalizations] : was admitted to the hospital  [No] : In the past 12 months have you used drugs other than those required for medical reasons? No [No falls in past year] : Patient reported no falls in the past year [0] : 2) Feeling down, depressed, or hopeless: Not at all (0) [None] : None [Feels Safe at Home] : Feels safe at home [Fully functional (bathing, dressing, toileting, transferring, walking, feeding)] : Fully functional (bathing, dressing, toileting, transferring, walking, feeding) [Fully functional (using the telephone, shopping, preparing meals, housekeeping, doing laundry, using] : Fully functional and needs no help or supervision to perform IADLs (using the telephone, shopping, preparing meals, housekeeping, doing laundry, using transportation, managing medications and managing finances) [Smoke Detector] : smoke detector [Carbon Monoxide Detector] : carbon monoxide detector [Seat Belt] :  uses seat belt [Sunscreen] : uses sunscreen [With Patient/Caregiver] : , with patient/caregiver [FreeTextEntry1] : Check up  (3) assistive equipment and person [de-identified] : As per addendum section. [[id number]] : 0 [Change in mental status noted] : No change in mental status noted [Reports changes in hearing] : Reports no changes in hearing [Reports changes in vision] : Reports no changes in vision [Reports changes in dental health] : Reports no changes in dental health [MammogramComments] : As per GYN. [PapSmearComments] : As per GYN. [AdvancecareDate] : 08/24

## 2024-08-26 NOTE — ASSESSMENT
[FreeTextEntry1] : 44 year old female found to have stable Elevated Hemoglobin A1c, TIA on medication, Iron Deficiency Anemia, Vitamin D Deficiency, with the current prescription regimen as recommended, diet and lifestyle modifications, as counseled. Prior results reviewed, interpreted and discussed with the patient during today's examination, as appropriate. Follow up, treatment plan and tests, as ordered.  Patient was recently hospitalized multiple times, with C/O right-sided tingling and numbness associated with dizziness and palpitation, excessive menstrual blood loss on oral anticoagulants, requiring blood transfusion, S/P outpatient Neurology and Neurosurgical follow-ups, findings and recommendations reviewed and discussed with the patient during today's examination. HEMEONC & CARD F/Us for further assessment for possible etiology of current symptoms and possible treatment interventions, as directed.   Patient was recommended to follow up with GYN for routine examination, PAP smear, reports will be provided, when ready.

## 2024-08-26 NOTE — HISTORY OF PRESENT ILLNESS
[de-identified] : 44 year old female patient with history of stable Elevated Hemoglobin A1c, TIA on medication, Iron Deficiency Anemia, Vitamin D Deficiency, history as stated, presented for an initial annual preventative examination.

## 2024-08-26 NOTE — ADDENDUM
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 At the time she was unable to lift her right leg. Symptoms are associated with dizziness, nausea, and blurred vision. She was in the emergency room about 2 weeks ago for similar dizziness and was discharged with meclizine as needed. She endorses about 2 weeks of intermittent palpitations mostly at night with intermittent chest pressure. She says she has had hot flashes since she was 38 years old. She had 2 episodes of diarrhea on the morning of admission. She has right leg swelling that comes and goes. She also says she has been sleeping more than usual recently. On exam she does complain of a frontal headache with right-sided cheek pain that is not influenced by chewing. She denies any photophobia. She has a history of migraines most recently 5 years ago and does not usually get headaches.  CT head confirmed a 4.5mm supraclinoid aneurysm on the left projecting to the superior anterior. This is unlikely the cause of her symptoms. On exam she does have mild right sided upper extremity and lower extremity weakness. She does not have disturbances to sensation.  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 Neuro Dr. Vicente. CTA neg for actue CVA. MRI revealed 5 mm superiorly directed outpouching arising from the left supraclinoid ICA suggesting an aneurysm (16:60, 17:72), 5 mm medially directed outpouching arising from the right cavernous ICA suggesting an aneurysm (7:8). EEG neg for seizure. TTE benign. Started on ASA/plavix for 21 days out of abundance of precaution, pt safe to d/c and to f/u with o/p neuro and PMD  Given patient's improved clinical status and current hemodynamic stability, decision was made to discharge. Discussed with attending. Please refer to complete medical records for a full hospital course, as this is only a brief summary.  Med Reconciliation: Medication Reconciliation Status Admission Reconciliation is Completed Discharge Reconciliation is Completed Discharge Medications Albuterol (Eqv-ProAir HFA) 90 mcg/inh inhalation aerosol: 2 puff(s) inhaled every 6 hours as needed for shortness of breath and/or wheezing aspirin 81 mg oral tablet, chewable: 1 tab(s) orally once a day atorvastatin 40 mg oral tablet: 1 tab(s) orally once a day (at bedtime) clopidogrel 75 mg oral tablet: 1 tab(s) orally once a day meclizine 12.5 mg oral tablet: 1 tab(s) orally 3 times a day as needed for dizziness , , Care Plan/Procedures: Discharge Diagnoses, Assessment and Plan of Treatment PRINCIPAL DISCHARGE DIAGNOSIS Diagnosis: Transient ischemic attack (TIA) Assessment and Plan of Treatment: You came to the hospital complaining of dizziness, R sided weakness. You were admitted and underwent stroke workup. CT angio showed good brain perfusion and no acute stroke. 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 EEG was negative for seizures. Echocardiogram of your heart was benign. You are being sent with aspirin 81 mg, and plavix 75 mg. Continue to take the baby aspirin until you see your outpatient neurologist or PMD, stop the plavix after 21 days total of taking it. LAst day should be July 31. You had slightly high cholesterol. Take atorvastatin 40 mg at night. You have dizziness, you can take meclizine 12.5 mg up to 3 times a day as needed for dizziness. Follow up outpatient with your primary care doctor and neurologist.   SECONDARY DISCHARGE DIAGNOSES Diagnosis: Brain aneurysm Assessment and Plan of Treatment: please see above Please see your neurologist or neurosurgeon for further management.  Diagnosis: Headache, unspecified Assessment and Plan of Treatment: You told us you have headache, history of migraine in past. You had headache in hospital and you responded well to NSAID medication. Follow up outpatient with your primary care doctor.  Diagnosis: Hyperlipidemia Assessment and Plan of Treatment: please see above  Diagnosis: Transient ischemic attack (TIA) Assessment and Plan of Treatment: You came to the hospital complaining of dizziness, R sided weakness. You were admitted and underwent stroke workup. CT angio showed good brain perfusion and no acute stroke. 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 EEG was negative for seizures. Echocardiogram of your heart was benign. You are being sent with aspirin 81 mg, and plavix 75 mg. Continue to take the baby aspirin until you see your outpatient neurologist or PMD, stop the plavix after 21 days total of taking it. LAst day should be July 31. You had slightly high cholesterol. Take atorvastatin 40 mg at night. You have dizziness, you can take meclizine 12.5 mg up to 3 times a day as needed for dizziness. Follow up outpatient with your primary care doctor and neurologist. Goal(s) To get better and follow your care plan as instructed. Follow Up: Care Providers for Follow up (PCP/Outpatient Provider) [facility] [address] [place] [id number] Phone: [phone] Fax: [phone] Follow Up Time: Additional Provider Info (For SysAdmin Use Only) PROVIDER:[TOKEN:[[id number]]] Care Providers Direct Addresses (For SYSAdmin Use Only) ,DirectAddress_Unknown NPI number (For SysAdmin Use Only) : [[id number]] Patient's Scheduled Appointments Reza Espinosa Physician [facility] NE[facility]G [address] [place] [address] S Scheduled Appointment: 07/25/2024 Discharge Diet Regular Diet - No restrictions Activity Activity as tolerated Quality Measures: Hospice Patient No Core Measure Site Yes Does the patient have a principal diagnosis of ischemic stroke, hemorrhagic stroke, or TIA? Yes... Does the patient have a principal diagnosis of Acute Myocardial Infarction? No Has the patient had a Percutaneous Coronary Intervention? No Final Modified Huang Score at Discharge 1 - No significant disability. Able to carry out all usual activities, despite some symptoms Patient's Risk Factors for Stroke High cholesterol History of a stroke or TIA Obesity Rehabilitation Assessment Patient is at baseline Anticoagulation Therapy for Atrial Fibrillation/Flutter No, not prescribed... Antithrombotic Therapy Yes Statin Therapy Yes Reasons for NOT Prescribing Anticoagulation Therapy Patient does not have atrial fibrillation/flutter Tobacco Usage Within the Last Year No Document Complete: Care Provider Seen in Hospital Rob Vicente Hina Physician Section Complete This document is complete and the patient is ready for discharge. For questions about your prescriptions, please call: [phone] Is this contact telephone number correct? Yes Attending Attestation Statement I have personally seen and examined the patient. I have collaborated with and supervised the . Resident . on the discharge service for the patient. I have reviewed and made amendments to the documentation where necessary. Attending Discharge Physical Examination: CONSTITUTIONAL: Well appearing, well nourished, awake, alert and in no apparent distress ENMT: Airway patent, Nasal mucosa clear. Mouth with normal mucosa. Throat has no vesicles, no oropharyngeal exudates and uvula is midline. EYES: Clear bilaterally, pupils equal, round and reactive to light. EOMI. CARDIAC: Normal rate, regular rhythm. Heart sounds S1, S2. No murmurs, rubs or gallops RESPIRATORY: Breath sounds clear and equal bilaterally. No wheezes, rhales or rhonchi MUSCULOSKELETAL: Spine appears normal, range of motion is not limited, no muscle or joint tenderness EXTREMITIES: No edema, cyanosis or deformity NEUROLOGICAL: Alert and oriented, no focal deficits, no motor or sensory deficits. SKIN: No rash, skin turgor Time Spent: I personally spent ? 35 ? minutes on the discharge service. Date of Discharge Service: 13-Jul-2024 Discharging Attending Physician: Hetal Prado   Electronic Signatures: Hetal Prado) (Signed 13-Jul-2024 16:56) Authored: Care Plan/Procedures, Quality Measures, Document Complete Co-Signer: Hospital Course, Med Reconciliation, Care Plan/Procedures, Follow Up, Quality Measures, Covid Information, Document Complete Eloy Mcnair) (Signed 12-Jul-2024 16:30) Authored: Hospital Course, Med Reconciliation, Care Plan/Procedures, Follow Up, Quality Measures, Document Complete   Last Updated: 13-Jul-2024 16:56 by Hetal Prado)

## 2024-08-26 NOTE — PHYSICAL EXAM
[TextEntry] : CONSTITUTIONAL:  No acute distress, well developed and well appearing. EYES:  Normal sclera/conjunctiva, PERRLA, EOMI. ENT:  Normal outer ears/nose, normal oropharynx. NECK:  No JVD, supple, thyroid normal/no nodules, no lymphadenopathy. PULMONARY:  No respiratory distress, clear to auscultation, no accessory muscle use. CARDIAC:  Normal rate, regular rhythm, normal S1/S2, no murmur. VASCULAR:  No carotid bruits, no abdominal bruit, no varicosities, pedal pulses present, no edema, no extremity clubbing/cyanosis. ABDOMEN:  Normoactive bowel sounds, soft and nontender, no hepatosplenomegaly or masses appreciated. BACK:  No CVA tenderness, no spinal tenderness. MUSCULOSKELETAL:  No joint swelling, grossly normal strength/tone. SKIN:  No rash, normal turgor, pallor noted. NEUROLOGY:  Normal gait and coordination, no focal deficits.

## 2024-08-28 ENCOUNTER — TRANSCRIPTION ENCOUNTER (OUTPATIENT)
Age: 44
End: 2024-08-28

## 2024-08-29 LAB
25(OH)D3 SERPL-MCNC: 19.6 NG/ML
ALBUMIN SERPL ELPH-MCNC: 4.5 G/DL
ALP BLD-CCNC: 91 U/L
ALT SERPL-CCNC: 25 U/L
ANION GAP SERPL CALC-SCNC: 13 MMOL/L
APPEARANCE: CLEAR
AST SERPL-CCNC: 19 U/L
BACTERIA: ABNORMAL /HPF
BASOPHILS # BLD AUTO: 0.05 K/UL
BASOPHILS NFR BLD AUTO: 1.2 %
BILIRUB SERPL-MCNC: 0.6 MG/DL
BILIRUBIN URINE: NEGATIVE
BLOOD URINE: NEGATIVE
BUN SERPL-MCNC: 12 MG/DL
CALCIUM SERPL-MCNC: 9.1 MG/DL
CAST: 0 /LPF
CHLORIDE SERPL-SCNC: 106 MMOL/L
CHOLEST SERPL-MCNC: 92 MG/DL
CO2 SERPL-SCNC: 21 MMOL/L
COLOR: NORMAL
CREAT SERPL-MCNC: 0.56 MG/DL
CREAT SPEC-SCNC: 211 MG/DL
EGFR: 115 ML/MIN/1.73M2
EOSINOPHIL # BLD AUTO: 0.08 K/UL
EOSINOPHIL NFR BLD AUTO: 1.9 %
EPITHELIAL CELLS: 8 /HPF
ESTIMATED AVERAGE GLUCOSE: 100 MG/DL
FOLATE SERPL-MCNC: 17.5 NG/ML
GGT SERPL-CCNC: 16 U/L
GLUCOSE QUALITATIVE U: NEGATIVE MG/DL
GLUCOSE SERPL-MCNC: 114 MG/DL
HBA1C MFR BLD HPLC: 5.1 %
HCT VFR BLD CALC: 29.1 %
HCV AB SER QL: NONREACTIVE
HCV S/CO RATIO: 0.14 S/CO
HDLC SERPL-MCNC: 41 MG/DL
HGB BLD-MCNC: 8.8 G/DL
HIV1+2 AB SPEC QL IA.RAPID: NONREACTIVE
IMM GRANULOCYTES NFR BLD AUTO: 0.2 %
IRON SATN MFR SERPL: 6 %
IRON SERPL-MCNC: 24 UG/DL
KETONES URINE: NEGATIVE MG/DL
LDLC SERPL CALC-MCNC: 41 MG/DL
LEUKOCYTE ESTERASE URINE: NEGATIVE
LYMPHOCYTES # BLD AUTO: 1.2 K/UL
LYMPHOCYTES NFR BLD AUTO: 29 %
MAN DIFF?: NORMAL
MCHC RBC-ENTMCNC: 29.7 PG
MCHC RBC-ENTMCNC: 30.2 GM/DL
MCV RBC AUTO: 98.3 FL
MICROALBUMIN 24H UR DL<=1MG/L-MCNC: 1.7 MG/DL
MICROALBUMIN/CREAT 24H UR-RTO: 8 MG/G
MICROSCOPIC-UA: NORMAL
MONOCYTES # BLD AUTO: 0.4 K/UL
MONOCYTES NFR BLD AUTO: 9.7 %
NEUTROPHILS # BLD AUTO: 2.4 K/UL
NEUTROPHILS NFR BLD AUTO: 58 %
NITRITE URINE: NEGATIVE
NONHDLC SERPL-MCNC: 51 MG/DL
PH URINE: 6
PLATELET # BLD AUTO: 520 K/UL
POTASSIUM SERPL-SCNC: 4 MMOL/L
PROT SERPL-MCNC: 7.2 G/DL
PROTEIN URINE: NORMAL MG/DL
RBC # BLD: 2.96 M/UL
RBC # FLD: 14.7 %
RED BLOOD CELLS URINE: 2 /HPF
SODIUM SERPL-SCNC: 139 MMOL/L
SPECIFIC GRAVITY URINE: 1.02
TIBC SERPL-MCNC: 381 UG/DL
TRIGL SERPL-MCNC: 34 MG/DL
TSH SERPL-ACNC: 0.79 UIU/ML
UIBC SERPL-MCNC: 357 UG/DL
UROBILINOGEN URINE: 1 MG/DL
VIT B12 SERPL-MCNC: 939 PG/ML
WBC # FLD AUTO: 4.14 K/UL
WHITE BLOOD CELLS URINE: 1 /HPF

## 2024-08-29 RX ORDER — ERGOCALCIFEROL 1.25 MG/1
1.25 MG CAPSULE, LIQUID FILLED ORAL
Qty: 12 | Refills: 3 | Status: ACTIVE | COMMUNITY
Start: 2024-08-29 | End: 1900-01-01

## 2024-09-09 ENCOUNTER — NON-APPOINTMENT (OUTPATIENT)
Age: 44
End: 2024-09-09

## 2024-09-09 ENCOUNTER — OUTPATIENT (OUTPATIENT)
Dept: OUTPATIENT SERVICES | Facility: HOSPITAL | Age: 44
LOS: 1 days | Discharge: HOME | End: 2024-09-09
Payer: COMMERCIAL

## 2024-09-09 VITALS
OXYGEN SATURATION: 99 % | TEMPERATURE: 98 F | SYSTOLIC BLOOD PRESSURE: 116 MMHG | DIASTOLIC BLOOD PRESSURE: 82 MMHG | RESPIRATION RATE: 16 BRPM | HEART RATE: 78 BPM | HEIGHT: 61 IN | WEIGHT: 164.91 LBS

## 2024-09-09 DIAGNOSIS — Z86.79 PERSONAL HISTORY OF OTHER DISEASES OF THE CIRCULATORY SYSTEM: ICD-10-CM

## 2024-09-09 LAB
ALBUMIN SERPL ELPH-MCNC: 4.4 G/DL — SIGNIFICANT CHANGE UP (ref 3.3–5)
ALP SERPL-CCNC: 75 U/L — SIGNIFICANT CHANGE UP (ref 40–120)
ALT FLD-CCNC: 34 U/L — SIGNIFICANT CHANGE UP (ref 10–45)
ANION GAP SERPL CALC-SCNC: 9 MMOL/L — SIGNIFICANT CHANGE UP (ref 5–17)
APTT BLD: 36.8 SEC — HIGH (ref 24.5–35.6)
AST SERPL-CCNC: 25 U/L — SIGNIFICANT CHANGE UP (ref 10–40)
BASOPHILS # BLD AUTO: 0.07 K/UL — SIGNIFICANT CHANGE UP (ref 0–0.2)
BASOPHILS NFR BLD AUTO: 1.3 % — SIGNIFICANT CHANGE UP (ref 0–2)
BILIRUB SERPL-MCNC: 0.6 MG/DL — SIGNIFICANT CHANGE UP (ref 0.2–1.2)
BUN SERPL-MCNC: 9 MG/DL — SIGNIFICANT CHANGE UP (ref 7–23)
CALCIUM SERPL-MCNC: 8.9 MG/DL — SIGNIFICANT CHANGE UP (ref 8.4–10.5)
CHLORIDE SERPL-SCNC: 106 MMOL/L — SIGNIFICANT CHANGE UP (ref 96–108)
CO2 SERPL-SCNC: 23 MMOL/L — SIGNIFICANT CHANGE UP (ref 22–31)
CREAT SERPL-MCNC: 0.55 MG/DL — SIGNIFICANT CHANGE UP (ref 0.5–1.3)
EGFR: 116 ML/MIN/1.73M2 — SIGNIFICANT CHANGE UP
EOSINOPHIL # BLD AUTO: 0.17 K/UL — SIGNIFICANT CHANGE UP (ref 0–0.5)
EOSINOPHIL NFR BLD AUTO: 3.2 % — SIGNIFICANT CHANGE UP (ref 0–6)
GLUCOSE SERPL-MCNC: 97 MG/DL — SIGNIFICANT CHANGE UP (ref 70–99)
HCG UR QL: NEGATIVE — SIGNIFICANT CHANGE UP
HCT VFR BLD CALC: 28.4 % — LOW (ref 34.5–45)
HGB BLD-MCNC: 8.7 G/DL — LOW (ref 11.5–15.5)
IMM GRANULOCYTES NFR BLD AUTO: 0.2 % — SIGNIFICANT CHANGE UP (ref 0–0.9)
INR BLD: 0.98 — SIGNIFICANT CHANGE UP (ref 0.85–1.18)
LYMPHOCYTES # BLD AUTO: 1.17 K/UL — SIGNIFICANT CHANGE UP (ref 1–3.3)
LYMPHOCYTES # BLD AUTO: 22.2 % — SIGNIFICANT CHANGE UP (ref 13–44)
MCHC RBC-ENTMCNC: 27.6 PG — SIGNIFICANT CHANGE UP (ref 27–34)
MCHC RBC-ENTMCNC: 30.6 GM/DL — LOW (ref 32–36)
MCV RBC AUTO: 90.2 FL — SIGNIFICANT CHANGE UP (ref 80–100)
MONOCYTES # BLD AUTO: 0.44 K/UL — SIGNIFICANT CHANGE UP (ref 0–0.9)
MONOCYTES NFR BLD AUTO: 8.3 % — SIGNIFICANT CHANGE UP (ref 2–14)
NEUTROPHILS # BLD AUTO: 3.42 K/UL — SIGNIFICANT CHANGE UP (ref 1.8–7.4)
NEUTROPHILS NFR BLD AUTO: 64.8 % — SIGNIFICANT CHANGE UP (ref 43–77)
NRBC # BLD: 0 /100 WBCS — SIGNIFICANT CHANGE UP (ref 0–0)
PLATELET # BLD AUTO: 432 K/UL — HIGH (ref 150–400)
POTASSIUM SERPL-MCNC: 4.3 MMOL/L — SIGNIFICANT CHANGE UP (ref 3.5–5.3)
POTASSIUM SERPL-SCNC: 4.3 MMOL/L — SIGNIFICANT CHANGE UP (ref 3.5–5.3)
PROT SERPL-MCNC: 7 G/DL — SIGNIFICANT CHANGE UP (ref 6–8.3)
PROTHROM AB SERPL-ACNC: 11.2 SEC — SIGNIFICANT CHANGE UP (ref 9.5–13)
RBC # BLD: 3.15 M/UL — LOW (ref 3.8–5.2)
RBC # FLD: 15 % — HIGH (ref 10.3–14.5)
SODIUM SERPL-SCNC: 138 MMOL/L — SIGNIFICANT CHANGE UP (ref 135–145)
WBC # BLD: 5.28 K/UL — SIGNIFICANT CHANGE UP (ref 3.8–10.5)
WBC # FLD AUTO: 5.28 K/UL — SIGNIFICANT CHANGE UP (ref 3.8–10.5)

## 2024-09-09 PROCEDURE — 36226 PLACE CATH VERTEBRAL ART: CPT | Mod: LT

## 2024-09-09 PROCEDURE — C1887: CPT

## 2024-09-09 PROCEDURE — C1769: CPT

## 2024-09-09 PROCEDURE — 85610 PROTHROMBIN TIME: CPT

## 2024-09-09 PROCEDURE — C1760: CPT

## 2024-09-09 PROCEDURE — 81025 URINE PREGNANCY TEST: CPT

## 2024-09-09 PROCEDURE — 80053 COMPREHEN METABOLIC PANEL: CPT

## 2024-09-09 PROCEDURE — 85025 COMPLETE CBC W/AUTO DIFF WBC: CPT

## 2024-09-09 PROCEDURE — 36224 PLACE CATH CAROTD ART: CPT | Mod: 50

## 2024-09-09 PROCEDURE — 85730 THROMBOPLASTIN TIME PARTIAL: CPT

## 2024-09-09 PROCEDURE — C1894: CPT

## 2024-09-09 PROCEDURE — 36415 COLL VENOUS BLD VENIPUNCTURE: CPT

## 2024-09-09 RX ORDER — SODIUM CHLORIDE 9 MG/ML
500 INJECTION INTRAMUSCULAR; INTRAVENOUS; SUBCUTANEOUS
Refills: 0 | Status: DISCONTINUED | OUTPATIENT
Start: 2024-09-09 | End: 2024-09-23

## 2024-09-09 RX ORDER — ACETAMINOPHEN 325 MG/1
650 TABLET ORAL EVERY 6 HOURS
Refills: 0 | Status: DISCONTINUED | OUTPATIENT
Start: 2024-09-09 | End: 2024-09-23

## 2024-09-09 RX ORDER — CHLORHEXIDINE GLUCONATE 40 MG/ML
1 SOLUTION TOPICAL ONCE
Refills: 0 | Status: DISCONTINUED | OUTPATIENT
Start: 2024-09-09 | End: 2024-09-23

## 2024-09-09 RX ADMIN — SODIUM CHLORIDE 75 MILLILITER(S): 9 INJECTION INTRAMUSCULAR; INTRAVENOUS; SUBCUTANEOUS at 17:31

## 2024-09-09 NOTE — PROGRESS NOTE ADULT - ASSESSMENT
Patient is a 43 yo female with history of asthma and bilateral ICA aneurysm (not currently on DAPT) who presents today for diagnostic cerebral angiogram.

## 2024-09-09 NOTE — PROGRESS NOTE ADULT - SUBJECTIVE AND OBJECTIVE BOX
HPI: Patient is a 45 yo female with history of asthma and bilateral ICA aneurysm who presents today for diagnostic cerebral angiogram. Patient reports 1 year history of intermittent and self resolving R leg and arm numbness as well as R facial numbness and dizziness. Initially did not seek medical attention, however on 06/28 and 07/10 of this year, symptoms occurred again. She was brought to [facility] ED both times and was told she had bilateral ICA aneurysms. She was placed on DAPT for 21 days and Atorvastatin 40 mg at during her July admission. Due to excessive menstrual bleeding requiring hospitalization and multiple blood transfusions, DAPT was discontinued about a month later. Ultimately advised to follow up with neurosurgery (Dr. Espinosa) and recommended to undergo formal cerebral angiogram to better assess her vasculature. Today she presents with continued intermittent numbness on the right sided extremities, mostly in her right lateral foot region, R elbow region, and R face. She denies headaches, nausea, vomiting, ataxia, extremity weakness or pain.     Medical Problems:   Asthma   H/o b/l ICA aneurysm    Medications: NKDA   Escitalopram 5 mg PO 1 tablet once daily   Atorvastatin 40 mg 1 tablet     Social History:   Non smoker   No drug use   Social EtOH use     Surgical History: none     Family History:   H/o lung cancer (Mother)  H/o Prostate cancer (father)     Review of Systems:   General: no recent illnesses, no recent wt gain/loss, no chills  Skin/Breast:  no rash, lumps, new moles, erythema, tenderness  Ophthalmologic:  no change in vision, diplopia, pain, redness, tearing, dry eyes	  ENMT:	no hearing loss, tinnitus, ear pain, vertigo, nasal congestion, epistaxis, sore throat  Respiratory and Thorax: no coughing, wheezing, recent URI, shortness of breath	  Cardiovascular: no chest pain, MAHAJAN, leg swelling, irregular rhythm   Gastrointestinal:	no nausea, vomiting, diarrhea, abd pain, bloody stool, heartburn  Genitourinary: no frequency, dysuria, hematuria  Musculoskeletal:	no joint pain, no joint swelling, no tenderness  Neurological:	 see HPI  Psychiatric:	no confusion, no anxiousness, no depression   Hematology/Lymphatics:	no bruising, easy bleeding, LAD  Endocrine:  	no excess urination/thirst, heat/cold intolerance  Allergic/Immunologic:  no urticaria, sneezing, recurrent infections    EXAM:   CONSTITUTIONAL: Well groomed, no apparent distress  EYES: PERRLA and symmetric, EOMI, No conjunctival or scleral injection, non-icteric  ENMT: Oral mucosa with moist membranes. Normal dentition; no pharyngeal injection or exudates  NECK: Supple, symmetric and without tracheal deviation   RESP: No respiratory distress, no use of accessory muscles; CTA b/l, no WRR  CV: RRR, +S1S2, no MRG; no JVD; no peripheral edema  GI: Soft, NT, ND, no rebound, no guarding; no palpable masses; no hepatosplenomegaly; no hernia palpated  LYMPH: No cervical LAD or tenderness; no axillary LAD or tenderness; no inguinal LAD or tenderness  MSK: Normal gait; No digital clubbing or cyanosis; examination of the (head/neck/spine/ribs/pelvis, RUE, LUE, RLE, LLE) without misalignment,   Normal ROM without pain, no spinal tenderness, normal muscle strength/tone  SKIN: No rashes or ulcers noted; no subcutaneous nodules or induration palpable  NEURO: CN II-XII intact; normal reflexes in upper and lower extremities, sensation intact in upper and lower extremities b/l to light touch   PSYCH: Appropriate insight/judgment; A+O x 3, mood and affect appropriate, recent/remote memory intact

## 2024-09-09 NOTE — PROGRESS NOTE ADULT - PROBLEM SELECTOR PLAN 1
Treatment options were discussed with the patient. Patient has elected to proceed with cerebral angiogram. The goals of the procedure and risks were discussed including but are not limited to hematoma, headaches, vision changes, dizziness, and problems associated with anesthesia. The consent was signed and placed in the chart. Plan for patient to be discharged to home after a brief period of observation. She will discuss findings with Dr. Espinosa at her next visit. All questions answered. Above discussed with Dr. Espinosa.

## 2024-09-09 NOTE — BRIEF OPERATIVE NOTE - OPERATION/FINDINGS
Patient was brought to neuroangiography suite, was placed on standard anesthesia monitors, sedated, and under MAC, using a 5 fr sheath right CFA, cerebral angiogram was performed.    Findings: There is a 4 mm x 3.6 mm with 3.1 mm neck aneurysm at the opthalmic segment of the left ICA.  There is a 4.5mm  x 6 mm with 4 mm neck aneurysm at the cavernous segment of the right ICA  Full report to follow.  Patient tolerated procedure well, no new neurological deficit, hemodynamically stable.  Right groin/vasc access site: Closed with vacade nd manual compression, hemostasis achieved, no hematoma.  Patient was transferred to Cath lab recovery area and will go home later today.  Above d/w: Dr. Espinosa

## 2024-09-09 NOTE — BRIEF OPERATIVE NOTE - COMMENTS
Patient will follow up with Dr. Espinosa this Thursday for discussion of findings and treatment options

## 2024-09-12 ENCOUNTER — APPOINTMENT (OUTPATIENT)
Dept: NEUROSURGERY | Facility: CLINIC | Age: 44
End: 2024-09-12

## 2024-09-12 DIAGNOSIS — I72.0 ANEURYSM OF CAROTID ARTERY: ICD-10-CM

## 2024-09-12 PROCEDURE — 99212 OFFICE O/P EST SF 10 MIN: CPT

## 2024-09-12 NOTE — REASON FOR VISIT
[Home] : at home, [unfilled] , at the time of the visit. [Medical Office: ([facility])___] : at the medical office located in  [Verbal consent obtained from patient] : the patient, [unfilled] [de-identified] : Angiogram [de-identified] : 9/9/24

## 2024-09-12 NOTE — HISTORY OF PRESENT ILLNESS
[> 3 months] : more  than 3 months [de-identified] : The pt is a 44 year old right handed female who states one year ago she had her first event where her right leg and arm began tingling, her right face became numb and she felt dizzy. She did not seek out medical care. On June 28th and July 10 of this year the similar symptoms happened. She went to the ED in [facility] both times. 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 She was placed on ASA, Plavix for 21 days ( last dose 7/30/24) and Atorvastin 40mg. In the hospital EEG neg for Sz and ECHO WNL. While on plavix she had significant vaginal bleeding during her menses and was hospitalized. She received several units of PRBC's and plavix stopped.  7/25/24. reports frequent, daily periods of numbness and tingling to her entire right side of her body. 9/9/24: S/P diagnostic cerebral angiogram today: TTM to discuss results of angiogram  PCP: Dr. Rojelio Christine PCP  [address] [facility] [address] [phone]

## 2024-09-12 NOTE — HISTORY OF PRESENT ILLNESS
[> 3 months] : more  than 3 months [de-identified] : The pt is a 44 year old right handed female who states one year ago she had her first event where her right leg and arm began tingling, her right face became numb and she felt dizzy. She did not seek out medical care. On June 28th and July 10 of this year the similar symptoms happened. She went to the ED in [facility] both times. 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 She was placed on ASA, Plavix for 21 days ( last dose 7/30/24) and Atorvastin 40mg. In the hospital EEG neg for Sz and ECHO WNL. While on plavix she had significant vaginal bleeding during her menses and was hospitalized. She received several units of PRBC's and plavix stopped.  7/25/24. reports frequent, daily periods of numbness and tingling to her entire right side of her body. 9/9/24: S/P diagnostic cerebral angiogram today: TTM to discuss results of angiogram  PCP: Dr. Rojelio Christine PCP  [address] [facility] [address] [phone]

## 2024-09-12 NOTE — REASON FOR VISIT
[Home] : at home, [unfilled] , at the time of the visit. [Medical Office: ([facility])___] : at the medical office located in  [Verbal consent obtained from patient] : the patient, [unfilled] [de-identified] : Angiogram [de-identified] : 9/9/24

## 2024-09-16 ENCOUNTER — APPOINTMENT (OUTPATIENT)
Dept: CARDIOLOGY | Facility: CLINIC | Age: 44
End: 2024-09-16
Payer: COMMERCIAL

## 2024-09-16 ENCOUNTER — NON-APPOINTMENT (OUTPATIENT)
Age: 44
End: 2024-09-16

## 2024-09-16 VITALS
DIASTOLIC BLOOD PRESSURE: 66 MMHG | SYSTOLIC BLOOD PRESSURE: 99 MMHG | HEIGHT: 61 IN | OXYGEN SATURATION: 98 % | HEART RATE: 79 BPM | RESPIRATION RATE: 16 BRPM | BODY MASS INDEX: 30.96 KG/M2 | TEMPERATURE: 97 F | WEIGHT: 164 LBS

## 2024-09-16 DIAGNOSIS — R00.2 PALPITATIONS: ICD-10-CM

## 2024-09-16 DIAGNOSIS — E78.5 HYPERLIPIDEMIA, UNSPECIFIED: ICD-10-CM

## 2024-09-16 PROCEDURE — 99203 OFFICE O/P NEW LOW 30 MIN: CPT | Mod: 25

## 2024-09-16 PROCEDURE — 93000 ELECTROCARDIOGRAM COMPLETE: CPT | Mod: 59

## 2024-09-16 PROCEDURE — 93246 EXT ECG>7D<15D RECORDING: CPT

## 2024-09-16 NOTE — REVIEW OF SYSTEMS
[Palpitations] : palpitations [Numbness (Hypoesthesia)] : numbness [Tingling (Paresthesia)] : tingling [Anxiety] : anxiety [Negative] : Respiratory [Blurry Vision] : no blurred vision [Dyspnea on exertion] : not dyspnea during exertion [Chest Discomfort] : no chest discomfort [Lower Ext Edema] : no extremity edema [Orthopnea] : no orthopnea [PND] : no PND [Abdominal Pain] : no abdominal pain [Nausea] : no nausea [Vomiting] : no vomiting [Heartburn] : no heartburn [Joint Pain] : no joint pain [Rash] : no rash [Itching] : no itching [Dizziness] : no dizziness [Confusion] : no confusion was observed [Under Stress] : not under stress [Easy Bleeding] : no tendency for easy bleeding Pt hypothermic overnight, blood pressures borderline today, mental status remains lethargic but will answer questions and become more alert when engaging in conversation-- has warming blanket on which was started overnight. On exam noted liquid black stool pt incontinent of. 4+ pitting edema, but lung exam relatively clear. pt would not let me examine abdomen. hgb shows acute hemoglobin drop 9.4--> 7.4. 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 On IV synthroid per endo recs given lack of improvement in T4 while being on synthroid inpatient. Receiving IVF. Pts condition overall appears to be deteriorating and her prognosis is poor. Although family overall wants "comfort measures" the acuity of her clinical status has worsened and at this time I suspect she would not be able to tolerate JETT (which has been their goal). Dr. Alexander asked pts son Reymundo for permission to call palliative care consult and he was agreeable. Would like to pursue other options for patient following discharge including home with hospice, and perhaps if her condition continues to worsen, even inpatient hospice/PCU. For now will continue supportive care as family is agreeable with IVF and receiving blood and IV medications. They already have specified they would not want feeding tubes, pressors, central line, intubation, pt is DNR. Will hold off on repeat imaging at this time as no intervention or aggressive measures would be taken outside of what we are currently doing. [Easy Bruising] : no tendency for easy bruising Pt hypothermic overnight, blood pressures borderline today, mental status remains lethargic but will answer questions and become more alert when engaging in conversation-- has warming blanket on which was started overnight. On exam noted liquid black stool pt incontinent of. 4+ pitting edema, but lung exam relatively clear. pt would not let me examine abdomen. given IV lasix yesterday as trial for edema, but will hold off today. hgb shows acute hemoglobin drop 9.4--> 7.4. 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 On IV synthroid per endo recs given lack of improvement in T4 while being on synthroid inpatient. Receiving IVF. Pts condition overall appears to be deteriorating and her prognosis is poor. Although family overall wants "comfort measures" the acuity of her clinical status has worsened and at this time I suspect she would not be able to tolerate JETT (which has been their goal). Dr. Alexander asked pts son Reymundo for permission to call palliative care consult and he was agreeable. Would like to pursue other options for patient following discharge including home with hospice, and perhaps if her condition continues to worsen, even inpatient hospice/PCU. For now will continue supportive care as family is agreeable with IVF and receiving blood and IV medications. They already have specified they would not want feeding tubes, pressors, central line, intubation, pt is DNR. Will hold off on repeat imaging at this time as no intervention or aggressive measures would be taken outside of what we are currently doing.

## 2024-09-16 NOTE — HISTORY OF PRESENT ILLNESS
[FreeTextEntry1] : Thuy Blackwell is a 44- year- old female with hyperlipidemia comes for cardiac evaluation. Recently admitted to [facility] with tingling and numbness in the right arm and leg. Had Echo with bubble study showed normal LV systolic function and no intracardiac shunt. Had CT/MRI brain showed bilateral ICA. Seen by Neurosurgery and having management plan most likely surgery. Seen by neurologist. Denies any chest pain or shortness of breath on exertion. Complaining of random onset of palpitations., brief episodes, 3 times a week. Denies any caffeinated drinks. Physically active. Neurologist thought symptoms may be secondary to Anxiety and started on Alprazolam. Partial improvement in symptoms.

## 2024-09-16 NOTE — DISCUSSION/SUMMARY
[FreeTextEntry1] : In a summary Ms. Ritter Thuy is a middle- aged- female with hyperlipidemia, continue Atorvastatin. Palpitations, 2-week Holter monitor to rule out arrhythmias. Further plan based on Holter results.

## 2024-09-19 DIAGNOSIS — I67.1 CEREBRAL ANEURYSM, NONRUPTURED: ICD-10-CM

## 2024-09-19 DIAGNOSIS — J45.909 UNSPECIFIED ASTHMA, UNCOMPLICATED: ICD-10-CM

## 2024-09-20 ENCOUNTER — NON-APPOINTMENT (OUTPATIENT)
Age: 44
End: 2024-09-20

## 2024-09-28 ENCOUNTER — APPOINTMENT (OUTPATIENT)
Dept: INTERNAL MEDICINE | Facility: CLINIC | Age: 44
End: 2024-09-28
Payer: COMMERCIAL

## 2024-09-28 VITALS
BODY MASS INDEX: 30.78 KG/M2 | DIASTOLIC BLOOD PRESSURE: 66 MMHG | HEART RATE: 73 BPM | TEMPERATURE: 97.8 F | HEIGHT: 61 IN | OXYGEN SATURATION: 99 % | SYSTOLIC BLOOD PRESSURE: 106 MMHG | RESPIRATION RATE: 16 BRPM | WEIGHT: 163 LBS

## 2024-09-28 DIAGNOSIS — R73.09 OTHER ABNORMAL GLUCOSE: ICD-10-CM

## 2024-09-28 DIAGNOSIS — D50.9 IRON DEFICIENCY ANEMIA, UNSPECIFIED: ICD-10-CM

## 2024-09-28 DIAGNOSIS — G45.9 TRANSIENT CEREBRAL ISCHEMIC ATTACK, UNSPECIFIED: ICD-10-CM

## 2024-09-28 PROCEDURE — 36415 COLL VENOUS BLD VENIPUNCTURE: CPT

## 2024-09-28 PROCEDURE — 99213 OFFICE O/P EST LOW 20 MIN: CPT

## 2024-09-28 RX ORDER — CHLORHEXIDINE GLUCONATE 4 %
325 (65 FE) LIQUID (ML) TOPICAL TWICE DAILY
Qty: 180 | Refills: 3 | Status: ACTIVE | COMMUNITY
Start: 2024-09-28 | End: 1900-01-01

## 2024-09-28 NOTE — HEALTH RISK ASSESSMENT
[No] : In the past 12 months have you used drugs other than those required for medical reasons? No [No falls in past year] : Patient reported no falls in the past year [0] : 2) Feeling down, depressed, or hopeless: Not at all (0) [de-identified] : NEURO/NEURO SX/CARD [[id number]] : 0

## 2024-09-28 NOTE — HISTORY OF PRESENT ILLNESS
[de-identified] : 44 year old  female patient with history of stable Elevated Hemoglobin A1c, TIA on medication, Iron Deficiency Anemia, Vitamin D Deficiency, history as stated, presented for follow up examination. Patient is compliant with all medications. ROS as stated.

## 2024-09-28 NOTE — HEALTH RISK ASSESSMENT
[No] : In the past 12 months have you used drugs other than those required for medical reasons? No [No falls in past year] : Patient reported no falls in the past year [0] : 2) Feeling down, depressed, or hopeless: Not at all (0) [de-identified] : NEURO/NEURO SX/CARD [[id number]] : 0

## 2024-09-28 NOTE — HISTORY OF PRESENT ILLNESS
[de-identified] : 44 year old  female patient with history of stable Elevated Hemoglobin A1c, TIA on medication, Iron Deficiency Anemia, Vitamin D Deficiency, history as stated, presented for follow up examination. Patient is compliant with all medications. ROS as stated.

## 2024-09-28 NOTE — ASSESSMENT
[FreeTextEntry1] : 44 year old female found to have stable Elevated Hemoglobin A1c, TIA on medication, Iron Deficiency Anemia, Vitamin D Deficiency, with the current prescription regimen as recommended, diet and lifestyle modifications, as counseled. Prior results reviewed, interpreted and discussed with the patient during today's examination, as appropriate. Follow up, treatment plan and tests, as ordered.   Total time spent:: 25 minutes Including: Preparation prior to visit - Reviewing prior record, results of tests and Consultation Reports as applicable Conducting an appropriate H & P during today's encounter Appropriate orders for tests, medications and procedures, as applicable Counseling patient Note completion

## 2024-09-30 DIAGNOSIS — J45.909 UNSPECIFIED ASTHMA, UNCOMPLICATED: ICD-10-CM

## 2024-09-30 LAB
ALBUMIN SERPL ELPH-MCNC: 4.6 G/DL
ALP BLD-CCNC: 86 U/L
ALT SERPL-CCNC: 43 U/L
ANION GAP SERPL CALC-SCNC: 12 MMOL/L
AST SERPL-CCNC: 26 U/L
BILIRUB SERPL-MCNC: 0.6 MG/DL
BUN SERPL-MCNC: 11 MG/DL
CALCIUM SERPL-MCNC: 9.2 MG/DL
CHLORIDE SERPL-SCNC: 104 MMOL/L
CHOLEST SERPL-MCNC: 134 MG/DL
CO2 SERPL-SCNC: 23 MMOL/L
CREAT SERPL-MCNC: 0.54 MG/DL
EGFR: 116 ML/MIN/1.73M2
ESTIMATED AVERAGE GLUCOSE: 108 MG/DL
GGT SERPL-CCNC: 14 U/L
GLUCOSE SERPL-MCNC: 97 MG/DL
HBA1C MFR BLD HPLC: 5.4 %
HCT VFR BLD CALC: 30.8 %
HDLC SERPL-MCNC: 58 MG/DL
HGB BLD-MCNC: 9.4 G/DL
IRON SATN MFR SERPL: 3 %
IRON SERPL-MCNC: 13 UG/DL
LDLC SERPL CALC-MCNC: 64 MG/DL
MCHC RBC-ENTMCNC: 25.8 PG
MCHC RBC-ENTMCNC: 30.5 GM/DL
MCV RBC AUTO: 84.6 FL
NONHDLC SERPL-MCNC: 75 MG/DL
PLATELET # BLD AUTO: 408 K/UL
POTASSIUM SERPL-SCNC: 4.2 MMOL/L
PROT SERPL-MCNC: 7.2 G/DL
RBC # BLD: 3.64 M/UL
RBC # FLD: 15.9 %
SODIUM SERPL-SCNC: 138 MMOL/L
TIBC SERPL-MCNC: 434 UG/DL
TRIGL SERPL-MCNC: 54 MG/DL
UIBC SERPL-MCNC: 421 UG/DL
WBC # FLD AUTO: 4.17 K/UL

## 2024-09-30 RX ORDER — AZITHROMYCIN 250 MG/1
250 TABLET, FILM COATED ORAL
Qty: 6 | Refills: 0 | Status: ACTIVE | COMMUNITY
Start: 2024-09-30 | End: 1900-01-01

## 2024-09-30 RX ORDER — PROMETHAZINE HYDROCHLORIDE AND DEXTROMETHORPHAN HYDROBROMIDE ORAL SOLUTION 15; 6.25 MG/5ML; MG/5ML
6.25-15 SOLUTION ORAL
Qty: 210 | Refills: 2 | Status: ACTIVE | COMMUNITY
Start: 2024-09-30 | End: 1900-01-01

## 2024-09-30 RX ORDER — PREDNISONE 10 MG/1
10 TABLET ORAL
Qty: 21 | Refills: 0 | Status: ACTIVE | COMMUNITY
Start: 2024-09-30 | End: 1900-01-01

## 2024-10-23 ENCOUNTER — APPOINTMENT (OUTPATIENT)
Dept: CARDIOLOGY | Facility: CLINIC | Age: 44
End: 2024-10-23

## 2024-10-23 DIAGNOSIS — R00.2 PALPITATIONS: ICD-10-CM

## 2024-10-23 PROCEDURE — 93244 EXT ECG>48HR<7D REV&INTERPJ: CPT

## 2024-11-01 ENCOUNTER — NON-APPOINTMENT (OUTPATIENT)
Age: 44
End: 2024-11-01

## 2024-11-16 ENCOUNTER — NON-APPOINTMENT (OUTPATIENT)
Age: 44
End: 2024-11-16

## 2024-11-16 ENCOUNTER — LABORATORY RESULT (OUTPATIENT)
Age: 44
End: 2024-11-16

## 2024-11-16 ENCOUNTER — APPOINTMENT (OUTPATIENT)
Dept: INTERNAL MEDICINE | Facility: CLINIC | Age: 44
End: 2024-11-16
Payer: COMMERCIAL

## 2024-11-16 VITALS
HEIGHT: 61 IN | HEART RATE: 78 BPM | RESPIRATION RATE: 16 BRPM | BODY MASS INDEX: 30.78 KG/M2 | TEMPERATURE: 98 F | DIASTOLIC BLOOD PRESSURE: 73 MMHG | WEIGHT: 163 LBS | OXYGEN SATURATION: 96 % | SYSTOLIC BLOOD PRESSURE: 105 MMHG

## 2024-11-16 DIAGNOSIS — E78.5 HYPERLIPIDEMIA, UNSPECIFIED: ICD-10-CM

## 2024-11-16 DIAGNOSIS — I72.0 ANEURYSM OF CAROTID ARTERY: ICD-10-CM

## 2024-11-16 DIAGNOSIS — D50.9 IRON DEFICIENCY ANEMIA, UNSPECIFIED: ICD-10-CM

## 2024-11-16 DIAGNOSIS — Z01.818 ENCOUNTER FOR OTHER PREPROCEDURAL EXAMINATION: ICD-10-CM

## 2024-11-16 DIAGNOSIS — R73.09 OTHER ABNORMAL GLUCOSE: ICD-10-CM

## 2024-11-16 PROCEDURE — 93000 ELECTROCARDIOGRAM COMPLETE: CPT

## 2024-11-16 PROCEDURE — 36415 COLL VENOUS BLD VENIPUNCTURE: CPT

## 2024-11-16 PROCEDURE — 99213 OFFICE O/P EST LOW 20 MIN: CPT

## 2024-11-17 LAB
ALBUMIN SERPL ELPH-MCNC: 4.5 G/DL
ALP BLD-CCNC: 94 U/L
ALT SERPL-CCNC: 27 U/L
ANION GAP SERPL CALC-SCNC: 13 MMOL/L
APTT BLD: 35.3 SEC
AST SERPL-CCNC: 23 U/L
BASOPHILS # BLD AUTO: 0.07 K/UL
BASOPHILS NFR BLD AUTO: 1.7 %
BILIRUB SERPL-MCNC: 0.6 MG/DL
BUN SERPL-MCNC: 20 MG/DL
CALCIUM SERPL-MCNC: 9.4 MG/DL
CHLORIDE SERPL-SCNC: 105 MMOL/L
CHOLEST SERPL-MCNC: 186 MG/DL
CO2 SERPL-SCNC: 22 MMOL/L
CREAT SERPL-MCNC: 0.54 MG/DL
EGFR: 116 ML/MIN/1.73M2
EOSINOPHIL # BLD AUTO: 0.19 K/UL
EOSINOPHIL NFR BLD AUTO: 4.6 %
ESTIMATED AVERAGE GLUCOSE: 123 MG/DL
GGT SERPL-CCNC: 18 U/L
GLUCOSE SERPL-MCNC: 95 MG/DL
HBA1C MFR BLD HPLC: 5.9 %
HCG SERPL-MCNC: 1 MIU/ML
HCT VFR BLD CALC: 39.8 %
HDLC SERPL-MCNC: 66 MG/DL
HGB BLD-MCNC: 12 G/DL
IMM GRANULOCYTES NFR BLD AUTO: 0.2 %
INR PPP: 0.98 RATIO
LDLC SERPL CALC-MCNC: 111 MG/DL
LYMPHOCYTES # BLD AUTO: 1.76 K/UL
LYMPHOCYTES NFR BLD AUTO: 42.3 %
MAN DIFF?: NORMAL
MCHC RBC-ENTMCNC: 24.8 PG
MCHC RBC-ENTMCNC: 30.2 G/DL
MCV RBC AUTO: 82.4 FL
MONOCYTES # BLD AUTO: 0.29 K/UL
MONOCYTES NFR BLD AUTO: 7 %
NEUTROPHILS # BLD AUTO: 1.84 K/UL
NEUTROPHILS NFR BLD AUTO: 44.2 %
NONHDLC SERPL-MCNC: 120 MG/DL
PLATELET # BLD AUTO: 364 K/UL
POTASSIUM SERPL-SCNC: 4.4 MMOL/L
PROT SERPL-MCNC: 7.6 G/DL
PT BLD: 11.6 SEC
RBC # BLD: 4.83 M/UL
RBC # FLD: 21.6 %
SODIUM SERPL-SCNC: 140 MMOL/L
TRIGL SERPL-MCNC: 49 MG/DL
WBC # FLD AUTO: 4.16 K/UL

## 2024-12-02 VITALS
HEIGHT: 61 IN | WEIGHT: 171.96 LBS | DIASTOLIC BLOOD PRESSURE: 71 MMHG | RESPIRATION RATE: 16 BRPM | TEMPERATURE: 97 F | HEART RATE: 64 BPM | OXYGEN SATURATION: 97 % | SYSTOLIC BLOOD PRESSURE: 110 MMHG

## 2024-12-02 NOTE — PATIENT PROFILE ADULT - NSPROPTRIGHTSUPPORTPHONE_GEN_A_NUR
Patient is calling about taking a fall in the kitchen and hit his head broke the glass door with the back of his head, no bleeding. He states he just wants this noted on record. He was was not seen after the fall states he is ok.  He will be applying for a independent living or assisted living one day and wants all the falls noted.   Patient wants call back in afternoon, he is out in the morning he wants to discuss the assisted living or independent living.   Aware doctor is out can call tomorrow   [phone]

## 2024-12-02 NOTE — PATIENT PROFILE ADULT - VISION (WITH CORRECTIVE LENSES IF THE PATIENT USUALLY WEARS THEM):
Opt out
Partially impaired: cannot see medication labels or newsprint, but can see obstacles in path, and the surrounding layout; can count fingers at arm's length

## 2024-12-02 NOTE — PRE-OP CHECKLIST - ISOLATION PRECAUTIONS
none 160 Purse String (Simple) Text: Given the location of the defect and the characteristics of the surrounding skin a purse string simple closure was deemed most appropriate.  Undermining was performed circumferentially around the surgical defect.  A purse string suture was then placed and tightened.

## 2024-12-03 ENCOUNTER — APPOINTMENT (OUTPATIENT)
Dept: NEUROSURGERY | Facility: HOSPITAL | Age: 44
End: 2024-12-03

## 2024-12-03 ENCOUNTER — TRANSCRIPTION ENCOUNTER (OUTPATIENT)
Age: 44
End: 2024-12-03

## 2024-12-03 ENCOUNTER — INPATIENT (INPATIENT)
Facility: HOSPITAL | Age: 44
LOS: 4 days | Discharge: HOME HEALTH | End: 2024-12-08
Attending: NEUROLOGICAL SURGERY | Admitting: NEUROLOGICAL SURGERY
Payer: COMMERCIAL

## 2024-12-03 DIAGNOSIS — Z98.890 OTHER SPECIFIED POSTPROCEDURAL STATES: Chronic | ICD-10-CM

## 2024-12-03 LAB
ALBUMIN SERPL ELPH-MCNC: 3.6 G/DL — SIGNIFICANT CHANGE UP (ref 3.3–5)
ALP SERPL-CCNC: 69 U/L — SIGNIFICANT CHANGE UP (ref 40–120)
ALT FLD-CCNC: 15 U/L — SIGNIFICANT CHANGE UP (ref 10–45)
AMMONIA BLD-MCNC: 27 UMOL/L — SIGNIFICANT CHANGE UP (ref 11–55)
ANION GAP SERPL CALC-SCNC: 11 MMOL/L — SIGNIFICANT CHANGE UP (ref 5–17)
ANION GAP SERPL CALC-SCNC: 13 MMOL/L — SIGNIFICANT CHANGE UP (ref 5–17)
APTT BLD: 27.1 SEC — SIGNIFICANT CHANGE UP (ref 24.5–35.6)
AST SERPL-CCNC: 19 U/L — SIGNIFICANT CHANGE UP (ref 10–40)
BASE EXCESS BLDA CALC-SCNC: -4.5 MMOL/L — LOW (ref -2–3)
BILIRUB SERPL-MCNC: 0.5 MG/DL — SIGNIFICANT CHANGE UP (ref 0.2–1.2)
BLD GP AB SCN SERPL QL: NEGATIVE — SIGNIFICANT CHANGE UP
BUN SERPL-MCNC: 10 MG/DL — SIGNIFICANT CHANGE UP (ref 7–23)
BUN SERPL-MCNC: 11 MG/DL — SIGNIFICANT CHANGE UP (ref 7–23)
CALCIUM SERPL-MCNC: 7.5 MG/DL — LOW (ref 8.4–10.5)
CALCIUM SERPL-MCNC: 8.7 MG/DL — SIGNIFICANT CHANGE UP (ref 8.4–10.5)
CHLORIDE SERPL-SCNC: 101 MMOL/L — SIGNIFICANT CHANGE UP (ref 96–108)
CHLORIDE SERPL-SCNC: 104 MMOL/L — SIGNIFICANT CHANGE UP (ref 96–108)
CK SERPL-CCNC: 173 U/L — HIGH (ref 25–170)
CO2 BLDA-SCNC: 20 MMOL/L — SIGNIFICANT CHANGE UP (ref 19–24)
CO2 SERPL-SCNC: 18 MMOL/L — LOW (ref 22–31)
CO2 SERPL-SCNC: 19 MMOL/L — LOW (ref 22–31)
CREAT SERPL-MCNC: 0.44 MG/DL — LOW (ref 0.5–1.3)
CREAT SERPL-MCNC: 0.52 MG/DL — SIGNIFICANT CHANGE UP (ref 0.5–1.3)
EGFR: 117 ML/MIN/1.73M2 — SIGNIFICANT CHANGE UP
EGFR: 122 ML/MIN/1.73M2 — SIGNIFICANT CHANGE UP
GLUCOSE SERPL-MCNC: 173 MG/DL — HIGH (ref 70–99)
GLUCOSE SERPL-MCNC: 195 MG/DL — HIGH (ref 70–99)
HCO3 BLDA-SCNC: 19 MMOL/L — LOW (ref 21–28)
HCT VFR BLD CALC: 33.9 % — LOW (ref 34.5–45)
HCT VFR BLD CALC: 34.8 % — SIGNIFICANT CHANGE UP (ref 34.5–45)
HGB BLD-MCNC: 10.8 G/DL — LOW (ref 11.5–15.5)
HGB BLD-MCNC: 11 G/DL — LOW (ref 11.5–15.5)
INR BLD: 1.03 — SIGNIFICANT CHANGE UP (ref 0.85–1.16)
MAGNESIUM SERPL-MCNC: 1.8 MG/DL — SIGNIFICANT CHANGE UP (ref 1.6–2.6)
MCHC RBC-ENTMCNC: 24.4 PG — LOW (ref 27–34)
MCHC RBC-ENTMCNC: 24.5 PG — LOW (ref 27–34)
MCHC RBC-ENTMCNC: 31.6 G/DL — LOW (ref 32–36)
MCHC RBC-ENTMCNC: 31.9 G/DL — LOW (ref 32–36)
MCV RBC AUTO: 77 FL — LOW (ref 80–100)
MCV RBC AUTO: 77.2 FL — LOW (ref 80–100)
NRBC # BLD: 0 /100 WBCS — SIGNIFICANT CHANGE UP (ref 0–0)
NRBC # BLD: 0 /100 WBCS — SIGNIFICANT CHANGE UP (ref 0–0)
PCO2 BLDA: 30 MMHG — LOW (ref 32–45)
PCO2 BLDA: SIGNIFICANT CHANGE UP MMHG (ref 32–45)
PH BLDA: 7.41 — SIGNIFICANT CHANGE UP (ref 7.35–7.45)
PH BLDA: SIGNIFICANT CHANGE UP (ref 7.35–7.45)
PHOSPHATE SERPL-MCNC: 2.9 MG/DL — SIGNIFICANT CHANGE UP (ref 2.5–4.5)
PLATELET # BLD AUTO: 281 K/UL — SIGNIFICANT CHANGE UP (ref 150–400)
PLATELET # BLD AUTO: 374 K/UL — SIGNIFICANT CHANGE UP (ref 150–400)
PO2 BLDA: 80 MMHG — LOW (ref 83–108)
PO2 BLDA: SIGNIFICANT CHANGE UP MMHG (ref 83–108)
POTASSIUM SERPL-MCNC: 3.6 MMOL/L — SIGNIFICANT CHANGE UP (ref 3.5–5.3)
POTASSIUM SERPL-MCNC: 4.2 MMOL/L — SIGNIFICANT CHANGE UP (ref 3.5–5.3)
POTASSIUM SERPL-SCNC: 3.6 MMOL/L — SIGNIFICANT CHANGE UP (ref 3.5–5.3)
POTASSIUM SERPL-SCNC: 4.2 MMOL/L — SIGNIFICANT CHANGE UP (ref 3.5–5.3)
PROT SERPL-MCNC: 6 G/DL — SIGNIFICANT CHANGE UP (ref 6–8.3)
PROTHROM AB SERPL-ACNC: 11.8 SEC — SIGNIFICANT CHANGE UP (ref 9.9–13.4)
RBC # BLD: 4.4 M/UL — SIGNIFICANT CHANGE UP (ref 3.8–5.2)
RBC # BLD: 4.51 M/UL — SIGNIFICANT CHANGE UP (ref 3.8–5.2)
RBC # FLD: 21.3 % — HIGH (ref 10.3–14.5)
RBC # FLD: 21.6 % — HIGH (ref 10.3–14.5)
RH IG SCN BLD-IMP: POSITIVE — SIGNIFICANT CHANGE UP
SAO2 % BLDA: 94.8 % — SIGNIFICANT CHANGE UP (ref 94–98)
SAO2 % BLDA: 95.2 % — SIGNIFICANT CHANGE UP (ref 94–98)
SODIUM SERPL-SCNC: 133 MMOL/L — LOW (ref 135–145)
SODIUM SERPL-SCNC: 133 MMOL/L — LOW (ref 135–145)
TROPONIN T, HIGH SENSITIVITY RESULT: <6 NG/L — SIGNIFICANT CHANGE UP (ref 0–51)
WBC # BLD: 12.24 K/UL — HIGH (ref 3.8–10.5)
WBC # BLD: 13.28 K/UL — HIGH (ref 3.8–10.5)
WBC # FLD AUTO: 12.24 K/UL — HIGH (ref 3.8–10.5)
WBC # FLD AUTO: 13.28 K/UL — HIGH (ref 3.8–10.5)

## 2024-12-03 PROCEDURE — 76937 US GUIDE VASCULAR ACCESS: CPT | Mod: 26

## 2024-12-03 PROCEDURE — 61700 BRAIN ANEURYSM REPR SIMPLE: CPT

## 2024-12-03 PROCEDURE — 35701 EXPL N/FLWD SURG NECK ART: CPT | Mod: 59,LT

## 2024-12-03 PROCEDURE — 0042T: CPT

## 2024-12-03 PROCEDURE — 71045 X-RAY EXAM CHEST 1 VIEW: CPT | Mod: 26

## 2024-12-03 PROCEDURE — 70498 CT ANGIOGRAPHY NECK: CPT | Mod: 26

## 2024-12-03 PROCEDURE — 70450 CT HEAD/BRAIN W/O DYE: CPT | Mod: 26

## 2024-12-03 PROCEDURE — 99291 CRITICAL CARE FIRST HOUR: CPT

## 2024-12-03 PROCEDURE — 36224 PLACE CATH CAROTD ART: CPT | Mod: LT

## 2024-12-03 PROCEDURE — 67570 DECOMPRESS OPTIC NERVE: CPT | Mod: LT

## 2024-12-03 PROCEDURE — 70496 CT ANGIOGRAPHY HEAD: CPT | Mod: 26

## 2024-12-03 DEVICE — TACHOSIL 9.5 X 4.8CM: Type: IMPLANTABLE DEVICE | Site: LEFT | Status: FUNCTIONAL

## 2024-12-03 DEVICE — ELECT 8 CONTACT 0.76X4.5MM: Type: IMPLANTABLE DEVICE | Site: LEFT | Status: FUNCTIONAL

## 2024-12-03 DEVICE — SURGIFLO HEMOSTATIC MATRIX KIT: Type: IMPLANTABLE DEVICE | Site: LEFT | Status: FUNCTIONAL

## 2024-12-03 DEVICE — SURGCEL 4 X 8": Type: IMPLANTABLE DEVICE | Site: LEFT | Status: FUNCTIONAL

## 2024-12-03 DEVICE — IMPLANTABLE DEVICE: Type: IMPLANTABLE DEVICE | Site: LEFT | Status: FUNCTIONAL

## 2024-12-03 DEVICE — PLATE UN3 2 HOLE: Type: IMPLANTABLE DEVICE | Site: LEFT | Status: FUNCTIONAL

## 2024-12-03 DEVICE — SURGIFOAM PAD 8CM X 12.5CM X 10MM (100): Type: IMPLANTABLE DEVICE | Site: LEFT | Status: FUNCTIONAL

## 2024-12-03 DEVICE — SCREW UN3 AXS SELF DRILL 1.5X4MM: Type: IMPLANTABLE DEVICE | Site: LEFT | Status: FUNCTIONAL

## 2024-12-03 DEVICE — PLATE UN3 W/TAB 7MM: Type: IMPLANTABLE DEVICE | Site: LEFT | Status: FUNCTIONAL

## 2024-12-03 RX ORDER — POLYETHYLENE GLYCOL 3350 17 G/17G
17 POWDER, FOR SOLUTION ORAL DAILY
Refills: 0 | Status: DISCONTINUED | OUTPATIENT
Start: 2024-12-03 | End: 2024-12-08

## 2024-12-03 RX ORDER — CEFAZOLIN SODIUM 10 G
2000 VIAL (EA) INJECTION EVERY 8 HOURS
Refills: 0 | Status: COMPLETED | OUTPATIENT
Start: 2024-12-03 | End: 2024-12-04

## 2024-12-03 RX ORDER — PANTOPRAZOLE SODIUM 40 MG/1
40 TABLET, DELAYED RELEASE ORAL DAILY
Refills: 0 | Status: DISCONTINUED | OUTPATIENT
Start: 2024-12-03 | End: 2024-12-05

## 2024-12-03 RX ORDER — FERROUS SULFATE 325(65) MG
325 TABLET ORAL
Refills: 0 | DISCHARGE

## 2024-12-03 RX ORDER — SODIUM BICARBONATE 84 MG/ML
650 INJECTION, SOLUTION INTRAVENOUS ONCE
Refills: 0 | Status: COMPLETED | OUTPATIENT
Start: 2024-12-03 | End: 2024-12-03

## 2024-12-03 RX ORDER — OXYCODONE HYDROCHLORIDE 30 MG/1
10 TABLET ORAL EVERY 4 HOURS
Refills: 0 | Status: DISCONTINUED | OUTPATIENT
Start: 2024-12-03 | End: 2024-12-03

## 2024-12-03 RX ORDER — IPRATROPIUM BROMIDE AND ALBUTEROL SULFATE 2.5; .5 MG/3ML; MG/3ML
3 SOLUTION RESPIRATORY (INHALATION) EVERY 6 HOURS
Refills: 0 | Status: DISCONTINUED | OUTPATIENT
Start: 2024-12-03 | End: 2024-12-06

## 2024-12-03 RX ORDER — ALBUTEROL 90 MCG
2 AEROSOL (GRAM) INHALATION EVERY 6 HOURS
Refills: 0 | Status: DISCONTINUED | OUTPATIENT
Start: 2024-12-03 | End: 2024-12-08

## 2024-12-03 RX ORDER — LEVETIRACETAM 1000 MG/1
1000 TABLET ORAL ONCE
Refills: 0 | Status: COMPLETED | OUTPATIENT
Start: 2024-12-03 | End: 2024-12-03

## 2024-12-03 RX ORDER — OXYCODONE HYDROCHLORIDE 30 MG/1
5 TABLET ORAL EVERY 4 HOURS
Refills: 0 | Status: DISCONTINUED | OUTPATIENT
Start: 2024-12-03 | End: 2024-12-03

## 2024-12-03 RX ORDER — 0.9 % SODIUM CHLORIDE 0.9 %
1000 INTRAVENOUS SOLUTION INTRAVENOUS
Refills: 0 | Status: DISCONTINUED | OUTPATIENT
Start: 2024-12-03 | End: 2024-12-06

## 2024-12-03 RX ORDER — HYDROMORPHONE HYDROCHLORIDE 2 MG/1
0.5 TABLET ORAL EVERY 6 HOURS
Refills: 0 | Status: DISCONTINUED | OUTPATIENT
Start: 2024-12-03 | End: 2024-12-03

## 2024-12-03 RX ORDER — LEVETIRACETAM 1000 MG/1
1000 TABLET ORAL EVERY 12 HOURS
Refills: 0 | Status: DISCONTINUED | OUTPATIENT
Start: 2024-12-04 | End: 2024-12-05

## 2024-12-03 RX ORDER — CHOLECALCIFEROL (VITAMIN D3) 10MCG/0.25
1 DROPS ORAL
Refills: 0 | DISCHARGE

## 2024-12-03 RX ORDER — LEVETIRACETAM 1000 MG/1
500 TABLET ORAL EVERY 12 HOURS
Refills: 0 | Status: DISCONTINUED | OUTPATIENT
Start: 2024-12-03 | End: 2024-12-03

## 2024-12-03 RX ORDER — ONDANSETRON HYDROCHLORIDE 4 MG/1
4 TABLET, FILM COATED ORAL EVERY 6 HOURS
Refills: 0 | Status: DISCONTINUED | OUTPATIENT
Start: 2024-12-03 | End: 2024-12-08

## 2024-12-03 RX ORDER — SODIUM CHLORIDE 9 MG/ML
1000 INJECTION, SOLUTION INTRAMUSCULAR; INTRAVENOUS; SUBCUTANEOUS
Refills: 0 | Status: DISCONTINUED | OUTPATIENT
Start: 2024-12-03 | End: 2024-12-03

## 2024-12-03 RX ORDER — POVIDONE-IODINE 7.5 %
1 SPONGE TOPICAL ONCE
Refills: 0 | Status: COMPLETED | OUTPATIENT
Start: 2024-12-03 | End: 2024-12-03

## 2024-12-03 RX ORDER — ACETAMINOPHEN 500MG 500 MG/1
650 TABLET, COATED ORAL EVERY 6 HOURS
Refills: 0 | Status: DISCONTINUED | OUTPATIENT
Start: 2024-12-03 | End: 2024-12-04

## 2024-12-03 RX ORDER — APREPITANT 40 MG/1
40 CAPSULE ORAL ONCE
Refills: 0 | Status: COMPLETED | OUTPATIENT
Start: 2024-12-03 | End: 2024-12-03

## 2024-12-03 RX ORDER — ACETAMINOPHEN 500MG 500 MG/1
1000 TABLET, COATED ORAL ONCE
Refills: 0 | Status: COMPLETED | OUTPATIENT
Start: 2024-12-03 | End: 2024-12-03

## 2024-12-03 RX ORDER — DEXAMETHASONE 1.5 MG/1
4 TABLET ORAL EVERY 6 HOURS
Refills: 0 | Status: COMPLETED | OUTPATIENT
Start: 2024-12-03 | End: 2024-12-05

## 2024-12-03 RX ORDER — CHLORHEXIDINE GLUCONATE 1.2 MG/ML
1 RINSE ORAL ONCE
Refills: 0 | Status: COMPLETED | OUTPATIENT
Start: 2024-12-03 | End: 2024-12-03

## 2024-12-03 RX ORDER — HYDRALAZINE HYDROCHLORIDE 10 MG/1
10 TABLET ORAL
Refills: 0 | Status: DISCONTINUED | OUTPATIENT
Start: 2024-12-03 | End: 2024-12-03

## 2024-12-03 RX ORDER — INDOCYANINE GREEN AND WATER 25 MG
25 KIT INJECTION ONCE
Refills: 0 | Status: DISCONTINUED | OUTPATIENT
Start: 2024-12-03 | End: 2024-12-03

## 2024-12-03 RX ORDER — SENNOSIDES 8.6 MG
2 TABLET ORAL AT BEDTIME
Refills: 0 | Status: DISCONTINUED | OUTPATIENT
Start: 2024-12-03 | End: 2024-12-08

## 2024-12-03 RX ADMIN — CHLORHEXIDINE GLUCONATE 1 APPLICATION(S): 1.2 RINSE ORAL at 11:46

## 2024-12-03 RX ADMIN — Medication 100 MILLIGRAM(S): at 22:27

## 2024-12-03 RX ADMIN — ACETAMINOPHEN 500MG 1000 MILLIGRAM(S): 500 TABLET, COATED ORAL at 11:46

## 2024-12-03 RX ADMIN — Medication 1 APPLICATION(S): at 11:46

## 2024-12-03 RX ADMIN — IPRATROPIUM BROMIDE AND ALBUTEROL SULFATE 3 MILLILITER(S): 2.5; .5 SOLUTION RESPIRATORY (INHALATION) at 23:28

## 2024-12-03 RX ADMIN — ACETAMINOPHEN 500MG 1000 MILLIGRAM(S): 500 TABLET, COATED ORAL at 22:47

## 2024-12-03 RX ADMIN — APREPITANT 40 MILLIGRAM(S): 40 CAPSULE ORAL at 11:46

## 2024-12-03 RX ADMIN — LEVETIRACETAM 400 MILLIGRAM(S): 1000 TABLET ORAL at 22:41

## 2024-12-03 RX ADMIN — Medication 2: at 22:28

## 2024-12-03 RX ADMIN — ACETAMINOPHEN 500MG 400 MILLIGRAM(S): 500 TABLET, COATED ORAL at 22:37

## 2024-12-03 RX ADMIN — DEXAMETHASONE 4 MILLIGRAM(S): 1.5 TABLET ORAL at 22:28

## 2024-12-03 NOTE — BRIEF OPERATIVE NOTE - OPERATION/FINDINGS
Under general anesthesia, an intra operative angiogram was performed using ultrasound guided access and micropuncture kit, a 5 fr sheath was inserted into the right CFA.  The right CFA and left ICA were injected and studied.    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  Full report to follow.  Patient remained hemodynamically stable, no complications.  Right groin/vasc access site: Closed with vascade and manual compression, hemostasis achieved, no hematoma.  Patient remained in OR for completion of operation.  Above d/w: Dr Echeverria  
Left craniotomy for clipping of aneurysm

## 2024-12-03 NOTE — PRE-ANESTHESIA EVALUATION ADULT - NSANTHDISPORD_GEN_ALL_CORE
[facility]     [facility] [address]     [place] [address]    Phone:  [phone]       Thank You for choosing us for your health care visit. We are glad to serve you and happy to provide you with this summary of your visit. Please help us to ensure we have accurate records. If you find anything that needs to be changed, please let our staff know as soon as possible.          Your Demographic Information     Patient Name Sex     Scarlet Goldstein Female 1943       Ethnic Group Patient Race    Not of  or  Origin White      Your Visit Details     Date & Time Provider Department    2017 10:30 AM Chip Prakash MD [facility]      Your Upcoming Appointment*(Max 10)       9:40 AM CDT   MAMM SCREENING with [facility] GIOVANNA MAMMO 2   [facility] ([facility])    [address] [place] [id number]   [phone]            2017 11:15 AM CST   Follow-up Visit with Brennen Mazariegos MD   [facility] ([facility])    [address] [place] [id number]   [phone]             10:00 AM CST   Lab Visit with [facility] LAB   [facility] ([facility])    [address] [place] [address]   [phone]             10:30 AM CST   Follow-up Visit with Jag Morrell MD   [facility] ([facility])    [address] [place] [address]   [phone]            2017 10:00 AM CST   Office Visit with Chip Prakash MD   [facility] ([facility])     [facility] [address]  [place] [address]   [phone]             10:00 AM CST   Follow-up Visit with Efrain BYRD  ICU MD Ed   [facility]    [address] [place] [place] [address] [phone]              Your Vitals Were     BP Pulse Resp Height Weight SpO2    118/78 (BP Location: [facility], Patient Position: Sitting) 91 16 5' 3.75\" (1.619 m) 227 lb (103 kg) 97%    BMI Smoking Status                39.27 kg/m2 Former Smoker          Medications Prescribed or Re-Ordered Today     None      Your Current Medications Are        Disp Refills Start End    furosemide (LASIX) 20 MG tablet 30 tablet 2 4/25/2017     Sig: TAKE ONE TABLET BY MOUTH ONCE DAILY    Class: Eprescribe    methylPREDNISolone (MEDROL DOSEPAK) 4 MG tablet 21 tablet 0 4/25/2017     Sig: follow package directions    Class: Eprescribe    lovastatin (MEVACOR) 40 MG tablet 30 tablet 1 4/21/2017     Sig - Route: Take 1 tablet by mouth nightly. - Oral    Class: Eprescribe    ranitidine (ZANTAC) 150 MG tablet 180 tablet 1 3/31/2017     Sig - Route: Take 1 tablet by mouth 2 times daily. - Oral    Class: Eprescribe    meloxicam (MOBIC) 15 MG tablet 30 tablet 3 3/31/2017     Sig - Route: Take 1 tablet by mouth daily. - Oral    Class: Eprescribe    potassium chloride (KLOR-CON M20) 20 MEQ CR tablet 90 tablet 1 3/31/2017     Sig - Route: Take 1 tablet by mouth daily. - Oral    Class: Eprescribe    gabapentin (NEURONTIN) 300 MG capsule 30 capsule 5 1/26/2017     Sig - Route: Take 1 capsule by mouth nightly. Take 2-3 hrs prior to bedtime - Oral    Class: Eprescribe    losartan (COZAAR) 25 MG tablet 30 tablet 5 1/19/2017     Sig: TAKE ONE-HALF TABLET BY MOUTH ONCE DAILY    Class: Eprescribe    HYDROcodone-acetaminophen (NORCO)  MG per tablet 120 tablet 0 1/9/2017     Sig - Route: Take 1 tablet by mouth every 6 hours as needed for Pain. - Oral    metoPROLOL (LOPRESSOR) 25 MG tablet 60 tablet 11 11/29/2016     Sig: TAKE ONE TABLET BY MOUTH EVERY 12 HOURS    Class: Eprescribe    vitamin - therapeutic multivitamins w/minerals  (CENTRUM SILVER,THERA-M) Tab        Sig - Route: Take 1 tablet by mouth daily. - Oral    Class: Historical Med    budesonide-formoterol (SYMBICORT) 160-4.5 MCG/ACT inhaler 1 Inhaler 1 8/2/2016     Sig - Route: Inhale 2 puffs into the lungs 2 times daily. - Inhalation    Class: Sample    oxybutynin (DITROPAN) 5 MG tablet 90 tablet 11 6/23/2016     Sig - Route: Take 1 tablet by mouth 3 times daily. Patient to adjust slowly.   Start with one pill daily and increase based on symptoms and side effects. - Oral    Class: Eprescribe    diclofenac (VOLTAREN) 1 % gel 300 g 2 8/3/2015     Sig - Route: Apply 2 g topically as needed (pain). Apply to the affected area BID. - Topical    Class: Eprescribe    aspirin 81 MG tablet        Sig - Route: Take 81 mg by mouth daily. - Oral    Class: Historical Med    loratadine (CLARITIN) 10 MG tablet   1/23/2012     Sig - Route: Take 10 mg by mouth daily. - Oral    Class: Historical Med    docusate sodium/sennosides (SENNA-S) 8.6-50 MG per tablet   1/23/2012     Sig - Route: Take 2 tablets by mouth At bedtime. - Oral    Class: Historical Med      Allergies     No Known Allergies      Immunizations History as of 5/24/2017     Name Date    HERPES ZOSTER SHINGLES 11/15/2012    INFLUENZA QUADRIVALENT 10/15/2015    Influenza 10/11/2013, 9/20/2012, 10/4/2011    Influenza High Dose Pres Free 10/10/2016    Influenza Quadrivalent Preservative Free 10/22/2014 11:56 AM    Pneumococcal Conjugate 13 Valent 7/14/2016    Pneumococcal Polysaccharide Adult 9/20/2010    Td:Adult type tetanus/diphtheria 4/20/2011      Problem List as of 5/24/2017     Arthritis    Asthma    S/P total hip arthroplasty    DDD (degenerative disc disease), lumbar    Urinary urgency    Urgency incontinence    Hx of rheumatic fever    Esophageal reflux    History of total knee replacement    DELEON (dyspnea on exertion)    Renal mass, left    Secondary hypertension    Malignant neoplasm of left kidney (CMS/HCC)            Patient  Instructions     None

## 2024-12-03 NOTE — H&P ADULT - HISTORY OF PRESENT ILLNESS
Ms. Ritter is a 43yo female patient, who had in the past two episodes of transient right sided tingling and numbness (first episode one year ago and second episode 6 months ago). Imaging workup showed two incidental bilateral ICA aneurysms. The left ICA aneurysm is a 4mm intradural paraopthalmic aneurysm, and the second one is a right, probably extradural, 5.8mm paraopthalmic aneurysm. The initial plan was to treat the left intradural aneurysm with a flow diverter, however the patient suffered heavy menstruation bleeding requiring hospitalization and transfusion while beeing on Aspirin and Plavix (for Stroke therapy). The dual antiplatelet regimen was then stopped. The case was discussed in our interdisciplinary vascular board, and the recommendation was made for surgical clipping of the left intradural ICA aneurysm.

## 2024-12-03 NOTE — H&P ADULT - REASON FOR ADMISSION
45 yo female patient presenting for an elective left pterional craniotomy and clipping of an incidental left paraopthalmic aneurysm of 4mm.

## 2024-12-03 NOTE — CONSULT NOTE ADULT - SUBJECTIVE AND OBJECTIVE BOX
**STROKE CODE CONSULT NOTE**    Last known well time: 0920    HPI: 44y Female with hx of asthma and bilateral ICA aneurysm presents to [facility] for elective clipping of 4mm left paraophthalmic aneurysm. Post op at 920pm patient was A&Ox3 moving extremities and participating with exam. Around 945pm RN noted patient was lethargic, requiring deep noxious stimulus to arouse, not opening her eyes, RUE 0/5, RLE trace withdrawal, left side 4/5. Stroke code was called. NIHSS 21, patient largely non focal on exam, was noted to have some spontaneous LUE movement at time of code. CTH with subdural hematoma anterior to terence, compression of visualized upper cervical cord with surrounding hyperdense material s/f SDH, no acute infarct. After CT scans, patient was more arousable but still lethargic, asking for water, able to stick her tongue out and lift up arms to commands.     T(C): 36.7 (12-04-24 @ 00:29), Max: 36.9 (12-03-24 @ 21:30)  HR: 54 (12-04-24 @ 03:00) (54 - 79)  BP: 123/69 (12-04-24 @ 03:00) (110/71 - 136/70)  RR: 34 (12-04-24 @ 03:00) (16 - 35)  SpO2: 99% (12-04-24 @ 03:00) (93% - 100%)    PAST MEDICAL & SURGICAL HISTORY:  Asthma      Dust allergy      Gastritis      Brain aneurysm      H/O endoscopy      FAMILY HISTORY:  Family history of prostate cancer (Father)    SOCIAL HISTORY: unable to obtain     ROS:   unable to obtain    MEDICATIONS  (STANDING):  albuterol/ipratropium for Nebulization 3 milliLiter(s) Nebulizer every 6 hours  atorvastatin 20 milliGRAM(s) Oral at bedtime  ceFAZolin   IVPB 2000 milliGRAM(s) IV Intermittent every 8 hours  dexAMETHasone  Injectable 4 milliGRAM(s) IV Push every 6 hours  insulin lispro (ADMELOG) corrective regimen sliding scale   SubCutaneous Before meals and at bedtime  levETIRAcetam 1000 milliGRAM(s) Oral every 12 hours  multiple electrolytes Injection Type 1 1000 milliLiter(s) (50 mL/Hr) IV Continuous <Continuous>  pantoprazole  Injectable 40 milliGRAM(s) IV Push daily  polyethylene glycol 3350 17 Gram(s) Oral daily  senna 2 Tablet(s) Oral at bedtime  sodium bicarbonate 650 milliGRAM(s) Oral once    MEDICATIONS  (PRN):  acetaminophen     Tablet .. 650 milliGRAM(s) Oral every 6 hours PRN Mild Pain (1 - 3)  albuterol    90 MICROgram(s) HFA Inhaler 2 Puff(s) Inhalation every 6 hours PRN for shortness of breath and/or wheezing  ondansetron Injectable 4 milliGRAM(s) IV Push every 6 hours PRN Nausea and/or Vomiting  traMADol 25 milliGRAM(s) Oral every 6 hours PRN Severe Pain (7 - 10)    Allergies    No Known Allergies    Intolerances      Vital Signs Last 24 Hrs  T(C): 36.7 (04 Dec 2024 00:29), Max: 36.9 (03 Dec 2024 21:30)  T(F): 98 (04 Dec 2024 00:29), Max: 98.5 (03 Dec 2024 21:30)  HR: 54 (04 Dec 2024 03:00) (54 - 79)  BP: 123/69 (04 Dec 2024 03:00) (110/71 - 136/70)  BP(mean): 90 (04 Dec 2024 03:00) (86 - 98)  RR: 34 (04 Dec 2024 03:00) (16 - 35)  SpO2: 99% (04 Dec 2024 03:00) (93% - 100%)    Parameters below as of 04 Dec 2024 03:00  Patient On (Oxygen Delivery Method): nasal cannula, high flow  O2 Flow (L/min): 50  O2 Concentration (%): 40    Physical exam:    Neurologic: at time of code   -Mental status: Obtunded, not opening eyes, not answering orientation questions. No verbal output. Does not follow commands.   -Cranial nerves:   II: Blink to threat intact   III, IV, VI: Does not track, midline gaze, pupils equal and reactive to light  VII: Face is symmetric with normal eye closure and smile  Motor/Sensory: localizing to noxious x 4     NIHSS: 21    Fingerstick Blood Glucose: CAPILLARY BLOOD GLUCOSE      POCT Blood Glucose.: 194 mg/dL (03 Dec 2024 22:03)    LABS:                        10.8   12.24 )-----------( 281      ( 03 Dec 2024 23:11 )             33.9     12-03    133[L]  |  104  |  10  ----------------------------<  195[H]  3.6   |  18[L]  |  0.44[L]    Ca    7.5[L]      03 Dec 2024 23:11  Phos  2.9     12-03  Mg     1.8     12-03    TPro  6.0  /  Alb  3.6  /  TBili  0.5  /  DBili  x   /  AST  19  /  ALT  15  /  AlkPhos  69  12-03    PT/INR - ( 03 Dec 2024 21:48 )   PT: 11.8 sec;   INR: 1.03          PTT - ( 03 Dec 2024 21:48 )  PTT:27.1 sec      Urinalysis Basic - ( 04 Dec 2024 02:44 )    Color: Yellow / Appearance: Clear / SG: >1.030 / pH: x  Gluc: x / Ketone: Trace mg/dL  / Bili: Negative / Urobili: 0.2 mg/dL   Blood: x / Protein: 30 mg/dL / Nitrite: Negative   Leuk Esterase: Negative / RBC: x / WBC x   Sq Epi: x / Non Sq Epi: x / Bacteria: x        RADIOLOGY & ADDITIONAL STUDIES:    < from: CT Brain Stroke Protocol (12.03.24 @ 22:24) >    IMPRESSION:  1.  No acute transcortical infarction.  2.  Small subdural hematoma anterior to the terence.  3.  Compression of thevisualized upper cervical cord with surrounding   hyperdense material suspicious for subdural hemorrhage.  4.  Diffuse cerebral sulcal effacement and decreased caliber of the   ventricles. 1 mm rightward midline shift.  5.  Left greater than right frontal pneumocephalus. Small amount of   extra-axial hemorrhage over the left frontal lobe adjacent to the   pneumocephalus.    < end of copied text >    CT PERFUSION:  Technical limitations: None.    Core infarction: 0 ml  Penumbra / tissue at risk for active ischemia: 0 ml    CTA NECK:  No evidence of significant stenosis or occlusion.    CTA HEAD:  No large vessel occlusion, significant stenosis.  Right internal carotid artery clinoid segmentaneurysm measures 5.3 x 4.7   x 4.1 mm.  Postoperative changes.    < end of copied text >    -----------------------------------------------------------------------------------------------------------------  IV-tenecteplase (Y/N):   N                             Bolus time:    Tenecteplase Dose Verification w/ RN:  Reason IV-tenecteplase not given: post op

## 2024-12-03 NOTE — PROGRESS NOTE ADULT - SUBJECTIVE AND OBJECTIVE BOX
NEUROSURGERY POST OP NOTE:    POD# 0 S/P left pteronial craniotomy for aneurysm clipping with intra-op angiogram showing complete obliteration    S: Seen and examined in NSCU. Reports incisional pain, improving with IV tylenol. Denies HA, N/V, chest pain, shortness of breath, new weakness or numbness.       T(C): 36.7 (12-04-24 @ 00:29), Max: 36.9 (12-03-24 @ 21:30)  HR: 56 (12-04-24 @ 01:41) (56 - 79)  BP: 134/74 (12-04-24 @ 00:00) (110/71 - 134/74)  RR: 21 (12-04-24 @ 01:41) (16 - 25)  SpO2: 100% (12-04-24 @ 01:41) (93% - 100%)      12-03-24 @ 07:01  -  12-04-24 @ 02:44  --------------------------------------------------------  IN: 350 mL / OUT: 1000 mL / NET: -650 mL        acetaminophen     Tablet .. 650 milliGRAM(s) Oral every 6 hours PRN  albuterol    90 MICROgram(s) HFA Inhaler 2 Puff(s) Inhalation every 6 hours PRN  albuterol/ipratropium for Nebulization 3 milliLiter(s) Nebulizer every 6 hours  atorvastatin 20 milliGRAM(s) Oral at bedtime  ceFAZolin   IVPB 2000 milliGRAM(s) IV Intermittent every 8 hours  dexAMETHasone  Injectable 4 milliGRAM(s) IV Push every 6 hours  insulin lispro (ADMELOG) corrective regimen sliding scale   SubCutaneous Before meals and at bedtime  levETIRAcetam 1000 milliGRAM(s) Oral every 12 hours  multiple electrolytes Injection Type 1 1000 milliLiter(s) IV Continuous <Continuous>  ondansetron Injectable 4 milliGRAM(s) IV Push every 6 hours PRN  pantoprazole  Injectable 40 milliGRAM(s) IV Push daily  polyethylene glycol 3350 17 Gram(s) Oral daily  senna 2 Tablet(s) Oral at bedtime  sodium bicarbonate 650 milliGRAM(s) Oral once  traMADol 25 milliGRAM(s) Oral every 6 hours PRN      RADIOLOGY:   CTH in AM    Exam:  General: NAD, pt is lying flat in bed, on NC   HEENT: PERRL 3mm briskly reactive, EOMI b/l, non-compliant with visual field testing at this time; face symmetric, tongue midline, neck FROM  Cardiovascular: RRR, S1, S2  Respiratory: non-labored breathing on RA, chest rise symmetric  GI: abd soft, NTND   Neuro: A&Ox3, No aphasia, speech clear, no pronator drift. Follows commands.  B/l UE 4/5, LE spontaneous 2/5. Pending further re-evaluation. Sensation grossly intact  Extremities: distal pulses 2+ x4  Wound/incision: right pteronial crani incision with telfa dressing stapled in place; right carotid incision dressing c/d/i, neck soft w/o hematoma or ecchymosis; SG CAMRON x1  Drain: SG JPx1     Assessment: 45 yo female patient with PMH of HLD, anxiety, TIAs, bilateral ICA aneurysms found incidentally on TIA w/u, presenting for elective L ICA aneurysm clipping. Now s/p L crani L paraopthalmic ICA aneurysm clipping with intra-operative angiogram showing complete obliteration (12/3).      Plan:  Neuro:   - neuro/vitals q1h  - pain control: tylenol 650mg q6 standing, tramadol 25mg q6 prn   - postop CTH in AM   - seizure ppx: keppra 500mg bid   - optic nerve edema: dex 4q6 x 48 hours   - s/p left neck incision intra-op cervical ICA control, monitor neck for s/s hematoma    Cards:   - -140  - HLD: cont home lipitor 20mg     Pulm:   - NC prn  - hx asthma: cont home albuterol prn    GI:   - NPO   - PPI while on steroids  - bowel regimen    Renal:   - IVF while NPO  - +marshall     Heme:   - h/h stable  - DVT ppx: SCDs, holding SQL post op     ID:   - afebrile  - post op ancef    Endo:   - f/u a1c, ISS while on steroids    Dispo: ICU, full code, dispo pending    D/w Dr. Espinosa, Dr. Bryant NEUROSURGERY POST OP NOTE:    POD# 0 S/P left pterional craniotomy for aneurysm clipping with intra-op angiogram showing complete obliteration    S: Seen and examined in NSCU. Reports incisional pain, improving with IV tylenol. Denies HA, N/V, chest pain, shortness of breath, new weakness or numbness.       T(C): 36.7 (12-04-24 @ 00:29), Max: 36.9 (12-03-24 @ 21:30)  HR: 56 (12-04-24 @ 01:41) (56 - 79)  BP: 134/74 (12-04-24 @ 00:00) (110/71 - 134/74)  RR: 21 (12-04-24 @ 01:41) (16 - 25)  SpO2: 100% (12-04-24 @ 01:41) (93% - 100%)      12-03-24 @ 07:01  -  12-04-24 @ 02:44  --------------------------------------------------------  IN: 350 mL / OUT: 1000 mL / NET: -650 mL        acetaminophen     Tablet .. 650 milliGRAM(s) Oral every 6 hours PRN  albuterol    90 MICROgram(s) HFA Inhaler 2 Puff(s) Inhalation every 6 hours PRN  albuterol/ipratropium for Nebulization 3 milliLiter(s) Nebulizer every 6 hours  atorvastatin 20 milliGRAM(s) Oral at bedtime  ceFAZolin   IVPB 2000 milliGRAM(s) IV Intermittent every 8 hours  dexAMETHasone  Injectable 4 milliGRAM(s) IV Push every 6 hours  insulin lispro (ADMELOG) corrective regimen sliding scale   SubCutaneous Before meals and at bedtime  levETIRAcetam 1000 milliGRAM(s) Oral every 12 hours  multiple electrolytes Injection Type 1 1000 milliLiter(s) IV Continuous <Continuous>  ondansetron Injectable 4 milliGRAM(s) IV Push every 6 hours PRN  pantoprazole  Injectable 40 milliGRAM(s) IV Push daily  polyethylene glycol 3350 17 Gram(s) Oral daily  senna 2 Tablet(s) Oral at bedtime  sodium bicarbonate 650 milliGRAM(s) Oral once  traMADol 25 milliGRAM(s) Oral every 6 hours PRN      RADIOLOGY:   CTH in AM    Exam:  General: NAD, pt is lying flat in bed, on NC   HEENT: PERRL 3mm briskly reactive, EOMI b/l, non-compliant with visual field testing at this time; face symmetric, tongue midline, neck FROM  Cardiovascular: RRR, S1, S2  Respiratory: non-labored breathing on RA, chest rise symmetric  GI: abd soft, NTND   Neuro: A&Ox3, No aphasia, speech clear, no pronator drift. Follows commands.  B/l UE 4/5, LE spontaneous 2/5. Pending further re-evaluation. Sensation grossly intact  Extremities: b/l PT pulses 2+, DP pulses 1+   Wound/incision: right pterional crani incision with telfa dressing stapled in place; right carotid incision dressing c/d/i, neck soft w/o hematoma or ecchymosis; SG CAMRON x1  Drain: SG JPx1     Assessment: 45 yo female patient with PMH of HLD, anxiety, TIAs, bilateral ICA aneurysms found incidentally on TIA w/u, presenting for elective L ICA aneurysm clipping. Now s/p L crani L paraopthalmic ICA aneurysm clipping with intra-operative angiogram showing complete obliteration (12/3).      Plan:  Neuro:   - neuro/vitals q1h  - pain control: tylenol 650mg q6 standing, tramadol 25mg q6 prn   - postop CTH in AM   - seizure ppx: keppra 500mg bid   - optic nerve edema: dex 4q6 x 48 hours   - s/p left neck incision intra-op cervical ICA control, monitor neck for s/s hematoma    Cards:   - -140  - HLD: cont home lipitor 20mg     Pulm:   - NC prn  - hx asthma: cont home albuterol prn    GI:   - NPO   - PPI while on steroids  - bowel regimen    Renal:   - IVF while NPO  - +marshall     Heme:   - h/h stable  - DVT ppx: SCDs, holding SQL post op     ID:   - afebrile  - post op ancef    Endo:   - f/u a1c, ISS while on steroids    Dispo: ICU, full code, dispo pending    D/w Dr. Espinosa, Dr. Bryant

## 2024-12-03 NOTE — PROGRESS NOTE ADULT - ASSESSMENT
ASSESSMENT/PLAN:     s/p Lt pterional crani for Lt parophthalmic aneurysm clipping w/ intra-op angio showing complete obliteration of aneurysm      NEURO:  s/p Lt crani for Lt parophthalmic aneurysm  Untreated Rt ICA extra-dural aneurysm   - Neurochecks q1h  - CT Head in AM  - levetiracetam 500mg bid for sz ppx per nsg  - dex [id number] per nsg for CNII manipulation intra-op  - Surgical drains per NSGY  - Pain control  - Activity: bed rest for now for groin checks    PULM:  - on RA  - Incentive spirometry   - mobilize as tolerated  - Aspiration Precautions    CV:  - -140mmHg  - d/c a-line in AM  - ekg    RENAL:  - Fluids: IVF until good PO intake  - trend renal function  - d/c marshall in AM    GI:  - Diet: Dysphagia screen and then advance diet as tolerated  - GI prophylaxis: ppi while on ccs  - Bowel regimen standing    ENDO:   - Goal euglycemia (-180)    HEME/ONC:  pt was previously on dual antiplatelet therapy for stroke therapy c/b heavy menstruation requiring hospitalization   - monitor H/H    VTE prophylaxis   - SCDs   - hold chemoprophylaxis due to: fresh post op      ID:  - Elli-op antibiotics      Dispo: NSICU   ASSESSMENT/PLAN:     s/p Lt pterional crani for Lt parophthalmic aneurysm clipping w/ intra-op angio showing complete obliteration of aneurysm      NEURO:  s/p Lt crani for Lt parophthalmic aneurysm  Untreated Rt ICA extra-dural aneurysm   - Neurochecks q1h  - CT Head in AM, f/u imaging  - levetiracetam 1g bid for ?sz, vEEG  - dex [id number] per nsg for CNII manipulation intra-op  - Surgical drains per NSGY  - Pain control  - Activity: bed rest for now for groin checks  - AMS: delayed anesthesia emergence vs. hypoxemia vs. seizure     PULM:  hx of asthma  - on HFNC 40/40, low threshold to reintubate  - Incentive spirometry   - mobilize as tolerated  - Aspiration Precautions  - nebs  - CXR, repeat ABG, NAGMA  - air near Lt larynx?, monitor    CV:  - -140mmHg  - d/c a-line in AM  - ekg, trop  - tte    RENAL:  - Fluids: IVF plsamalyte until good PO intake  - trend renal function  - d/c marshall in AM  - NAGMA, hyponatremia corrected 134, ulytes, repeat bmp, cpk    GI:  - Diet: NPO for AMS and risk of reinubtation  - GI prophylaxis: ppi while on ccs  - Bowel regimen standing  - ammonia, lfts    ENDO:   - Goal euglycemia (-180)    HEME/ONC:  pt was previously on dual antiplatelet therapy for stroke therapy c/b heavy menstruation requiring hospitalization   - monitor H/H, repeat cbc    VTE prophylaxis   - SCDs   - hold chemoprophylaxis due to: fresh post op      ID:  - Elli-op antibiotics      Dispo: NSICU

## 2024-12-03 NOTE — H&P ADULT - ASSESSMENT
45 yo female patient with PMH of vaginal bleeding, anxiety, TIAs, bilateral ICA aneurysms, presenting for an elective left pterional craniotomy and clipping of an incidental left paraopthalmic aneurysm of 4mm.    Plan:  - NPO  - Perioperative Antibiotics  - SCDs  - Postop to Neuro-ICU

## 2024-12-03 NOTE — STROKE CODE NOTE - PATIENT LAST KNOWN WELL_DATE TIME
"Patient roomed in ED with both parents present.  Parents explained the reason for visit and their concerns regarding a \"lesion\" that was not observed on previous visit.  " 03-Dec-2024

## 2024-12-03 NOTE — BRIEF OPERATIVE NOTE - NSICDXBRIEFPREOP_GEN_ALL_CORE_FT
PRE-OP DIAGNOSIS:  Intracranial aneurysm 03-Dec-2024 15:30:59  Dolores Oneill  
PRE-OP DIAGNOSIS:  Intracranial aneurysm 03-Dec-2024 15:30:59  Dolores Oneill

## 2024-12-03 NOTE — BRIEF OPERATIVE NOTE - NSICDXBRIEFPROCEDURE_GEN_ALL_CORE_FT
PROCEDURES:  Angiogram, cerebral, unilateral 04-Dec-2024 07:07:50 Intra operative angiogram Bert Rosales  
PROCEDURES:  Craniotomy for aneurysm repair 03-Dec-2024 15:30:49  Dolores Oneill

## 2024-12-03 NOTE — BRIEF OPERATIVE NOTE - COMMENTS
Left craniotomy for clipping of left ophthalmic aneurysm  - intraop angiogram with right groin access, intraop pulses strong

## 2024-12-03 NOTE — PROGRESS NOTE ADULT - SUBJECTIVE AND OBJECTIVE BOX
NSCU Progress Note    Assessment/Hospital Course:     45yo female patient, recently on dual antiplatelet therapy for stroke therapy c/b heavy menstrual cycle requiring hospitalization for transfusions, who had in the past two episodes of transient right sided tingling and numbness (first episode one year ago and second episode 6 months ago). Imaging workup showed two incidental bilateral ICA aneurysms. The left ICA aneurysm is a 4mm intradural paraopthalmic aneurysm, and the second one is a right, probably extradural, 5.8mm paraopthalmic aneurys now s/p Lt crani for ICA aneurysm clipping.    24 Hour Events/Subjective:  - POD0 s/p Lt pterional crani for Lt parophthalmic aneurysm clipping w/ intra-op angio showing complete obliteration of aneurysm      REVIEW OF SYSTEMS:  - negative except as above    VITALS:   - Reviewed    IMAGING/DATA:   - Reviewed          PHYSICAL EXAM:    General: calm  CVS: RRR  Pulm: CTAB  GI: Soft, NTND  Extremities: No LE Edema, no groin hematoma  Neuro: AOx3, PERRL, EOMI, facial symmetrical, no blurry vision however slow to wake up will reassess (able to read ID), fluent speech, motor 5/5 throughout, no PND, sensation in tact   NSCU Progress Note    Assessment/Hospital Course:     43yo female patient, recently on dual antiplatelet therapy for stroke therapy c/b heavy menstrual cycle requiring hospitalization for transfusions, who had in the past two episodes of transient right sided tingling and numbness (first episode one year ago and second episode 6 months ago). Imaging workup showed two incidental bilateral ICA aneurysms. The left ICA aneurysm is a 4mm intradural paraopthalmic aneurysm, and the second one is a right, probably extradural, 5.8mm paraopthalmic aneurys now s/p Lt crani for ICA aneurysm clipping.    24 Hour Events/Subjective:  - POD0 s/p Lt pterional crani for Lt parophthalmic aneurysm clipping w/ intra-op angio showing complete obliteration of aneurysm  - see event note for update      REVIEW OF SYSTEMS:  - negative except as above    VITALS:   - Reviewed    IMAGING/DATA:   - Reviewed          PHYSICAL EXAM:    General: calm  CVS: RRR  Pulm: CTAB  GI: Soft, NTND  Extremities: No LE Edema, no groin hematoma  Neuro: AOx3, PERRL, EOMI, facial symmetrical, no blurry vision however slow to wake up will reassess (able to read ID), fluent speech, motor 5/5 throughout, no PND, sensation in tact

## 2024-12-03 NOTE — PRE-ANESTHESIA EVALUATION ADULT - NSANTHPMHFT_GEN_ALL_CORE
45yo female patient, who had in the past two episodes of transient right sided tingling and numbness (first episode one year ago and second episode 6 months ago). Imaging workup showed two incidental bilateral ICA aneurysms. The left ICA aneurysm is a 4mm intradural paraopthalmic aneurysm, and the second one is a right, probably extradural, 5.8mm paraopthalmic aneurysm. The initial plan was to treat the left intradural aneurysm with a flow diverter, however the patient suffered heavy menstruation bleeding requiring hospitalization and transfusion while beeing on Aspirin and Plavix (for Stroke therapy). The dual antiplatelet regimen was then stopped.

## 2024-12-03 NOTE — PRE-ANESTHESIA EVALUATION ADULT - NSANTHTIREDRD_ENT_A_CORE
Chief Complaint   Patient presents with     Recheck Medication     6 week follow up     Kimberly Nissen, EMT at 2:35 PM on 10/19/2020     No

## 2024-12-04 LAB
A1C WITH ESTIMATED AVERAGE GLUCOSE RESULT: 6 % — HIGH (ref 4–5.6)
ALBUMIN SERPL ELPH-MCNC: 4 G/DL — SIGNIFICANT CHANGE UP (ref 3.3–5)
ALP SERPL-CCNC: 79 U/L — SIGNIFICANT CHANGE UP (ref 40–120)
ALT FLD-CCNC: 18 U/L — SIGNIFICANT CHANGE UP (ref 10–45)
ANION GAP SERPL CALC-SCNC: 12 MMOL/L — SIGNIFICANT CHANGE UP (ref 5–17)
ANION GAP SERPL CALC-SCNC: 15 MMOL/L — SIGNIFICANT CHANGE UP (ref 5–17)
APPEARANCE UR: CLEAR — SIGNIFICANT CHANGE UP
AST SERPL-CCNC: 24 U/L — SIGNIFICANT CHANGE UP (ref 10–40)
BASE EXCESS BLDA CALC-SCNC: -4.6 MMOL/L — LOW (ref -2–3)
BASE EXCESS BLDA CALC-SCNC: -6.3 MMOL/L — LOW (ref -2–3)
BILIRUB SERPL-MCNC: 0.9 MG/DL — SIGNIFICANT CHANGE UP (ref 0.2–1.2)
BILIRUB UR-MCNC: NEGATIVE — SIGNIFICANT CHANGE UP
BUN SERPL-MCNC: 10 MG/DL — SIGNIFICANT CHANGE UP (ref 7–23)
BUN SERPL-MCNC: 12 MG/DL — SIGNIFICANT CHANGE UP (ref 7–23)
CALCIUM SERPL-MCNC: 8.2 MG/DL — LOW (ref 8.4–10.5)
CALCIUM SERPL-MCNC: 8.6 MG/DL — SIGNIFICANT CHANGE UP (ref 8.4–10.5)
CHLORIDE SERPL-SCNC: 102 MMOL/L — SIGNIFICANT CHANGE UP (ref 96–108)
CHLORIDE SERPL-SCNC: 105 MMOL/L — SIGNIFICANT CHANGE UP (ref 96–108)
CO2 BLDA-SCNC: 19 MMOL/L — SIGNIFICANT CHANGE UP (ref 19–24)
CO2 BLDA-SCNC: 20 MMOL/L — SIGNIFICANT CHANGE UP (ref 19–24)
CO2 SERPL-SCNC: 19 MMOL/L — LOW (ref 22–31)
CO2 SERPL-SCNC: 20 MMOL/L — LOW (ref 22–31)
COLOR SPEC: YELLOW — SIGNIFICANT CHANGE UP
CREAT ?TM UR-MCNC: 80 MG/DL — SIGNIFICANT CHANGE UP
CREAT SERPL-MCNC: 0.42 MG/DL — LOW (ref 0.5–1.3)
CREAT SERPL-MCNC: 0.46 MG/DL — LOW (ref 0.5–1.3)
DIFF PNL FLD: NEGATIVE — SIGNIFICANT CHANGE UP
EGFR: 121 ML/MIN/1.73M2 — SIGNIFICANT CHANGE UP
EGFR: 124 ML/MIN/1.73M2 — SIGNIFICANT CHANGE UP
ESTIMATED AVERAGE GLUCOSE: 126 MG/DL — HIGH (ref 68–114)
GAS PNL BLDA: SIGNIFICANT CHANGE UP
GLUCOSE SERPL-MCNC: 137 MG/DL — HIGH (ref 70–99)
GLUCOSE SERPL-MCNC: 168 MG/DL — HIGH (ref 70–99)
GLUCOSE UR QL: 500 MG/DL
HCO3 BLDA-SCNC: 18 MMOL/L — LOW (ref 21–28)
HCO3 BLDA-SCNC: 19 MMOL/L — LOW (ref 21–28)
HCT VFR BLD CALC: 34.3 % — LOW (ref 34.5–45)
HCT VFR BLD CALC: 36.6 % — SIGNIFICANT CHANGE UP (ref 34.5–45)
HGB BLD-MCNC: 10.6 G/DL — LOW (ref 11.5–15.5)
HGB BLD-MCNC: 11.3 G/DL — LOW (ref 11.5–15.5)
KETONES UR-MCNC: ABNORMAL MG/DL
LACTATE SERPL-SCNC: 1.4 MMOL/L — SIGNIFICANT CHANGE UP (ref 0.5–2)
LEUKOCYTE ESTERASE UR-ACNC: NEGATIVE — SIGNIFICANT CHANGE UP
MAGNESIUM SERPL-MCNC: 2.4 MG/DL — SIGNIFICANT CHANGE UP (ref 1.6–2.6)
MAGNESIUM SERPL-MCNC: 2.5 MG/DL — SIGNIFICANT CHANGE UP (ref 1.6–2.6)
MCHC RBC-ENTMCNC: 24 PG — LOW (ref 27–34)
MCHC RBC-ENTMCNC: 24.4 PG — LOW (ref 27–34)
MCHC RBC-ENTMCNC: 30.9 G/DL — LOW (ref 32–36)
MCHC RBC-ENTMCNC: 30.9 G/DL — LOW (ref 32–36)
MCV RBC AUTO: 77.8 FL — LOW (ref 80–100)
MCV RBC AUTO: 79 FL — LOW (ref 80–100)
NITRITE UR-MCNC: NEGATIVE — SIGNIFICANT CHANGE UP
NRBC # BLD: 0 /100 WBCS — SIGNIFICANT CHANGE UP (ref 0–0)
NRBC # BLD: 0 /100 WBCS — SIGNIFICANT CHANGE UP (ref 0–0)
PCO2 BLDA: 32 MMHG — SIGNIFICANT CHANGE UP (ref 32–45)
PCO2 BLDA: 32 MMHG — SIGNIFICANT CHANGE UP (ref 32–45)
PH BLDA: 7.36 — SIGNIFICANT CHANGE UP (ref 7.35–7.45)
PH BLDA: 7.39 — SIGNIFICANT CHANGE UP (ref 7.35–7.45)
PH UR: 6 — SIGNIFICANT CHANGE UP (ref 5–8)
PHOSPHATE SERPL-MCNC: 3.5 MG/DL — SIGNIFICANT CHANGE UP (ref 2.5–4.5)
PHOSPHATE SERPL-MCNC: 3.7 MG/DL — SIGNIFICANT CHANGE UP (ref 2.5–4.5)
PLATELET # BLD AUTO: 361 K/UL — SIGNIFICANT CHANGE UP (ref 150–400)
PLATELET # BLD AUTO: 370 K/UL — SIGNIFICANT CHANGE UP (ref 150–400)
PO2 BLDA: 102 MMHG — SIGNIFICANT CHANGE UP (ref 83–108)
PO2 BLDA: 136 MMHG — HIGH (ref 83–108)
POTASSIUM SERPL-MCNC: 4 MMOL/L — SIGNIFICANT CHANGE UP (ref 3.5–5.3)
POTASSIUM SERPL-MCNC: 4.3 MMOL/L — SIGNIFICANT CHANGE UP (ref 3.5–5.3)
POTASSIUM SERPL-SCNC: 4 MMOL/L — SIGNIFICANT CHANGE UP (ref 3.5–5.3)
POTASSIUM SERPL-SCNC: 4.3 MMOL/L — SIGNIFICANT CHANGE UP (ref 3.5–5.3)
PROT SERPL-MCNC: 7 G/DL — SIGNIFICANT CHANGE UP (ref 6–8.3)
PROT UR-MCNC: 30 MG/DL
RBC # BLD: 4.41 M/UL — SIGNIFICANT CHANGE UP (ref 3.8–5.2)
RBC # BLD: 4.63 M/UL — SIGNIFICANT CHANGE UP (ref 3.8–5.2)
RBC # FLD: 21.3 % — HIGH (ref 10.3–14.5)
RBC # FLD: 21.8 % — HIGH (ref 10.3–14.5)
SAO2 % BLDA: 98.1 % — HIGH (ref 94–98)
SAO2 % BLDA: 99 % — HIGH (ref 94–98)
SODIUM SERPL-SCNC: 134 MMOL/L — LOW (ref 135–145)
SODIUM SERPL-SCNC: 139 MMOL/L — SIGNIFICANT CHANGE UP (ref 135–145)
SODIUM UR-SCNC: 154 MMOL/L — SIGNIFICANT CHANGE UP
SP GR SPEC: >1.03 — HIGH (ref 1–1.03)
UROBILINOGEN FLD QL: 0.2 MG/DL — SIGNIFICANT CHANGE UP (ref 0.2–1)
UUN UR-MCNC: 549 MG/DL — SIGNIFICANT CHANGE UP
WBC # BLD: 12.63 K/UL — HIGH (ref 3.8–10.5)
WBC # BLD: 18.18 K/UL — HIGH (ref 3.8–10.5)
WBC # FLD AUTO: 12.63 K/UL — HIGH (ref 3.8–10.5)
WBC # FLD AUTO: 18.18 K/UL — HIGH (ref 3.8–10.5)

## 2024-12-04 PROCEDURE — 99291 CRITICAL CARE FIRST HOUR: CPT

## 2024-12-04 RX ORDER — ACETAMINOPHEN 500MG 500 MG/1
1000 TABLET, COATED ORAL EVERY 8 HOURS
Refills: 0 | Status: DISCONTINUED | OUTPATIENT
Start: 2024-12-04 | End: 2024-12-04

## 2024-12-04 RX ORDER — SODIUM CHLORIDE 9 MG/ML
500 INJECTION, SOLUTION INTRAMUSCULAR; INTRAVENOUS; SUBCUTANEOUS ONCE
Refills: 0 | Status: COMPLETED | OUTPATIENT
Start: 2024-12-04 | End: 2024-12-04

## 2024-12-04 RX ORDER — POTASSIUM CHLORIDE 600 MG/1
40 TABLET, EXTENDED RELEASE ORAL ONCE
Refills: 0 | Status: COMPLETED | OUTPATIENT
Start: 2024-12-04 | End: 2024-12-04

## 2024-12-04 RX ORDER — TRAMADOL HYDROCHLORIDE 300 MG/1
25 CAPSULE ORAL EVERY 6 HOURS
Refills: 0 | Status: DISCONTINUED | OUTPATIENT
Start: 2024-12-04 | End: 2024-12-08

## 2024-12-04 RX ORDER — ACETAMINOPHEN 500MG 500 MG/1
1000 TABLET, COATED ORAL EVERY 8 HOURS
Refills: 0 | Status: COMPLETED | OUTPATIENT
Start: 2024-12-04 | End: 2024-12-05

## 2024-12-04 RX ORDER — CHLORHEXIDINE GLUCONATE 1.2 MG/ML
1 RINSE ORAL
Refills: 0 | Status: DISCONTINUED | OUTPATIENT
Start: 2024-12-04 | End: 2024-12-06

## 2024-12-04 RX ORDER — LANOLIN ALCOHOL/MO/W.PET/CERES
500 CREAM (GRAM) TOPICAL EVERY 8 HOURS
Refills: 0 | Status: COMPLETED | OUTPATIENT
Start: 2024-12-04 | End: 2024-12-07

## 2024-12-04 RX ORDER — ACETAMINOPHEN 500MG 500 MG/1
1000 TABLET, COATED ORAL ONCE
Refills: 0 | Status: COMPLETED | OUTPATIENT
Start: 2024-12-04 | End: 2024-12-04

## 2024-12-04 RX ADMIN — Medication 100 MILLIGRAM(S): at 05:02

## 2024-12-04 RX ADMIN — IPRATROPIUM BROMIDE AND ALBUTEROL SULFATE 3 MILLILITER(S): 2.5; .5 SOLUTION RESPIRATORY (INHALATION) at 11:32

## 2024-12-04 RX ADMIN — TRAMADOL HYDROCHLORIDE 25 MILLIGRAM(S): 300 CAPSULE ORAL at 12:15

## 2024-12-04 RX ADMIN — ACETAMINOPHEN 500MG 650 MILLIGRAM(S): 500 TABLET, COATED ORAL at 03:01

## 2024-12-04 RX ADMIN — DEXAMETHASONE 4 MILLIGRAM(S): 1.5 TABLET ORAL at 23:20

## 2024-12-04 RX ADMIN — ACETAMINOPHEN 500MG 400 MILLIGRAM(S): 500 TABLET, COATED ORAL at 04:54

## 2024-12-04 RX ADMIN — Medication 2: at 07:10

## 2024-12-04 RX ADMIN — ONDANSETRON HYDROCHLORIDE 4 MILLIGRAM(S): 4 TABLET, FILM COATED ORAL at 04:30

## 2024-12-04 RX ADMIN — Medication 50 MILLILITER(S): at 02:10

## 2024-12-04 RX ADMIN — Medication 25 GRAM(S): at 02:10

## 2024-12-04 RX ADMIN — ACETAMINOPHEN 500MG 400 MILLIGRAM(S): 500 TABLET, COATED ORAL at 14:21

## 2024-12-04 RX ADMIN — TRAMADOL HYDROCHLORIDE 25 MILLIGRAM(S): 300 CAPSULE ORAL at 11:43

## 2024-12-04 RX ADMIN — SODIUM BICARBONATE 650 MILLIGRAM(S): 84 INJECTION, SOLUTION INTRAVENOUS at 04:21

## 2024-12-04 RX ADMIN — SODIUM CHLORIDE 500 MILLILITER(S): 9 INJECTION, SOLUTION INTRAMUSCULAR; INTRAVENOUS; SUBCUTANEOUS at 22:39

## 2024-12-04 RX ADMIN — Medication 105 MILLIGRAM(S): at 21:14

## 2024-12-04 RX ADMIN — TRAMADOL HYDROCHLORIDE 25 MILLIGRAM(S): 300 CAPSULE ORAL at 02:42

## 2024-12-04 RX ADMIN — Medication 20 MILLIGRAM(S): at 21:13

## 2024-12-04 RX ADMIN — ACETAMINOPHEN 500MG 1000 MILLIGRAM(S): 500 TABLET, COATED ORAL at 05:07

## 2024-12-04 RX ADMIN — CHLORHEXIDINE GLUCONATE 1 APPLICATION(S): 1.2 RINSE ORAL at 06:27

## 2024-12-04 RX ADMIN — DEXAMETHASONE 4 MILLIGRAM(S): 1.5 TABLET ORAL at 18:18

## 2024-12-04 RX ADMIN — LEVETIRACETAM 1000 MILLIGRAM(S): 1000 TABLET ORAL at 05:02

## 2024-12-04 RX ADMIN — TRAMADOL HYDROCHLORIDE 25 MILLIGRAM(S): 300 CAPSULE ORAL at 18:18

## 2024-12-04 RX ADMIN — LEVETIRACETAM 1000 MILLIGRAM(S): 1000 TABLET ORAL at 18:18

## 2024-12-04 RX ADMIN — IPRATROPIUM BROMIDE AND ALBUTEROL SULFATE 3 MILLILITER(S): 2.5; .5 SOLUTION RESPIRATORY (INHALATION) at 16:08

## 2024-12-04 RX ADMIN — DEXAMETHASONE 4 MILLIGRAM(S): 1.5 TABLET ORAL at 05:02

## 2024-12-04 RX ADMIN — Medication 2: at 22:18

## 2024-12-04 RX ADMIN — ACETAMINOPHEN 500MG 1000 MILLIGRAM(S): 500 TABLET, COATED ORAL at 22:34

## 2024-12-04 RX ADMIN — ACETAMINOPHEN 500MG 650 MILLIGRAM(S): 500 TABLET, COATED ORAL at 03:11

## 2024-12-04 RX ADMIN — ACETAMINOPHEN 500MG 1000 MILLIGRAM(S): 500 TABLET, COATED ORAL at 14:50

## 2024-12-04 RX ADMIN — DEXAMETHASONE 4 MILLIGRAM(S): 1.5 TABLET ORAL at 12:42

## 2024-12-04 RX ADMIN — TRAMADOL HYDROCHLORIDE 25 MILLIGRAM(S): 300 CAPSULE ORAL at 02:10

## 2024-12-04 RX ADMIN — Medication 105 MILLIGRAM(S): at 14:21

## 2024-12-04 RX ADMIN — TRAMADOL HYDROCHLORIDE 25 MILLIGRAM(S): 300 CAPSULE ORAL at 18:50

## 2024-12-04 RX ADMIN — PANTOPRAZOLE SODIUM 40 MILLIGRAM(S): 40 TABLET, DELAYED RELEASE ORAL at 12:42

## 2024-12-04 RX ADMIN — IPRATROPIUM BROMIDE AND ALBUTEROL SULFATE 3 MILLILITER(S): 2.5; .5 SOLUTION RESPIRATORY (INHALATION) at 04:21

## 2024-12-04 RX ADMIN — POTASSIUM CHLORIDE 40 MILLIEQUIVALENT(S): 600 TABLET, EXTENDED RELEASE ORAL at 02:10

## 2024-12-04 RX ADMIN — ACETAMINOPHEN 500MG 1000 MILLIGRAM(S): 500 TABLET, COATED ORAL at 21:13

## 2024-12-04 NOTE — OCCUPATIONAL THERAPY INITIAL EVALUATION ADULT - ADDITIONAL COMMENTS
Pt lives in an apt with her  +2 FOS to enter. Pt was indep with ADLs and functional mobility without AD prior to admission. Pt has a tub shower +grab bars. Pt is R handed and wears glasses.

## 2024-12-04 NOTE — PROGRESS NOTE ADULT - SUBJECTIVE AND OBJECTIVE BOX
NEUROCRITICAL CARE EVENING NOTE    DAY EVENTS:    - persistent O2 requirements, HFNC 50/30      VITALS/IMAGING/DATA  - Reviewed    ALLERGIES:   - Reviewed      MEDICATIONS:  - Reviewed    Physical Exam:  Unchanged from day time, please refer to note.

## 2024-12-04 NOTE — OCCUPATIONAL THERAPY INITIAL EVALUATION ADULT - MODIFIED CLINICAL TEST OF SENSORY INTEGRATION IN BALANCE TEST
Pt sat EOB ~12 minutes with CGA, further mobility deferred 2/2 pt fatigued
[id number] Exp Problem Focused - Mod. Complex

## 2024-12-04 NOTE — PROGRESS NOTE ADULT - SUBJECTIVE AND OBJECTIVE BOX
S/Overnight events:    Pt received in ICU s/p left pterional crani for aneurysm clipping. Pt c/o incisional HA and given 1g IV tylenol. Pt AOx3, CASTRO, participating with exam. Approx 20-30 minutes later, pt acutely became lethargic w worsening exam, requiring deep noxious stimuli to w/d extremities and not opening eyes. Stroke code called. Imaging s/f SDH anterior to terence and compression of upper cervical cord c/f SDH. CTA head/neck negative. given 1g keppra stat. Pt exam improving, troponin sent, EKG, CXR, HFNC, ABGx2, urine lytes, rpt CBC/CMP. Given tramadol 25mg x1 with relief.     Hospital Course:   12/3: POD0 L crani L paraopthalmic ICA aneurysm clipping. Pt AOx3, CASTRO, participating with exam. 20 min later, pt newly lethargic, no longer participating w exam, requiring deep noxious stimuli for w/d. not opening eyes, stroke code called. CTH w small SDH anterior to terence and compression of upper cervical cord c/f SDH. CTA head/neck, CTP negative. HFNC. abg improved. keppra inc to 1g BID.   12/4: POD 1. c/f non-anion gap metabolic acidosis, s/p bicarb 650mg PO.     Vital Signs Last 24 Hrs  T(C): 36.7 (04 Dec 2024 00:29), Max: 36.9 (03 Dec 2024 21:30)  T(F): 98 (04 Dec 2024 00:29), Max: 98.5 (03 Dec 2024 21:30)  HR: 54 (04 Dec 2024 02:00) (54 - 79)  BP: 125/70 (04 Dec 2024 02:00) (110/71 - 136/70)  BP(mean): 92 (04 Dec 2024 02:00) (86 - 98)  RR: 35 (04 Dec 2024 02:00) (16 - 35)  SpO2: 100% (04 Dec 2024 02:00) (93% - 100%)    Parameters below as of 04 Dec 2024 02:00  Patient On (Oxygen Delivery Method): nasal cannula, high flow  O2 Flow (L/min): 50  O2 Concentration (%): 40    I&O's Detail    03 Dec 2024 07:01  -  04 Dec 2024 02:56  --------------------------------------------------------  IN:    IV PiggyBack: 200 mL    sodium chloride 0.9%: 150 mL  Total IN: 350 mL    OUT:    Bulb (mL): 150 mL    Indwelling Catheter - Urethral (mL): 850 mL    IV PiggyBack: 0 mL  Total OUT: 1000 mL    Total NET: -650 mL        I&O's Summary    03 Dec 2024 07:01  -  04 Dec 2024 02:56  --------------------------------------------------------  IN: 350 mL / OUT: 1000 mL / NET: -650 mL        PHYSICAL EXAM:  General: NAD, pt is sitting up in bed, on HFNC  HEENT: PERRL 4mm briskly reactive, EOMI b/l, (+) right eye VF intact, non-compliant with left eye VF testing; face symmetric, tongue midline, neck FROM  CV: RRR, S1, S2  Resp: breathing non-labored on HFNC, chest rise symmetric  GI: abd soft, NTND  Neuro: AOx3, no aphasia, speech clear, non-compliant with pronator drift testing. Follows commands.  B/l UE 4+/5, b/l LE HF 3/5, KF/KE with support at knee 4+, DF/PF 5/5. Sensation grossly intact.   Extremities: b/l DP pulses 1+, b/l PT pulses 2+   Wound/incision: left crani w telfa dressing stapled in place, c/d/i; right groin site w/o hematoma or ecchymosis, soft, dressing c/d/i  Drain: (+) marshall (+) SG JPx1    TUBES/LINES:  [] CVC  [x] A-line  [] Lumbar Drain  [] Ventriculostomy  [x] Marshall  [x] Other- SG CAMRON    DIET:  [x] NPO  [] Mechanical  [] Tube feeds    LABS:                        10.8   12.24 )-----------( 281      ( 03 Dec 2024 23:11 )             33.9     12-03    133[L]  |  104  |  10  ----------------------------<  195[H]  3.6   |  18[L]  |  0.44[L]    Ca    7.5[L]      03 Dec 2024 23:11  Phos  2.9     12-03  Mg     1.8     12-03    TPro  6.0  /  Alb  3.6  /  TBili  0.5  /  DBili  x   /  AST  19  /  ALT  15  /  AlkPhos  69  12-03    PT/INR - ( 03 Dec 2024 21:48 )   PT: 11.8 sec;   INR: 1.03          PTT - ( 03 Dec 2024 21:48 )  PTT:27.1 sec  Urinalysis Basic - ( 03 Dec 2024 23:11 )    Color: x / Appearance: x / SG: x / pH: x  Gluc: 195 mg/dL / Ketone: x  / Bili: x / Urobili: x   Blood: x / Protein: x / Nitrite: x   Leuk Esterase: x / RBC: x / WBC x   Sq Epi: x / Non Sq Epi: x / Bacteria: x          CAPILLARY BLOOD GLUCOSE      POCT Blood Glucose.: 194 mg/dL (03 Dec 2024 22:03)      Drug Levels: [] N/A    CSF Analysis: [] N/A      Allergies    No Known Allergies    Intolerances      MEDICATIONS:  Antibiotics:  ceFAZolin   IVPB 2000 milliGRAM(s) IV Intermittent every 8 hours    Neuro:  acetaminophen     Tablet .. 650 milliGRAM(s) Oral every 6 hours PRN  levETIRAcetam 1000 milliGRAM(s) Oral every 12 hours  ondansetron Injectable 4 milliGRAM(s) IV Push every 6 hours PRN  traMADol 25 milliGRAM(s) Oral every 6 hours PRN    Anticoagulation:    OTHER:  albuterol    90 MICROgram(s) HFA Inhaler 2 Puff(s) Inhalation every 6 hours PRN  albuterol/ipratropium for Nebulization 3 milliLiter(s) Nebulizer every 6 hours  atorvastatin 20 milliGRAM(s) Oral at bedtime  dexAMETHasone  Injectable 4 milliGRAM(s) IV Push every 6 hours  insulin lispro (ADMELOG) corrective regimen sliding scale   SubCutaneous Before meals and at bedtime  pantoprazole  Injectable 40 milliGRAM(s) IV Push daily  polyethylene glycol 3350 17 Gram(s) Oral daily  senna 2 Tablet(s) Oral at bedtime    IVF:  multiple electrolytes Injection Type 1 1000 milliLiter(s) IV Continuous <Continuous>  sodium bicarbonate 650 milliGRAM(s) Oral once    CULTURES:    RADIOLOGY & ADDITIONAL TESTS:  < from: CT Perfusion w/ Maps w/ IV Cont (12.03.24 @ 22:24) >    IMPRESSION:    CT PERFUSION:  Technical limitations: None.    Core infarction: 0 ml  Penumbra / tissue at risk for active ischemia: 0 ml    CTA NECK:  No evidence of significant stenosis or occlusion.    CTA HEAD:  No large vessel occlusion, significant stenosis.  Right internal carotid artery clinoid segmentaneurysm measures 5.3 x 4.7   x 4.1 mm.  Postoperative changes.      --- End of Report ---    < end of copied text >  < from: CT Brain Stroke Protocol (12.03.24 @ 22:24) >  IMPRESSION:  1.  No acute transcortical infarction.  2.  Small subdural hematoma anterior to the terence.  3.  Compression of thevisualized upper cervical cord with surrounding   hyperdense material suspicious for subdural hemorrhage.  4.  Diffuse cerebral sulcal effacement and decreased caliber of the   ventricles. 1 mm rightward midline shift.  5.  Left greater than right frontal pneumocephalus. Small amount of   extra-axial hemorrhage over the left frontal lobe adjacent to the   pneumocephalus.    Finding of no new acute transcortical infarction was discussed with JEAN Tejeda by Dr. Castillo on 12/3/2024 10:23 PM.  Updated findings were discussed with JEAN Tejeda by Dr. Castillo on   12/3/2024 11:01 PM    --- End of Report ---    < end of copied text >      ASSESSMENT:  43 yo female patient with PMH of HLD, anxiety, TIAs, bilateral ICA aneurysms found incidentally on TIA w/u, presenting for elective L ICA aneurysm clipping. Now s/p L crani L paraopthalmic ICA aneurysm clipping with intra-operative angiogram showing complete obliteration (12/3).    I72.0    Family history of prostate cancer (Father)    Handoff    No pertinent past medical history    Asthma    Dust allergy    Gastritis    Breast cancer metastasized to brain    Brain aneurysm    Brain aneurysm    Intracranial aneurysm    Intracranial aneurysm    Craniotomy for aneurysm repair    No significant past surgical history    H/O endoscopy    [email or web address]        PLAN:  Neuro:   - neuro/vitals q1h  - pain control: tylenol 650mg q6 standing, tramadol 25mg q6 prn   - postop CTH in AM   - seizure ppx: keppra 1g bid   - optic nerve edema: dex 4q6 x 48 hours   - CTH 12/3 w small SDH anterior to terence and compression of upper cervical cord c/f SDH  - CTA head/neck, CTP 12/3 negative  - s/p left neck incision intra-op cervical ICA control, monitor neck for s/s hematoma    Cards:   - -140  - HLD: cont home lipitor 20mg     Pulm:   - HFNC 50/40  - hx asthma: cont home albuterol prn  - duonebs q6     GI:   - NPO   - PPI while on steroids  - bowel regimen    Renal:   - IVF while NPO  - +marshall   - hx hyponatremia   - non-anion gap metabolic acidosis: s/p 650mg bicarb (12/4)     Heme:   - h/h stable  - DVT ppx: SCDs, holding SQL post op     ID:   - afebrile  - post op ancef    Endo:   - f/u a1c, ISS while on steroids    Dispo: ICU, full code, dispo pending, pending PT/OT     D/w Dr. Espinosa, Dr. Bryant  Assessment:  Present when checked    []  GCS  E   V  M     Heart Failure: []Acute, [] acute on chronic , []chronic  Heart Failure:  [] Diastolic (HFpEF), [] Systolic (HFrEF), []Combined (HFpEF and HFrEF), [] RHF, [] Pulm HTN, [] Other    [] RICARDO, [] ATN, [] AIN, [] other  [] CKD1, [] CKD2, [] CKD 3, [] CKD 4, [] CKD 5, []ESRD    Encephalopathy: [] Metabolic, [] Hepatic, [] toxic, [] Neurological, [] Other    Abnormal Nurtitional Status: [] malnurtition (see nutrition note), [ ]underweight: BMI < 19, [] morbid obesity: BMI >40, [] Cachexia    [] Sepsis  [] hypovolemic shock,[] cardiogenic shock, [] hemorrhagic shock, [] neuogenic shock  [] Acute Respiratory Failure  []Cerebral edema, [] Brain compression/ herniation,   [] Functional quadriplegia  [] Acute blood loss anemia

## 2024-12-04 NOTE — PHYSICAL THERAPY INITIAL EVALUATION ADULT - PERTINENT HX OF CURRENT PROBLEM, REHAB EVAL
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 Now s/p L crani L paraopthalmic ICA aneurysm clipping with intra-operative angiogram showing complete obliteration (12/3).

## 2024-12-04 NOTE — PHYSICAL THERAPY INITIAL EVALUATION ADULT - ADDITIONAL COMMENTS
Pt reports living with family in apartment with 2 FOS to enter. Reports being independent with daily activities without use of DME.

## 2024-12-04 NOTE — OCCUPATIONAL THERAPY INITIAL EVALUATION ADULT - MODALITIES TREATMENT COMMENTS
10/26/22       Bonnie Rivera   [address]  [place] [address]     Dear Bonnie:    You missed your scheduled appointment with our office on 10/19/22.  It is important to keep scheduled appointments so that we can provide you with quality care. If you are unable to keep an appointment, we ask that you call to cancel or reschedule at least 24 hours in advance.       Your health and well-being are important to us. We encourage you to keep your scheduled appointments and we look forward to continuing to meet your healthcare needs.        Sincerely,    [facility]  [address]  [address]  [facility] [address]  Phone: [phone]  Fax: [phone]   Cranial Nerves II - XII: II: PERRLA; visual fields are full to confrontation III, IV, VI: EOMI, no nystagmus appreciated V: facial sensation intact to light touch V1-V3 b/l VII: no ptosis, no facial droop, symmetric eyebrow raise and closure VIII: hearing intact to finger rub b/l  XI: head turning and shoulder shrug intact b/l XII: tongue protrusion midline. Vision Tracking (H Test): pt with mild overshooting in b/l eyes during tracking. Vision Quadrant Test: WFL B/L

## 2024-12-04 NOTE — OCCUPATIONAL THERAPY INITIAL EVALUATION ADULT - LEVEL OF INDEPENDENCE, REHAB EVAL
BS avg 150    Hemoglobin A1C   Date Value Ref Range Status   12/19/2016 6.6 (H) 4.5 - 6.2 % Final     Comment:     According to ADA guidelines, hemoglobin A1C <7.0% represents  optimal control in non-pregnant diabetic patients.  Different  metrics may apply to specific populations.   Standards of Medical Care in Diabetes - 2016.  For the purpose of screening for the presence of diabetes:  <5.7%     Consistent with the absence of diabetes  5.7-6.4%  Consistent with increasing risk for diabetes   (prediabetes)  >or=6.5%  Consistent with diabetes  Currently no consensus exists for use of hemoglobin A1C  for diagnosis of diabetes for children.     04/16/2016 6.5 (H) 4.5 - 6.2 % Final   12/18/2015 6.6 (H) 4.5 - 6.2 % Final       
minimum assist (75% patients effort)

## 2024-12-04 NOTE — OCCUPATIONAL THERAPY INITIAL EVALUATION ADULT - PERTINENT HX OF CURRENT PROBLEM, REHAB EVAL
Ms. Ritter is a 45yo female patient, who had in the past two episodes of transient right sided tingling and numbness (first episode one year ago and second episode 6 months ago). Imaging workup showed two incidental bilateral ICA aneurysms. The left ICA aneurysm is a 4mm intradural paraopthalmic aneurysm, and the second one is a right, probably extradural, 5.8mm paraopthalmic aneurysm. The initial plan was to treat the left intradural aneurysm with a flow diverter, however the patient suffered heavy menstruation bleeding requiring hospitalization and transfusion while beeing on Aspirin and Plavix (for Stroke therapy). The dual antiplatelet regimen was then stopped. The case was discussed in our interdisciplinary vascular board, and the recommendation was made for surgical clipping of the left intradural ICA aneurysm. Pt S/P left pterional craniotomy for aneurysm clipping with intra-op angiogram showing complete obliteration on 12/3, pt with new post-op SDH

## 2024-12-04 NOTE — PROVIDER CONTACT NOTE (OTHER) - ASSESSMENT
Patient on initial assessment AOX4. Speech hypophonic. Following commands. At 22:00 the patient was found to be more lethargic, no longer following commands. Requiring noxious stimulation to be aroused. As well as giving the wrong age (saying they're 13 years old).

## 2024-12-04 NOTE — PROGRESS NOTE ADULT - SUBJECTIVE AND OBJECTIVE BOX
NSCU Progress Note    HPI per chart review with edits   Ms. Stone Daley is a 43 y/o woman, recently on dual antiplatelet therapy for stroke therapy c/b heavy menstrual cycle requiring hospitalization for transfusions, who had in the past two episodes of transient right sided tingling and numbness (first episode one year ago and second episode 6 months ago). Imaging workup showed two incidental bilateral ICA aneurysms. 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 Pt AOx3, CSATRO, participating with exam. 20 min later, pt newly lethargic, no longer participating w exam, requiring deep noxious stimuli for w/d. not opening eyes, stroke code called. CTH w small SDH anterior to terence and compression of upper cervical cord c/f SDH. CTA head/neck, CTP negative. HFNC. abg improved. keppra inc to 1g BID.   12/4: POD 1. c/f non-anion gap metabolic acidosis, s/p bicarb 650mg PO. CXR: no acute pathology. SG CAMRON output         MEDICATIONS  (STANDING):  acetaminophen     Tablet .. 1000 milliGRAM(s) Oral every 8 hours  albuterol/ipratropium for Nebulization 3 milliLiter(s) Nebulizer every 6 hours  atorvastatin 20 milliGRAM(s) Oral at bedtime  chlorhexidine 2% Cloths 1 Application(s) Topical <User Schedule>  dexAMETHasone  Injectable 4 milliGRAM(s) IV Push every 6 hours  insulin lispro (ADMELOG) corrective regimen sliding scale   SubCutaneous Before meals and at bedtime  levETIRAcetam 1000 milliGRAM(s) Oral every 12 hours  multiple electrolytes Injection Type 1 1000 milliLiter(s) (50 mL/Hr) IV Continuous <Continuous>  pantoprazole  Injectable 40 milliGRAM(s) IV Push daily  polyethylene glycol 3350 17 Gram(s) Oral daily  senna 2 Tablet(s) Oral at bedtime  thiamine IVPB 500 milliGRAM(s) IV Intermittent every 8 hours    MEDICATIONS  (PRN):  albuterol    90 MICROgram(s) HFA Inhaler 2 Puff(s) Inhalation every 6 hours PRN for shortness of breath and/or wheezing  ondansetron Injectable 4 milliGRAM(s) IV Push every 6 hours PRN Nausea and/or Vomiting  traMADol 25 milliGRAM(s) Oral every 6 hours PRN Severe Pain (7 - 10)        PHYSICAL EXAM:  ICU Vital Signs Last 24 Hrs  T(C): 36.4 (04 Dec 2024 05:28), Max: 36.9 (03 Dec 2024 21:30)  T(F): 97.5 (04 Dec 2024 05:28), Max: 98.5 (03 Dec 2024 21:30)  HR: 59 (04 Dec 2024 13:00) (54 - 84)  BP: 115/64 (04 Dec 2024 12:00) (94/51 - 136/70)  BP(mean): 84 (04 Dec 2024 12:00) (67 - 98)  ABP: 108/104 (04 Dec 2024 10:00) (33/21 - 145/80)  ABP(mean): 107 (04 Dec 2024 10:00) (29 - 107)  RR: 25 (04 Dec 2024 13:00) (12 - 35)  SpO2: 100% (04 Dec 2024 13:00) (93% - 100%)  O2 Parameters below as of 04 Dec 2024 13:00  Patient On (Oxygen Delivery Method): nasal cannula, high flow  O2 Flow (L/min): 50  O2 Concentration (%): 40  General: calm, sleeping  HEENT: s/p left crani for aneurysmal clipping, left neck incision (clean/dry/no hematoma), carotid pulse present   CVS: RRR  Pulm: CTAB  GI: Soft, NTND  Extremities: No LE Edema, no groin hematoma  Neuro:   Mental status: sleepy, open eyes briefly to verbal stimulus, tracks and attends briefly, following commands with prompting, oriented to self, place and date  CNs: PERRL, EOMI, facial symmetrical, visual fields grossly full, face sensation intact b/l, tongue midline  Motor: UE and LE 5/5 throughout  Sensory: no deficit to LT at the UE and LE b/l       Neuroimaging and relevant labs reviewed    CT Perfusion w/ Maps w/ IV Cont (12.03.24 @ 22:24) >  IMPRESSION:  CT PERFUSION:  Technical limitations: None.  Core infarction: 0 ml  Penumbra / tissue at risk for active ischemia: 0 ml  CTA NECK  No evidence of significant stenosis or occlusion.  CTA HEAD:  No large vessel occlusion, significant stenosis.  Right internal carotid artery clinoid segmentaneurysm measures 5.3 x 4.7   x 4.1 mm.  Postoperative changes.    CT Brain Stroke Protocol (12.03.24 @ 22:24) >  IMPRESSION:  1.  No acute transcortical infarction.  2.  Small subdural hematoma anterior to the terence.  3.  Compression of thevisualized upper cervical cord with surrounding   hyperdense material suspicious for subdural hemorrhage.  4.  Diffuse cerebral sulcal effacement and decreased caliber of the   ventricles. 1 mm rightward midline shift.  5.  Left greater than right frontal pneumocephalus. Small amount of   extra-axial hemorrhage over the left frontal lobe adjacent to the   pneumocephalus.        Chest x-ray Normal cardiomediastinal and hilar silhouettes. The lungs are clear.  No   pleural effusions. No pneumothorax. Soft tissues and osseous structures   are within normal limits.

## 2024-12-04 NOTE — PHYSICAL THERAPY INITIAL EVALUATION ADULT - NSPTDISCHREC_GEN_A_CORE
TBD as pt unable to progress to sit to stand transferring as pt with increased fatigue while seated EOB

## 2024-12-04 NOTE — PHYSICAL THERAPY INITIAL EVALUATION ADULT - GENERAL OBSERVATIONS, REHAB EVAL
PT IE completed. Chart reviewed. MRS 5. Pt received semi-supine, NAD, +IV heplock, +tele, +L crani dressing C/D/I, +L crani CAMRON, +marshall. МАРИЯ Nelson cleared pt for PT.

## 2024-12-04 NOTE — PROCEDURE NOTE - ADDITIONAL PROCEDURE DETAILS
Patient resting in bed comfortably. Incision/drain covering removed. Drain taken off suction and site cleaned with chlorhexidine. Suture anchoring drain in place cut and drain removed. Sterile dressing placed with sterile gauze and tegaderm with light head wrap placed on top. Patient tolerated well.

## 2024-12-04 NOTE — PROGRESS NOTE ADULT - ASSESSMENT
ASSESSMENT/PLAN:   45 y/o woman, recently on dual antiplatelet therapy for stroke therapy c/b heavy menstrual cycle requiring hospitalization for transfusions, who had in the past two episodes of transient right sided tingling and numbness (first episode one year ago and second episode 6 months ago). Imaging workup showed two incidental bilateral ICA aneurysms. 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         NEURO - as above   - Neurochecks q1h  - Stat CT head for any acute change  - levetiracetam 1g bid for ?sz, vEEG  - dex [id number] per nsg for CNII manipulation intra-op  - Surgical drains per NSGY  - Pain control (avoid opioids oversedation)   - Off AC/AP at this time  - PT/OT ---> off bedrest       CV:  - -140mmHg  - troponin < 6  - TTE   - hemodynamic monitoring with a-line   - telemetry monitoring       PULM: hx of asthma, hypoxia post-op requiring HFNC ? atelectasis? airway obstruction?   ABG - ( 04 Dec 2024 14:22 )  pH, Arterial: 7.41  pH, Blood: x     /  pCO2: 36    /  pO2: 126   / HCO3: 23    / Base Excess: -1.4  /  SaO2: 98.8      - on HFNC ---> titrate O2 as needed   - chest x-ray wnl, no consolidation   - Incentive spirometry   - mobilize as tolerated  - Aspiration Precautions  - nebs  - consider PE study if hypoxia persists     RENAL: hyponatremia (chronic per patient), NAGMA?   12-04    134[L]  |  102  |  12  ----------------------------<  168[H]  4.0   |  20[L]  |  0.42[L]    Ca    8.2[L]      04 Dec 2024 05:22  Phos  3.5     12-04  Mg     2.5     12-04    TPro  6.0  /  Alb  3.6  /  TBili  0.5  /  DBili  x   /  AST  19  /  ALT  15  /  AlkPhos  69  12-03    I&O's Summary    03 Dec 2024 07:01  -  04 Dec 2024 07:00  --------------------------------------------------------  IN: 750 mL / OUT: 1310 mL / NET: -560 mL    04 Dec 2024 07:01  -  04 Dec 2024 14:25  --------------------------------------------------------  IN: 300 mL / OUT: 1015 mL / NET: -715 mL      - Fluids: IVF plasmalyte until good PO intake  - trend renal function  - d/c marshall   - NAGMA, hyponatremia corrected 134, ulytes, repeat bmp, CPK   - Obtain lactic acid  - Start thiamine for ketoacidosis (+ ketones in the urine)    GI:  - dysphagia screen   - consider starting PO diet if passes dysphagia screen and she is more awake   - GI prophylaxis: ppi while on ccs  - Bowel regimen standing      ENDO    CAPILLARY BLOOD GLUCOSE 141-194   A1C 6.0  - Goal euglycemia (-180)    HEME/ONC:  pt was previously on dual antiplatelet therapy for stroke therapy c/b heavy menstruation requiring hospitalization                         11.3   18.18 )-----------( 361      ( 04 Dec 2024 14:28 )             36.6     - monitor H/H, repeat cbc  - SCDs, holding AC at this time     ID:  - Elli-op antibiotics  - monitor fever curve and leukocytosis   - culture if she spikes > 38.3 C      Code Status: Full Code  Dispo: NeuroICU     Yennifer Stephens MD      Dispo: NSICU

## 2024-12-04 NOTE — OCCUPATIONAL THERAPY INITIAL EVALUATION ADULT - LEVEL OF CONSCIOUSNESS, OT EVAL
pt states she felt "sleepy" throughout the exam, pt required mod verbal cues to maintain eyes open throughout session/alert

## 2024-12-04 NOTE — PHYSICAL THERAPY INITIAL EVALUATION ADULT - SENSORY TESTS
(L) hand  5/5, (R) hand  5/5. CN Testing: B/L Frontalis intact; B/L buccinator intact; smile symmetrical; tongue protrusion at midline; B/L eyes open/close intact; Shoulder elevation: intact bilaterally; Vision H-Test: bilateral tracking and smooth pursuit mildly impaired; Convergence/Divergence: mildly impaired; Vision Quadrant Test: intact bilaterally. Rapid alternating movements: intact bilaterally

## 2024-12-04 NOTE — PROGRESS NOTE ADULT - ASSESSMENT
ASSESSMENT/PLAN:     s/p Lt pterional crani for Lt parophthalmic aneurysm clipping w/ intra-op angio showing complete obliteration of aneurysm      NEURO:  s/p Lt crani for Lt parophthalmic aneurysm  Untreated Rt ICA extra-dural aneurysm   - Neurochecks q1h  - levetiracetam 1g bid for ?sz, vEEG  - dex [id number] per nsg for CNII manipulation intra-op  - Pain control    PULM:  hx of asthma  - on HFNC 50/430  - Incentive spirometry   - mobilize as tolerated  - Aspiration Precautions  - nebs  - CTA to r/o PE      CV:  - -140mmHg  - tte    RENAL:  - Fluids: IVF plsamalyte until good PO intake  - trend renal function  - chronic hyponatremia per , followed outpatient    GI:  - Diet: adat  - GI prophylaxis: ppi while on ccs  - Bowel regimen standing      ENDO:   - Goal euglycemia (-180)    HEME/ONC:  pt was previously on dual antiplatelet therapy for stroke therapy c/b heavy menstruation requiring hospitalization   - monitor H/H, repeat cbc    VTE prophylaxis   - SCDs   - hold chemoprophylaxis due to: fresh post op      ID:  - Elli-op antibiotics      Dispo: NSICU

## 2024-12-04 NOTE — OCCUPATIONAL THERAPY INITIAL EVALUATION ADULT - DIAGNOSIS, OT EVAL
Pt S/P left pterional craniotomy for aneurysm clipping with intra-op angiogram showing complete obliteration on 12/3, pt with new post-op SDH. Pt presents with impaired vision, decreased strength, endurance, impacting ability to perform ADL and mobility.

## 2024-12-05 LAB
ANION GAP SERPL CALC-SCNC: 9 MMOL/L — SIGNIFICANT CHANGE UP (ref 5–17)
ANION GAP SERPL CALC-SCNC: 9 MMOL/L — SIGNIFICANT CHANGE UP (ref 5–17)
BUN SERPL-MCNC: 10 MG/DL — SIGNIFICANT CHANGE UP (ref 7–23)
BUN SERPL-MCNC: 14 MG/DL — SIGNIFICANT CHANGE UP (ref 7–23)
CALCIUM SERPL-MCNC: 8.5 MG/DL — SIGNIFICANT CHANGE UP (ref 8.4–10.5)
CALCIUM SERPL-MCNC: 8.6 MG/DL — SIGNIFICANT CHANGE UP (ref 8.4–10.5)
CHLORIDE SERPL-SCNC: 105 MMOL/L — SIGNIFICANT CHANGE UP (ref 96–108)
CHLORIDE SERPL-SCNC: 105 MMOL/L — SIGNIFICANT CHANGE UP (ref 96–108)
CO2 SERPL-SCNC: 23 MMOL/L — SIGNIFICANT CHANGE UP (ref 22–31)
CO2 SERPL-SCNC: 24 MMOL/L — SIGNIFICANT CHANGE UP (ref 22–31)
CREAT SERPL-MCNC: 0.42 MG/DL — LOW (ref 0.5–1.3)
CREAT SERPL-MCNC: 0.58 MG/DL — SIGNIFICANT CHANGE UP (ref 0.5–1.3)
EGFR: 114 ML/MIN/1.73M2 — SIGNIFICANT CHANGE UP
EGFR: 124 ML/MIN/1.73M2 — SIGNIFICANT CHANGE UP
GLUCOSE SERPL-MCNC: 149 MG/DL — HIGH (ref 70–99)
GLUCOSE SERPL-MCNC: 153 MG/DL — HIGH (ref 70–99)
HCT VFR BLD CALC: 33.4 % — LOW (ref 34.5–45)
HGB BLD-MCNC: 10.4 G/DL — LOW (ref 11.5–15.5)
MAGNESIUM SERPL-MCNC: 2.4 MG/DL — SIGNIFICANT CHANGE UP (ref 1.6–2.6)
MCHC RBC-ENTMCNC: 24.5 PG — LOW (ref 27–34)
MCHC RBC-ENTMCNC: 31.1 G/DL — LOW (ref 32–36)
MCV RBC AUTO: 78.8 FL — LOW (ref 80–100)
NRBC # BLD: 0 /100 WBCS — SIGNIFICANT CHANGE UP (ref 0–0)
PHOSPHATE SERPL-MCNC: 3.4 MG/DL — SIGNIFICANT CHANGE UP (ref 2.5–4.5)
PLATELET # BLD AUTO: 359 K/UL — SIGNIFICANT CHANGE UP (ref 150–400)
POTASSIUM SERPL-MCNC: 4.2 MMOL/L — SIGNIFICANT CHANGE UP (ref 3.5–5.3)
POTASSIUM SERPL-MCNC: 4.2 MMOL/L — SIGNIFICANT CHANGE UP (ref 3.5–5.3)
POTASSIUM SERPL-SCNC: 4.2 MMOL/L — SIGNIFICANT CHANGE UP (ref 3.5–5.3)
POTASSIUM SERPL-SCNC: 4.2 MMOL/L — SIGNIFICANT CHANGE UP (ref 3.5–5.3)
RBC # BLD: 4.24 M/UL — SIGNIFICANT CHANGE UP (ref 3.8–5.2)
RBC # FLD: 21.7 % — HIGH (ref 10.3–14.5)
SODIUM SERPL-SCNC: 137 MMOL/L — SIGNIFICANT CHANGE UP (ref 135–145)
SODIUM SERPL-SCNC: 138 MMOL/L — SIGNIFICANT CHANGE UP (ref 135–145)
WBC # BLD: 16.87 K/UL — HIGH (ref 3.8–10.5)
WBC # FLD AUTO: 16.87 K/UL — HIGH (ref 3.8–10.5)

## 2024-12-05 PROCEDURE — 71275 CT ANGIOGRAPHY CHEST: CPT | Mod: 26

## 2024-12-05 PROCEDURE — 99291 CRITICAL CARE FIRST HOUR: CPT

## 2024-12-05 RX ORDER — ENOXAPARIN SODIUM 30 MG/.3ML
40 INJECTION SUBCUTANEOUS
Refills: 0 | Status: DISCONTINUED | OUTPATIENT
Start: 2024-12-05 | End: 2024-12-08

## 2024-12-05 RX ORDER — ACETAMINOPHEN 500MG 500 MG/1
650 TABLET, COATED ORAL EVERY 6 HOURS
Refills: 0 | Status: DISCONTINUED | OUTPATIENT
Start: 2024-12-05 | End: 2024-12-08

## 2024-12-05 RX ORDER — LEVETIRACETAM 1000 MG/1
500 TABLET ORAL EVERY 12 HOURS
Refills: 0 | Status: DISCONTINUED | OUTPATIENT
Start: 2024-12-05 | End: 2024-12-08

## 2024-12-05 RX ADMIN — ACETAMINOPHEN 500MG 1000 MILLIGRAM(S): 500 TABLET, COATED ORAL at 07:00

## 2024-12-05 RX ADMIN — Medication 2: at 22:07

## 2024-12-05 RX ADMIN — DEXAMETHASONE 4 MILLIGRAM(S): 1.5 TABLET ORAL at 17:15

## 2024-12-05 RX ADMIN — Medication 2 TABLET(S): at 21:51

## 2024-12-05 RX ADMIN — TRAMADOL HYDROCHLORIDE 25 MILLIGRAM(S): 300 CAPSULE ORAL at 04:11

## 2024-12-05 RX ADMIN — DEXAMETHASONE 4 MILLIGRAM(S): 1.5 TABLET ORAL at 12:04

## 2024-12-05 RX ADMIN — IPRATROPIUM BROMIDE AND ALBUTEROL SULFATE 3 MILLILITER(S): 2.5; .5 SOLUTION RESPIRATORY (INHALATION) at 00:52

## 2024-12-05 RX ADMIN — IPRATROPIUM BROMIDE AND ALBUTEROL SULFATE 3 MILLILITER(S): 2.5; .5 SOLUTION RESPIRATORY (INHALATION) at 05:43

## 2024-12-05 RX ADMIN — Medication 50 MILLILITER(S): at 00:27

## 2024-12-05 RX ADMIN — Medication 105 MILLIGRAM(S): at 14:31

## 2024-12-05 RX ADMIN — LEVETIRACETAM 1000 MILLIGRAM(S): 1000 TABLET ORAL at 06:13

## 2024-12-05 RX ADMIN — Medication 105 MILLIGRAM(S): at 06:13

## 2024-12-05 RX ADMIN — ACETAMINOPHEN 500MG 650 MILLIGRAM(S): 500 TABLET, COATED ORAL at 17:13

## 2024-12-05 RX ADMIN — TRAMADOL HYDROCHLORIDE 25 MILLIGRAM(S): 300 CAPSULE ORAL at 23:05

## 2024-12-05 RX ADMIN — IPRATROPIUM BROMIDE AND ALBUTEROL SULFATE 3 MILLILITER(S): 2.5; .5 SOLUTION RESPIRATORY (INHALATION) at 23:55

## 2024-12-05 RX ADMIN — TRAMADOL HYDROCHLORIDE 25 MILLIGRAM(S): 300 CAPSULE ORAL at 22:07

## 2024-12-05 RX ADMIN — Medication 20 MILLIGRAM(S): at 21:52

## 2024-12-05 RX ADMIN — ENOXAPARIN SODIUM 40 MILLIGRAM(S): 30 INJECTION SUBCUTANEOUS at 22:07

## 2024-12-05 RX ADMIN — DEXAMETHASONE 4 MILLIGRAM(S): 1.5 TABLET ORAL at 06:14

## 2024-12-05 RX ADMIN — Medication 105 MILLIGRAM(S): at 21:51

## 2024-12-05 RX ADMIN — CHLORHEXIDINE GLUCONATE 1 APPLICATION(S): 1.2 RINSE ORAL at 06:14

## 2024-12-05 RX ADMIN — ACETAMINOPHEN 500MG 650 MILLIGRAM(S): 500 TABLET, COATED ORAL at 18:30

## 2024-12-05 RX ADMIN — ACETAMINOPHEN 500MG 1000 MILLIGRAM(S): 500 TABLET, COATED ORAL at 06:13

## 2024-12-05 RX ADMIN — POLYETHYLENE GLYCOL 3350 17 GRAM(S): 17 POWDER, FOR SOLUTION ORAL at 12:04

## 2024-12-05 RX ADMIN — TRAMADOL HYDROCHLORIDE 25 MILLIGRAM(S): 300 CAPSULE ORAL at 05:00

## 2024-12-05 RX ADMIN — Medication 50 MILLILITER(S): at 20:05

## 2024-12-05 RX ADMIN — LEVETIRACETAM 500 MILLIGRAM(S): 1000 TABLET ORAL at 17:13

## 2024-12-05 RX ADMIN — IPRATROPIUM BROMIDE AND ALBUTEROL SULFATE 3 MILLILITER(S): 2.5; .5 SOLUTION RESPIRATORY (INHALATION) at 17:26

## 2024-12-05 RX ADMIN — IPRATROPIUM BROMIDE AND ALBUTEROL SULFATE 3 MILLILITER(S): 2.5; .5 SOLUTION RESPIRATORY (INHALATION) at 13:05

## 2024-12-05 NOTE — PROVIDER CONTACT NOTE (MEDICATION) - RECOMMENDATIONS
this RN asked "with her hx of bleeding while on blood thinners, do you still recommend administering the enoxaparin?"

## 2024-12-05 NOTE — PROGRESS NOTE ADULT - SUBJECTIVE AND OBJECTIVE BOX
Neurocritical Care Attending Note    HPI per chart review with edits   Ms. Stone Daley is a 45 y/o woman, recently on dual antiplatelet therapy for stroke therapy c/b heavy menstrual cycle requiring hospitalization for transfusions, who had in the past two episodes of transient right sided tingling and numbness (first episode one year ago and second episode 6 months ago). Imaging workup showed two incidental bilateral ICA aneurysms. 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 Pt AOx3, CASTRO, participating with exam. 20 min later, pt newly lethargic, no longer participating w exam, requiring deep noxious stimuli for w/d. not opening eyes, stroke code called. CTH w small SDH anterior to terence and compression of upper cervical cord c/f SDH. CTA head/neck, CTP negative. HFNC. abg improved. keppra inc to 1g BID.   12/4: POD 1. c/f non-anion gap metabolic acidosis, s/p bicarb 650mg PO. CXR: no acute pathology. SG CAMRON dc'd.   12/5: POD 2. CTA PE protocol complete. Neuro stable.CTA PE protocol negative, w L>R atelectasis.     MEDICATIONS  (STANDING):  albuterol/ipratropium for Nebulization 3 milliLiter(s) Nebulizer every 6 hours  atorvastatin 20 milliGRAM(s) Oral at bedtime  chlorhexidine 2% Cloths 1 Application(s) Topical <User Schedule>  dexAMETHasone  Injectable 4 milliGRAM(s) IV Push every 6 hours  insulin lispro (ADMELOG) corrective regimen sliding scale   SubCutaneous Before meals and at bedtime  levETIRAcetam 1000 milliGRAM(s) Oral every 12 hours  multiple electrolytes Injection Type 1 1000 milliLiter(s) (50 mL/Hr) IV Continuous <Continuous>  pantoprazole  Injectable 40 milliGRAM(s) IV Push daily  polyethylene glycol 3350 17 Gram(s) Oral daily  senna 2 Tablet(s) Oral at bedtime  thiamine IVPB 500 milliGRAM(s) IV Intermittent every 8 hours    MEDICATIONS  (PRN):  albuterol    90 MICROgram(s) HFA Inhaler 2 Puff(s) Inhalation every 6 hours PRN for shortness of breath and/or wheezing  ondansetron Injectable 4 milliGRAM(s) IV Push every 6 hours PRN Nausea and/or Vomiting  traMADol 25 milliGRAM(s) Oral every 6 hours PRN Severe Pain (7 - 10)        PHYSICAL EXAM:  ICU Vital Signs Last 24 Hrs  T(C): 36.3 (05 Dec 2024 05:54), Max: 37.2 (05 Dec 2024 01:06)  T(F): 97.3 (05 Dec 2024 05:54), Max: 99 (05 Dec 2024 01:06)  HR: 58 (05 Dec 2024 07:00) (55 - 84)  BP: 121/65 (05 Dec 2024 07:00) (92/58 - 121/65)  BP(mean): 87 (05 Dec 2024 07:00) (67 - 87)  ABP: 108/104 (04 Dec 2024 10:00) (96/78 - 108/104)  ABP(mean): 107 (04 Dec 2024 10:00) (74 - 107)  RR: 16 (05 Dec 2024 07:00) (11 - 32)  SpO2: 100% (05 Dec 2024 07:00) (96% - 100%)  General: calm, sleeping  HEENT: s/p left crani for aneurysmal clipping, left neck incision (clean/dry/no hematoma), carotid pulse present   CVS: RRR  Pulm: CTAB  GI: Soft, NTND  Extremities: No LE Edema, no groin hematoma  Neuro:   Mental status: sleepy, open eyes briefly to verbal stimulus, tracks and attends briefly, following commands with prompting, oriented to self, place and date  CNs: PERRL, EOMI, facial symmetrical, visual fields grossly full, face sensation intact b/l, tongue midline  Motor: UE and LE 5/5 throughout  Sensory: no deficit to LT at the UE and LE b/l       Neuroimaging and relevant labs reviewed    CT Perfusion w/ Maps w/ IV Cont (12.03.24 @ 22:24) >  IMPRESSION:  CT PERFUSION:  Technical limitations: None.  Core infarction: 0 ml  Penumbra / tissue at risk for active ischemia: 0 ml  CTA NECK  No evidence of significant stenosis or occlusion.  CTA HEAD:  No large vessel occlusion, significant stenosis.  Right internal carotid artery clinoid segmentaneurysm measures 5.3 x 4.7   x 4.1 mm.  Postoperative changes.    CT Brain Stroke Protocol (12.03.24 @ 22:24) >  IMPRESSION:  1.  No acute transcortical infarction.  2.  Small subdural hematoma anterior to the terence.  3.  Compression of thevisualized upper cervical cord with surrounding   hyperdense material suspicious for subdural hemorrhage.  4.  Diffuse cerebral sulcal effacement and decreased caliber of the   ventricles. 1 mm rightward midline shift.  5.  Left greater than right frontal pneumocephalus. Small amount of   extra-axial hemorrhage over the left frontal lobe adjacent to the   pneumocephalus.        Chest x-ray Normal cardiomediastinal and hilar silhouettes. The lungs are clear.  No   pleural effusions. No pneumothorax. Soft tissues and osseous structures   are within normal limits.

## 2024-12-05 NOTE — PROVIDER CONTACT NOTE (MEDICATION) - ASSESSMENT
Enoxaparin due at 10pm. D/T Hx of bleed and transfusion. This RN calling to verify administration of blood thinner.

## 2024-12-05 NOTE — PROGRESS NOTE ADULT - ASSESSMENT
ASSESSMENT/PLAN:   43 y/o woman, recently on dual antiplatelet therapy for stroke therapy c/b heavy menstrual cycle requiring hospitalization for transfusions, who had in the past two episodes of transient right sided tingling and numbness (first episode one year ago and second episode 6 months ago). Imaging workup showed two incidental bilateral ICA aneurysms. 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         NEURO - as above   - Neurochecks q1h  - Stat CT head for any acute change  - levetiracetam 1g bid   - dex [id number] per nsg for CNII manipulation intra-op  - Surgical drains per NSGY  - Pain control (avoid opioids oversedation)   - PT/OT  - out of bed      CV:  - -140mmHg  - troponin < 6  - TTE   - hemodynamic monitoring with a-line   - telemetry monitoring       PULM: hx of asthma, hypoxia post-op requiring HFNC ? atelectasis? airway obstruction?   ABG - ( 04 Dec 2024 14:22 )  pH, Arterial: 7.41  pH, Blood: x     /  pCO2: 36    /  pO2: 126   / HCO3: 23    / Base Excess: -1.4  /  SaO2: 98.8      - on HFNC ---> titrate O2 as needed   - chest x-ray wnl, no consolidation   - Incentive spirometry   - mobilize as tolerated  - Aspiration Precautions  - nebs  - consider PE study if hypoxia persists     RENAL: hyponatremia (chronic per patient), NAGMA?   12-05    137  |  105  |  10  ----------------------------<  149[H]  4.2   |  23  |  0.42[L]    Ca    8.5      05 Dec 2024 05:30  Phos  3.4     12-05  Mg     2.4     12-05    TPro  7.0  /  Alb  4.0  /  TBili  0.9  /  DBili  x   /  AST  24  /  ALT  18  /  AlkPhos  79  12-04  I&O's Summary    04 Dec 2024 07:01  -  05 Dec 2024 07:00  --------------------------------------------------------  IN: 3300 mL / OUT: 4240 mL / NET: -940 mL    05 Dec 2024 07:01  -  05 Dec 2024 07:53  --------------------------------------------------------  IN: 50 mL / OUT: 0 mL / NET: 50 mL    - Fluids: IVF plasmalyte until good PO intake  - trend renal function  - voiding   - Thiamine     GI:  - Regular diet   - GI prophylaxis: ppi while on ccs  - Bowel regimen standing      ENDO    CAPILLARY BLOOD GLUCOSE [id number]  A1C 6.0  - Goal euglycemia (-180)    HEME/ONC:  pt was previously on dual antiplatelet therapy for stroke therapy c/b heavy menstruation requiring hospitalization                         10.4   16.87 )-----------( 359      ( 05 Dec 2024 05:30 )             33.4     - monitor H/H, repeat cbc  - SCDs, start Lovenox tonight     ID: leukocytosis improving, afebrile   - Elli-op antibiotics  - monitor fever curve and leukocytosis   - culture if she spikes > 38.3 C      Code Status: Full Code  Dispo: NeuroICU     Yennifer Stephens MD      Dispo: NSICU   ASSESSMENT/PLAN:   43 y/o woman, recently on dual antiplatelet therapy for stroke therapy c/b heavy menstrual cycle requiring hospitalization for transfusions, who had in the past two episodes of transient right sided tingling and numbness (first episode one year ago and second episode 6 months ago). Imaging workup showed two incidental bilateral ICA aneurysms. 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         NEURO - as above   - Neurochecks q1h  - Stat CT head for any acute change  - levetiracetam 1g bid   - EEG today, consider reducing dose of Keppra to 500 BID given sleepiness   - dex [id number] per nsg for CNII manipulation intra-op  - Surgical drains per NSGY  - Pain control (avoid opioids oversedation)   - PT/OT  - out of bed      CV:  - -140mmHg  - troponin < 6  - TTE   - hemodynamic monitoring with a-line   - telemetry monitoring       PULM: hx of asthma, hypoxia post-op requiring HFNC due to atelectasis   ABG - ( 04 Dec 2024 14:22 )  pH, Arterial: 7.41  pH, Blood: x     /  pCO2: 36    /  pO2: 126   / HCO3: 23    / Base Excess: -1.4  /  SaO2: 98.8      CT Chest PE study 12/4: IMPRESSION:  1.  No pulmonary embolism.  2.  Left greater than right basilar atelectasis.      - weaned to NC   - CT PE study negative   - chest x-ray wnl, no consolidation   - Incentive spirometry   - out of bed to chair/PT  - Aspiration Precautions  - nebs      RENAL: hyponatremia (chronic per patient), NAGMA?   12-05    137  |  105  |  10  ----------------------------<  149[H]  4.2   |  23  |  0.42[L]    Ca    8.5      05 Dec 2024 05:30  Phos  3.4     12-05  Mg     2.4     12-05    TPro  7.0  /  Alb  4.0  /  TBili  0.9  /  DBili  x   /  AST  24  /  ALT  18  /  AlkPhos  79  12-04  I&O's Summary    04 Dec 2024 07:01  -  05 Dec 2024 07:00  --------------------------------------------------------  IN: 3300 mL / OUT: 4240 mL / NET: -940 mL    05 Dec 2024 07:01  -  05 Dec 2024 07:53  --------------------------------------------------------  IN: 50 mL / OUT: 0 mL / NET: 50 mL    - Fluids: IVF plasmalyte until good PO intake  - trend renal function  - Voiding   - Thiamine supplementation     GI:  - Regular diet   - GI prophylaxis: ppi while on ccs  - Bowel regimen standing      ENDO    CAPILLARY BLOOD GLUCOSE 141-180  A1C 6.0  - Goal euglycemia (-180)    HEME/ONC:  pt was previously on dual antiplatelet therapy for stroke therapy c/b heavy menstruation requiring hospitalization                         10.4   16.87 )-----------( 359      ( 05 Dec 2024 05:30 )             33.4     - monitor H/H, repeat cbc  - SCDs, start Lovenox tonight     ID: leukocytosis improving, afebrile   - Elli-op antibiotics  - monitor fever curve and leukocytosis   - culture if she spikes > 38.3 C      Code Status: Full Code  Dispo: Consider Stepdown within the next 24 hours if she remains stable    Yennifer Stephens MD      [facility]

## 2024-12-05 NOTE — PROGRESS NOTE ADULT - SUBJECTIVE AND OBJECTIVE BOX
S/Overnight events:    Seen and examined in [facility]. Reports incisional pain, improving with meds. Denies HA, N/V, chest pain, shortness of breath, new weakness or numbness.     Hospital Course:   12/3: POD0 L crani L paraopthalmic ICA aneurysm clipping. Pt AOx3, CASTRO, participating with exam. 20 min later, pt newly lethargic, no longer participating w exam, requiring deep noxious stimuli for w/d. not opening eyes, stroke code called. CTH w small SDH anterior to terence and compression of upper cervical cord c/f SDH. CTA head/neck, CTP negative. HFNC. abg improved. keppra inc to 1g BID.   12/4: POD 1. c/f non-anion gap metabolic acidosis, s/p bicarb 650mg PO. CXR: no acute pathology. SG CAMRON dc'd.   12/5: POD 2. CTA PE protocol complete. Neuro stable.      Vital Signs Last 24 Hrs  T(C): 37.2 (05 Dec 2024 01:06), Max: 37.2 (05 Dec 2024 01:06)  T(F): 99 (05 Dec 2024 01:06), Max: 99 (05 Dec 2024 01:06)  HR: 66 (05 Dec 2024 02:00) (54 - 84)  BP: 98/55 (05 Dec 2024 02:00) (92/58 - 123/69)  BP(mean): 71 (05 Dec 2024 02:00) (67 - 90)  RR: 13 (05 Dec 2024 02:00) (12 - 34)  SpO2: 99% (05 Dec 2024 02:00) (96% - 100%)    Parameters below as of 05 Dec 2024 02:00  Patient On (Oxygen Delivery Method): nasal cannula, high flow  O2 Flow (L/min): 50  O2 Concentration (%): 30    I&O's Detail    03 Dec 2024 07:01  -  04 Dec 2024 07:00  --------------------------------------------------------  IN:    IV PiggyBack: 300 mL    IV PiggyBack: 50 mL    multiple electrolytes Injection Type 1.: 250 mL    sodium chloride 0.9%: 150 mL  Total IN: 750 mL    OUT:    Bulb (mL): 150 mL    Indwelling Catheter - Urethral (mL): 1160 mL  Total OUT: 1310 mL    Total NET: -560 mL      04 Dec 2024 07:01  -  05 Dec 2024 02:13  --------------------------------------------------------  IN:    IV PiggyBack: 100 mL    IV PiggyBack: 200 mL    multiple electrolytes Injection Type 1.: 900 mL    Oral Fluid: 600 mL    Sodium Chloride 0.9% Bolus: 500 mL  Total IN: 2300 mL    OUT:    Bulb (mL): 60 mL    Indwelling Catheter - Urethral (mL): 1030 mL    Voided (mL): 2225 mL  Total OUT: 3315 mL    Total NET: -1015 mL        I&O's Summary    03 Dec 2024 07:01  -  04 Dec 2024 07:00  --------------------------------------------------------  IN: 750 mL / OUT: 1310 mL / NET: -560 mL    04 Dec 2024 07:01  -  05 Dec 2024 02:13  --------------------------------------------------------  IN: 2300 mL / OUT: 3315 mL / NET: -1015 mL        PHYSICAL EXAM:  General: NAD, pt is sitting up in bed, on HFNC 50/30  HEENT: PERRL 3mm briskly reactive, EOMI b/l, (+) visual fields intact to finger counting, face symmetric, tongue midline, neck FROM  CV: RRR, S1, S2  Resp: breathing non-labored on RA, chest rise symmetric  GI: abd soft, NTND  Neuro: AOx3, no aphasia, speech clear, no dysmetria, no pronator drift. Follows commands.  MATTHEW ordonez4 spontaneously. 5/5 strength in all extremities throughout. Sensation intact.   Extremities: distal pulses 2+ x4.  Wound/incision: left crani incision with dressing c/d/i; left neck incision w dressing c/d/i, soft, w/o hematoma  Drain: n/a     TUBES/LINES:  [] CVC  [] A-line  [] Lumbar Drain  [] Ventriculostomy  [] Sin  [] Other    DIET:  [] NPO  [x] Mechanical  [] Tube feeds    LABS:                        11.3   18.18 )-----------( 361      ( 04 Dec 2024 14:28 )             36.6     12-04    139  |  105  |  10  ----------------------------<  137[H]  4.3   |  19[L]  |  0.46[L]    Ca    8.6      04 Dec 2024 14:28  Phos  3.7     12-04  Mg     2.4     12-04    TPro  7.0  /  Alb  4.0  /  TBili  0.9  /  DBili  x   /  AST  24  /  ALT  18  /  AlkPhos  79  12-04    PT/INR - ( 03 Dec 2024 21:48 )   PT: 11.8 sec;   INR: 1.03          PTT - ( 03 Dec 2024 21:48 )  PTT:27.1 sec  Urinalysis Basic - ( 04 Dec 2024 14:28 )    Color: x / Appearance: x / SG: x / pH: x  Gluc: 137 mg/dL / Ketone: x  / Bili: x / Urobili: x   Blood: x / Protein: x / Nitrite: x   Leuk Esterase: x / RBC: x / WBC x   Sq Epi: x / Non Sq Epi: x / Bacteria: x          CAPILLARY BLOOD GLUCOSE      POCT Blood Glucose.: 180 mg/dL (04 Dec 2024 22:14)  POCT Blood Glucose.: 150 mg/dL (04 Dec 2024 16:39)  POCT Blood Glucose.: 141 mg/dL (04 Dec 2024 11:19)  POCT Blood Glucose.: 180 mg/dL (04 Dec 2024 06:22)      Drug Levels: [] N/A    CSF Analysis: [] N/A      Allergies    No Known Allergies    Intolerances      MEDICATIONS:  Antibiotics:    Neuro:  acetaminophen     Tablet .. 1000 milliGRAM(s) Oral every 8 hours  levETIRAcetam 1000 milliGRAM(s) Oral every 12 hours  ondansetron Injectable 4 milliGRAM(s) IV Push every 6 hours PRN  traMADol 25 milliGRAM(s) Oral every 6 hours PRN    Anticoagulation:    OTHER:  albuterol    90 MICROgram(s) HFA Inhaler 2 Puff(s) Inhalation every 6 hours PRN  albuterol/ipratropium for Nebulization 3 milliLiter(s) Nebulizer every 6 hours  atorvastatin 20 milliGRAM(s) Oral at bedtime  chlorhexidine 2% Cloths 1 Application(s) Topical <User Schedule>  dexAMETHasone  Injectable 4 milliGRAM(s) IV Push every 6 hours  insulin lispro (ADMELOG) corrective regimen sliding scale   SubCutaneous Before meals and at bedtime  pantoprazole  Injectable 40 milliGRAM(s) IV Push daily  polyethylene glycol 3350 17 Gram(s) Oral daily  senna 2 Tablet(s) Oral at bedtime    IVF:  multiple electrolytes Injection Type 1 1000 milliLiter(s) IV Continuous <Continuous>  thiamine IVPB 500 milliGRAM(s) IV Intermittent every 8 hours    CULTURES:    RADIOLOGY & ADDITIONAL TESTS:  CTA PE protocol read pending    ASSESSMENT:  45 yo female patient with PMH of HLD, anxiety, TIAs, bilateral ICA aneurysms found incidentally on TIA w/u, presenting for elective L ICA aneurysm clipping. Now s/p L crani for L paraopthalmic ICA aneurysm clipping with intra-operative angiogram showing complete obliteration (12/3).    I72.0    Family history of prostate cancer (Father)    Handoff    No pertinent past medical history    Asthma    Dust allergy    Gastritis    Breast cancer metastasized to brain    Brain aneurysm    Brain aneurysm    Intracranial aneurysm    Intracranial aneurysm    Craniotomy for aneurysm repair    Angiogram, cerebral, unilateral    No significant past surgical history    H/O endoscopy    [email or web address]        PLAN:  Neuro:   - neuro/vitals q1h  - pain control: tylenol 1g q8 standing, tramadol 25mg q6 prn   - seizure ppx: keppra 1g bid   - optic nerve edema: dex 4q6 x 48 hours (ED: 12/5)  - s/p removal SG CAMRON x1 (12/4)  - CTH 12/3 w small SDH anterior to terence and compression of upper cervical cord c/f SDH  - CTA head/neck, CTP 12/3 negative   - s/p left neck incision intra-op cervical ICA control, monitor neck for s/s hematoma    Cards:   - -140  - HLD: cont home lipitor 20mg     Pulm:   - HFNC 50/30  - hx asthma: cont home albuterol prn  - duonebs q6   - CTA PE protocol complete 12/5    GI:   - CCD  - PPI while on steroids  - bowel regimen    Renal:   - IVF i/s/o contrast from imaging  - Voiding  - hx hyponatremia   - non-anion gap metabolic acidosis: thiamine q8 x3 days, s/p 650mg bicarb (12/4)    Heme:   - h/h stable  - DVT ppx: SCDs, holding SQL post op     ID:   - afebrile  - post op ancef    Endo:   - A1c 6.0%, ISS while on steroids    Dispo: ICU, full code, dispo pending, pending PT/OT     D/w Dr. Espinosa, Dr. Bryant      Assessment:  Present when checked    []  GCS  E   V  M     Heart Failure: []Acute, [] acute on chronic , []chronic  Heart Failure:  [] Diastolic (HFpEF), [] Systolic (HFrEF), []Combined (HFpEF and HFrEF), [] RHF, [] Pulm HTN, [] Other    [] RICARDO, [] ATN, [] AIN, [] other  [] CKD1, [] CKD2, [] CKD 3, [] CKD 4, [] CKD 5, []ESRD    Encephalopathy: [] Metabolic, [] Hepatic, [] toxic, [] Neurological, [] Other    Abnormal Nurtitional Status: [] malnurtition (see nutrition note), [ ]underweight: BMI < 19, [] morbid obesity: BMI >40, [] Cachexia    [] Sepsis  [] hypovolemic shock,[] cardiogenic shock, [] hemorrhagic shock, [] neuogenic shock  [] Acute Respiratory Failure  []Cerebral edema, [] Brain compression/ herniation,   [] Functional quadriplegia  [] Acute blood loss anemia

## 2024-12-05 NOTE — PROVIDER CONTACT NOTE (MEDICATION) - BACKGROUND
Reason for admission: L crani for paraopthamalic ICA aneurism clipping. Hx of severe menstrual bleeding while on aspirin and Plavix; required hospitalization and transfusion.

## 2024-12-06 ENCOUNTER — TRANSCRIPTION ENCOUNTER (OUTPATIENT)
Age: 44
End: 2024-12-06

## 2024-12-06 DIAGNOSIS — Z98.890 OTHER SPECIFIED POSTPROCEDURAL STATES: ICD-10-CM

## 2024-12-06 DIAGNOSIS — E78.5 HYPERLIPIDEMIA, UNSPECIFIED: ICD-10-CM

## 2024-12-06 DIAGNOSIS — J45.909 UNSPECIFIED ASTHMA, UNCOMPLICATED: ICD-10-CM

## 2024-12-06 DIAGNOSIS — E87.20 ACIDOSIS, UNSPECIFIED: ICD-10-CM

## 2024-12-06 LAB
ANION GAP SERPL CALC-SCNC: 9 MMOL/L — SIGNIFICANT CHANGE UP (ref 5–17)
BUN SERPL-MCNC: 15 MG/DL — SIGNIFICANT CHANGE UP (ref 7–23)
CALCIUM SERPL-MCNC: 8.6 MG/DL — SIGNIFICANT CHANGE UP (ref 8.4–10.5)
CHLORIDE SERPL-SCNC: 105 MMOL/L — SIGNIFICANT CHANGE UP (ref 96–108)
CO2 SERPL-SCNC: 23 MMOL/L — SIGNIFICANT CHANGE UP (ref 22–31)
CREAT SERPL-MCNC: 0.47 MG/DL — LOW (ref 0.5–1.3)
EGFR: 120 ML/MIN/1.73M2 — SIGNIFICANT CHANGE UP
GLUCOSE SERPL-MCNC: 121 MG/DL — HIGH (ref 70–99)
HCT VFR BLD CALC: 32.8 % — LOW (ref 34.5–45)
HGB BLD-MCNC: 10.3 G/DL — LOW (ref 11.5–15.5)
MAGNESIUM SERPL-MCNC: 2.2 MG/DL — SIGNIFICANT CHANGE UP (ref 1.6–2.6)
MCHC RBC-ENTMCNC: 25.2 PG — LOW (ref 27–34)
MCHC RBC-ENTMCNC: 31.4 G/DL — LOW (ref 32–36)
MCV RBC AUTO: 80.4 FL — SIGNIFICANT CHANGE UP (ref 80–100)
NRBC # BLD: 0 /100 WBCS — SIGNIFICANT CHANGE UP (ref 0–0)
PHOSPHATE SERPL-MCNC: 3.3 MG/DL — SIGNIFICANT CHANGE UP (ref 2.5–4.5)
PLATELET # BLD AUTO: 312 K/UL — SIGNIFICANT CHANGE UP (ref 150–400)
POTASSIUM SERPL-MCNC: 3.9 MMOL/L — SIGNIFICANT CHANGE UP (ref 3.5–5.3)
POTASSIUM SERPL-SCNC: 3.9 MMOL/L — SIGNIFICANT CHANGE UP (ref 3.5–5.3)
RBC # BLD: 4.08 M/UL — SIGNIFICANT CHANGE UP (ref 3.8–5.2)
RBC # FLD: 22.2 % — HIGH (ref 10.3–14.5)
SODIUM SERPL-SCNC: 137 MMOL/L — SIGNIFICANT CHANGE UP (ref 135–145)
WBC # BLD: 11.89 K/UL — HIGH (ref 3.8–10.5)
WBC # FLD AUTO: 11.89 K/UL — HIGH (ref 3.8–10.5)

## 2024-12-06 PROCEDURE — 99232 SBSQ HOSP IP/OBS MODERATE 35: CPT

## 2024-12-06 PROCEDURE — 99222 1ST HOSP IP/OBS MODERATE 55: CPT

## 2024-12-06 RX ORDER — IPRATROPIUM BROMIDE AND ALBUTEROL SULFATE 2.5; .5 MG/3ML; MG/3ML
3 SOLUTION RESPIRATORY (INHALATION) EVERY 6 HOURS
Refills: 0 | Status: DISCONTINUED | OUTPATIENT
Start: 2024-12-06 | End: 2024-12-08

## 2024-12-06 RX ORDER — BUTALB/ACETAMINOPHEN/CAFFEINE 50-325-40
1 TABLET ORAL EVERY 6 HOURS
Refills: 0 | Status: DISCONTINUED | OUTPATIENT
Start: 2024-12-06 | End: 2024-12-08

## 2024-12-06 RX ORDER — POTASSIUM CHLORIDE 600 MG/1
10 TABLET, EXTENDED RELEASE ORAL ONCE
Refills: 0 | Status: COMPLETED | OUTPATIENT
Start: 2024-12-06 | End: 2024-12-06

## 2024-12-06 RX ADMIN — TRAMADOL HYDROCHLORIDE 25 MILLIGRAM(S): 300 CAPSULE ORAL at 18:34

## 2024-12-06 RX ADMIN — Medication 1 CAPSULE(S): at 22:59

## 2024-12-06 RX ADMIN — Medication 105 MILLIGRAM(S): at 22:59

## 2024-12-06 RX ADMIN — IPRATROPIUM BROMIDE AND ALBUTEROL SULFATE 3 MILLILITER(S): 2.5; .5 SOLUTION RESPIRATORY (INHALATION) at 06:10

## 2024-12-06 RX ADMIN — Medication 20 MILLIGRAM(S): at 23:00

## 2024-12-06 RX ADMIN — Medication 1 CAPSULE(S): at 23:59

## 2024-12-06 RX ADMIN — Medication 2 TABLET(S): at 23:00

## 2024-12-06 RX ADMIN — TRAMADOL HYDROCHLORIDE 25 MILLIGRAM(S): 300 CAPSULE ORAL at 16:42

## 2024-12-06 RX ADMIN — ACETAMINOPHEN 500MG 650 MILLIGRAM(S): 500 TABLET, COATED ORAL at 19:38

## 2024-12-06 RX ADMIN — ACETAMINOPHEN 500MG 650 MILLIGRAM(S): 500 TABLET, COATED ORAL at 13:00

## 2024-12-06 RX ADMIN — POLYETHYLENE GLYCOL 3350 17 GRAM(S): 17 POWDER, FOR SOLUTION ORAL at 12:51

## 2024-12-06 RX ADMIN — LEVETIRACETAM 500 MILLIGRAM(S): 1000 TABLET ORAL at 18:25

## 2024-12-06 RX ADMIN — ENOXAPARIN SODIUM 40 MILLIGRAM(S): 30 INJECTION SUBCUTANEOUS at 23:00

## 2024-12-06 RX ADMIN — ACETAMINOPHEN 500MG 650 MILLIGRAM(S): 500 TABLET, COATED ORAL at 03:18

## 2024-12-06 RX ADMIN — ACETAMINOPHEN 500MG 650 MILLIGRAM(S): 500 TABLET, COATED ORAL at 10:09

## 2024-12-06 RX ADMIN — Medication 105 MILLIGRAM(S): at 14:21

## 2024-12-06 RX ADMIN — ACETAMINOPHEN 500MG 650 MILLIGRAM(S): 500 TABLET, COATED ORAL at 18:38

## 2024-12-06 RX ADMIN — LEVETIRACETAM 500 MILLIGRAM(S): 1000 TABLET ORAL at 06:10

## 2024-12-06 RX ADMIN — POTASSIUM CHLORIDE 10 MILLIEQUIVALENT(S): 600 TABLET, EXTENDED RELEASE ORAL at 10:09

## 2024-12-06 RX ADMIN — Medication 105 MILLIGRAM(S): at 06:10

## 2024-12-06 NOTE — DISCHARGE NOTE PROVIDER - NSDCFUSCHEDAPPT_GEN_ALL_CORE_FT
Luis M Tejeda  [facility]  [facility] [id number]  Scheduled Appointment: 02/08/2025

## 2024-12-06 NOTE — DISCHARGE NOTE PROVIDER - CARE PROVIDER_API CALL
Reza Espinosa  Neurosurgery  [address], [address] [facility] [place] [address]  Phone: [phone]  Fax: [phone]  Follow Up Time:

## 2024-12-06 NOTE — DISCHARGE NOTE PROVIDER - HOSPITAL COURSE
HPI: Ms. Ritter is a 45yo female patient, who had in the past two episodes of transient right sided tingling and numbness (first episode one year ago and second episode 6 months ago). Imaging workup showed two incidental bilateral ICA aneurysms. The left ICA aneurysm is a 4mm intradural paraopthalmic aneurysm, and the second one is a right, probably extradural, 5.8mm paraopthalmic aneurysm. The initial plan was to treat the left intradural aneurysm with a flow diverter, however the patient suffered heavy menstruation bleeding requiring hospitalization and transfusion while beeing on Aspirin and Plavix (for Stroke therapy). The dual antiplatelet regimen was then stopped. The case was discussed in our interdisciplinary vascular board, and the recommendation was made for surgical clipping of the left intradural ICA aneurysm.    Hospital Course:  12/3: POD0 L crani L paraopthalmic ICA aneurysm clipping. Pt AOx3, CASTRO, participating with exam. 20 min later, pt newly lethargic, no longer participating w exam, requiring deep noxious stimuli for w/d. not opening eyes, stroke code called. CTH w small SDH anterior to terence and compression of upper cervical cord c/f SDH. CTA head/neck, CTP negative. HFNC. abg improved. keppra inc to 1g BID.   12/4: POD 1. c/f non-anion gap metabolic acidosis, s/p bicarb 650mg PO. CXR: no acute pathology. SG CAMRON dc'd.   12/5: POD 2. CTA PE protocol complete. Neuro stable.CTA PE protocol negative, w L>R atelectasis. Dc'd high flow, switch to 6L NC. Keppra decreased to 500 BID. Placed on vEEG. SQL tonight. Prelim vEEG negative.   12/6: POD3. EEG negative for seizures, dc'd.     Patient evaluated by PT/OT who recommended:  Patient is going home? rehab? hospice? Facility Name:    Hospital course c/b: uncomplicated.     PHYSICAL EXAM:  General: NAD, pt is sitting up in bed  CV: RRR, S1, S2  Resp: breathing non-labored on RA, chest rise symmetric  GI: abd soft, NTND  Neuro: AOx3, PERRL 3mm briskly reactive, EOMI b/l, (+) visual fields intact, face symmetric, tongue midline, no aphasia, speech clear, no dysmetria, no pronator drift. Follows commands.  CASTRO x4 spontaneously. 5/5 strength in all extremities throughout. Sensation intact.   Extremities: distal pulses 2+ x4.  Wound/incision: left crani incision with sutures in place opened to air c/d/i. Left neck incision with steri strips in lace; right groin with steri strips in place, soft, no hematoma.     Patient is neuro stable, vitals stable, medically ready for discharge.    HPI: Ms. Ritter is a 43yo female patient, who had in the past two episodes of transient right sided tingling and numbness (first episode one year ago and second episode 6 months ago). Imaging workup showed two incidental bilateral ICA aneurysms. The left ICA aneurysm is a 4mm intradural paraopthalmic aneurysm, and the second one is a right, probably extradural, 5.8mm paraopthalmic aneurysm. The initial plan was to treat the left intradural aneurysm with a flow diverter, however the patient suffered heavy menstruation bleeding requiring hospitalization and transfusion while beeing on Aspirin and Plavix (for Stroke therapy). The dual antiplatelet regimen was then stopped. The case was discussed in our interdisciplinary vascular board, and the recommendation was made for surgical clipping of the left intradural ICA aneurysm.    Hospital Course:  12/3: POD0 L crani L paraopthalmic ICA aneurysm clipping. Pt AOx3, CASTRO, participating with exam. 20 min later, pt newly lethargic, no longer participating w exam, requiring deep noxious stimuli for w/d. not opening eyes, stroke code called. CTH w small SDH anterior to terence and compression of upper cervical cord c/f SDH. CTA head/neck, CTP negative. HFNC. abg improved. keppra inc to 1g BID.   12/4: POD 1. c/f non-anion gap metabolic acidosis, s/p bicarb 650mg PO. CXR: no acute pathology. SG CAMRON dc'd.   12/5: POD 2. CTA PE protocol complete. Neuro stable.CTA PE protocol negative, w L>R atelectasis. Dc'd high flow, switch to 6L NC. Keppra decreased to 500 BID. Placed on vEEG. SQL tonight. Prelim vEEG negative.   12/6: POD3. EEG negative for seizures, dc'd.   12/7: POD4, DARRIN overnight, neuro stable. Given lactulose, pending BM.   12/8: POD5, DARRIN overnight, neuro stable.     Patient evaluated by PT/OT who recommended: Home PT, OT no needs  Patient will be going home.     Hospital course c/b: uncomplicated.     PHYSICAL EXAM:  General: NAD, pt is sitting up in bed  CV: RRR, S1, S2  Resp: breathing non-labored on RA, chest rise symmetric  GI: abd soft, NTND  Neuro: AOx3, PERRL 3mm briskly reactive, EOMI b/l, (+) visual fields intact, face symmetric, tongue midline, no aphasia, speech clear, no dysmetria, no pronator drift. Follows commands.  CASTRO x4 spontaneously. 5/5 strength in all extremities throughout. Sensation intact.   Extremities: distal pulses 2+ x4.  Wound/incision: left crani incision with sutures in place opened to air c/d/i. Left neck incision with steri strips in lace; right groin with steri strips in place, soft, no hematoma.     Patient is neuro stable, vitals stable, medically ready for discharge.

## 2024-12-06 NOTE — DISCHARGE NOTE PROVIDER - NSDCFUADDINST_GEN_ALL_CORE_FT
Neurosurgery follow up appointment date/time:  - Your sutures will be removed at your follow up appointment. d  - what day should staples/sutures be removed ([id number])?   - please call the office to confirm appointment:     Groin Wound Care:  - remove groin dressing tomorrow  - steri strips underneath will fall off on their own   - you may shower tomorrow  - if you received a "closure device" in your groin, no bathing or swimming for 5 days (any closure besides manual compression)    Cranical Wound Care:  - can patient shower?  - does dressing need to be changed/removed?  - no picking at incision   - pressure ulcer    Devices/Drains/Lines:   - does patient need colalr or brace or helmet?  - does collar/brace need to be worn at all times or just when OOB  - RW or cane for ambulation:  - PICC in place? ID follow up? (Paper rx for: antibiotics, heparin flush, weekly labs)  - CAMRON in place? management/plastics follow?  - marshall in place? management/urology follow up?  - Tracheostomy?   - PEG tube?    Activity:  - fatigue is common after surgery, rest if you feel tired   - no bending, lifting, twisting   - walking is recommended, ambulate as tolerated  - you may shower at home/rehab, keep your groin and cranial site incision dry   - no soaking in a tub/pool/hot tub  - no driving within 24 hours of anesthesia or while taking prescription pain medications   - keep hydrated, drink plenty of water   - if your wrist was used to access, do not lift more than 5lbs for 3 days  - if groin access, do not lift more than 10lbs for 5 days    Diet:  - You may resume your regular diet.  - Drinking extra fluids (water, juice) is encouraged.  - Abstain from alcohol for 24 hours.    Inpatient consults:  - final recommendations  - you will need follow up with....    Please also follow up with your primary care doctor.     Pain Expectations:  - A mild pain at the puncture site is not unusual.  - You may take Tylenol 1-2 tabs every 4-6 hours as needed   - If the pain persists after 24 hours contact the office at [phone]    Medications:  - changes to home meds (ex. AED's)?  *HOLD Metformin, diuretic, ARB's for 2 days after angiogram   - new meds?  - pain meds?  - when can antiplatelets or anticoagulants be restarted?  - were adverse affects of meds discussed with patients?   - pain medications can cause constipation, you should eat a high fiber diet and may take a stool softener while on pain meds     Call the office or come to the ED if:  - wound has drainage or bleeding, increased redness or pain at incisoin site, neurological change, fever (>101), chills, night sweats, syncope, nausea/vomiting, chest pain, shortness of breath    SPECIAL INSTRUCTIONS S/P ANGIOGRAPHY:  Signs and symptoms to look out for:    1. Aleksandr bleeding from the puncture site is an emergency. Put direct pressure on the site and go directly to your local Emergency Room for treatment.    2. Bleeding under the skin may also occur and a small "black and blue" may be expected. If there appears to be an expanding mass or swelling around the puncture site, apply manual compression and go immediately to your local Emergency Room for treatment.    3. If your foot/leg or hand/arm (puncture site) becomes cool or blue and/or you are unable to move it, this must be treated as an emergency. Go directly to your local Emergency Room for treatment.    4. Excessive puncture site pain is abnormal and should be assessed.    5.  Look out for signs of infection in the puncture site: fever, red streaking of the leg or wrist, drainage, severe pain.    6.  Lack of adequate urine output, provided you are drinking enough fluids, may be cause for concern, notify us if you think this is the case.    Playback:  - are discharge instruction on playback?  - is a picture of the incision on playback?     WITHIN 24 HOURS OF DISCHARGE, PLEASE CONTACT NEURO PA  WITH ANY QUESTIONS OR CONCERNS: [phone]   OTHERWISE, PLEASE CALL THE OFFICE WITH ANY QUESTIONS OR CONCERNS:  Neurosurgery follow up appointment date/time: Call [phone] to confirm appointment.   - Your sutures will be removed at your follow up appointment.   - please call the office to confirm appointment:     Groin Wound Care:  - remove groin dressing tomorrow  - steri strips underneath will fall off on their own   - you may shower tomorrow  - if you received a "closure device" in your groin, no bathing or swimming for 5 days (any closure besides manual compression)    Cranial Wound Care:  - Please shower and wash hair daily to prevent infection. Wash hair with shampoo, no conditioner. Pat incision dry with clean towel and leave opened to air. No hair products, hair dye or hot tools.   - no picking at incision     Activity:  - fatigue is common after surgery, rest if you feel tired   - no bending, lifting, twisting   - walking is recommended, ambulate as tolerated  - you may shower at home/rehab, keep your groin and cranial site incision dry   - no soaking in a tub/pool/hot tub  - no driving within 24 hours of anesthesia or while taking prescription pain medications   - keep hydrated, drink plenty of water   - Do not lift more than 10lbs for 5 days    Diet:  - You may resume your regular diet.  - Drinking extra fluids (water, juice) is encouraged.  - Abstain from alcohol for 24 hours.    Please also follow up with your primary care doctor.     Pain Expectations:  - A mild pain at the puncture site is not unusual.  - You may take Tylenol 1-2 tabs every 4-6 hours as needed for mild to moderate pain (max dose 4000mg/day)  - Fioricet 1 tab every 6 hours as needed for headaches   - You may take Tramadol 25mg every 6 hours as needed for severe pain   - If the pain persists after 24 hours contact the office at [phone]  - pain medications can cause constipation, you should eat a high fiber diet and may take a stool softener while on pain meds     Medications:  - New meds: Keppra 500mg every 12 hours for seizure prophylaxis. (may cause sleepiness or agitation)   - Continue home meds as prescribed: lipitor, albuterol     Call the office or come to the ED if:  - wound has drainage or bleeding, increased redness or pain at incision site, neurological change, fever (>101), chills, night sweats, syncope, nausea/vomiting, chest pain, shortness of breath    SPECIAL INSTRUCTIONS S/P ANGIOGRAPHY:  Signs and symptoms to look out for:    1. Aleksandr bleeding from the puncture site is an emergency. Put direct pressure on the site and go directly to your local Emergency Room for treatment.    2. Bleeding under the skin may also occur and a small "black and blue" may be expected. If there appears to be an expanding mass or swelling around the puncture site, apply manual compression and go immediately to your local Emergency Room for treatment.    3. If your foot/leg or hand/arm (puncture site) becomes cool or blue and/or you are unable to move it, this must be treated as an emergency. Go directly to your local Emergency Room for treatment.    4. Excessive puncture site pain is abnormal and should be assessed.    5.  Look out for signs of infection in the puncture site: fever, red streaking of the leg or wrist, drainage, severe pain.    6.  Lack of adequate urine output, provided you are drinking enough fluids, may be cause for concern, notify us if you think this is the case.    Playback:  - a copy of your playback instructions is on playback.     WITHIN 24 HOURS OF DISCHARGE, PLEASE CONTACT NEURO PA  WITH ANY QUESTIONS OR CONCERNS: [phone]   OTHERWISE, PLEASE CALL THE OFFICE WITH ANY QUESTIONS OR CONCERNS: [phone] Neurosurgery follow up appointment date/time: Call [phone] to confirm appointment.   - Your staples will be removed at your follow up appointment.   - Your steri-strips on neck incision will fall off on own. Leave incision opened to air.  - please call the office to confirm appointment:     Groin Wound Care:  - remove groin dressing tomorrow  - steri strips underneath will fall off on their own   - you may shower tomorrow  - if you received a "closure device" in your groin, no bathing or swimming for 5 days (any closure besides manual compression)    Cranial Wound Care:  - Please shower and wash hair daily to prevent infection. Wash hair with shampoo, no conditioner. Pat incision dry with clean towel and leave opened to air. No hair products, hair dye or hot tools.   - no picking at incision     Activity:  - fatigue is common after surgery, rest if you feel tired   - no bending, lifting, twisting   - walking is recommended, ambulate as tolerated  - you may shower at home/rehab, keep your groin and cranial site incision dry   - no soaking in a tub/pool/hot tub  - no driving within 24 hours of anesthesia or while taking prescription pain medications   - keep hydrated, drink plenty of water   - Do not lift more than 10lbs for 5 days    Diet:  - You may resume your regular diet.  - Drinking extra fluids (water, juice) is encouraged.  - Abstain from alcohol for 24 hours.    Please also follow up with your primary care doctor.     Pain Expectations:  - A mild pain at the puncture site is not unusual.  - You may take Tylenol 1-2 tabs every 4-6 hours as needed for mild to moderate pain (max dose 4000mg/day)  - Fioricet 1 tab every 6 hours as needed for headaches   - You may take Tramadol 25mg every 6 hours as needed for severe pain   - If the pain persists after 24 hours contact the office at [phone]  - pain medications can cause constipation, you should eat a high fiber diet and may take a stool softener while on pain meds     Medications:  - New meds: Keppra 500mg every 12 hours for seizure prophylaxis. (may cause sleepiness or agitation)   - Continue home meds as prescribed: lipitor, albuterol     Call the office or come to the ED if:  - wound has drainage or bleeding, increased redness or pain at incision site, neurological change, fever (>101), chills, night sweats, syncope, nausea/vomiting, chest pain, shortness of breath    SPECIAL INSTRUCTIONS S/P ANGIOGRAPHY:  Signs and symptoms to look out for:    1. Aleksandr bleeding from the puncture site is an emergency. Put direct pressure on the site and go directly to your local Emergency Room for treatment.    2. Bleeding under the skin may also occur and a small "black and blue" may be expected. If there appears to be an expanding mass or swelling around the puncture site, apply manual compression and go immediately to your local Emergency Room for treatment.    3. If your foot/leg or hand/arm (puncture site) becomes cool or blue and/or you are unable to move it, this must be treated as an emergency. Go directly to your local Emergency Room for treatment.    4. Excessive puncture site pain is abnormal and should be assessed.    5.  Look out for signs of infection in the puncture site: fever, red streaking of the leg or wrist, drainage, severe pain.    6.  Lack of adequate urine output, provided you are drinking enough fluids, may be cause for concern, notify us if you think this is the case.    Playback:  - a copy of your playback instructions is on playback.     WITHIN 24 HOURS OF DISCHARGE, PLEASE CONTACT NEURO PA  WITH ANY QUESTIONS OR CONCERNS: [phone]   OTHERWISE, PLEASE CALL THE OFFICE WITH ANY QUESTIONS OR CONCERNS: [phone] Neurosurgery follow up appointment date/time: Call [phone] to confirm appointment.   - Your staples will be removed at your follow up appointment.   - Your steri-strips on neck incision will fall off on own. Leave incision opened to air.  - Your steri-strips on right groin will fall off on own. Leave incision opened to air.   - please call the office to confirm appointment: [phone]    Groin Wound Care:  - remove groin dressing tomorrow  - steri strips underneath will fall off on their own   - you may shower tomorrow  - if you received a "closure device" in your groin, no bathing or swimming for 5 days (any closure besides manual compression)    Cranial Wound Care:  - Please shower and wash hair daily to prevent infection. Wash hair with shampoo, no conditioner. Pat incision dry with clean towel and leave opened to air. No hair products, hair dye or hot tools.   - no picking at incision     Activity:  - fatigue is common after surgery, rest if you feel tired   - no bending, lifting, twisting   - walking is recommended, ambulate as tolerated  - you may shower at home/rehab, keep your groin and cranial site incision dry   - no soaking in a tub/pool/hot tub  - no driving within 24 hours of anesthesia or while taking prescription pain medications   - keep hydrated, drink plenty of water   - Do not lift more than 10lbs for 5 days    Diet:  - You may resume your regular diet.  - Drinking extra fluids (water, juice) is encouraged.  - Abstain from alcohol for 24 hours.    Please also follow up with your primary care doctor.     Pain Expectations:  - A mild pain at the puncture site is not unusual.  - You may take Tylenol 1-2 tabs every 4-6 hours as needed for mild to moderate pain (max dose 4000mg/day)  - Fioricet 1 tab every 6 hours as needed for headaches   - You may take Tramadol 25mg every 6 hours as needed for severe pain   - If the pain persists after 24 hours contact the office at [phone]  - pain medications can cause constipation, you should eat a high fiber diet and may take a stool softener while on pain meds     Medications:  - New meds: Keppra 500mg every 12 hours for seizure prophylaxis. (may cause sleepiness or agitation)   - Continue home meds as prescribed: lipitor, albuterol     Call the office or come to the ED if:  - wound has drainage or bleeding, increased redness or pain at incision site, neurological change, fever (>101), chills, night sweats, syncope, nausea/vomiting, chest pain, shortness of breath    SPECIAL INSTRUCTIONS S/P ANGIOGRAPHY:  Signs and symptoms to look out for:    1. Aleksandr bleeding from the puncture site is an emergency. Put direct pressure on the site and go directly to your local Emergency Room for treatment.    2. Bleeding under the skin may also occur and a small "black and blue" may be expected. If there appears to be an expanding mass or swelling around the puncture site, apply manual compression and go immediately to your local Emergency Room for treatment.    3. If your foot/leg or hand/arm (puncture site) becomes cool or blue and/or you are unable to move it, this must be treated as an emergency. Go directly to your local Emergency Room for treatment.    4. Excessive puncture site pain is abnormal and should be assessed.    5.  Look out for signs of infection in the puncture site: fever, red streaking of the leg or wrist, drainage, severe pain.    6.  Lack of adequate urine output, provided you are drinking enough fluids, may be cause for concern, notify us if you think this is the case.    Playback:  - a copy of your playback instructions is on playback.     WITHIN 24 HOURS OF DISCHARGE, PLEASE CONTACT NEURO PA  WITH ANY QUESTIONS OR CONCERNS: [phone]   OTHERWISE, PLEASE CALL THE OFFICE WITH ANY QUESTIONS OR CONCERNS: [phone]

## 2024-12-06 NOTE — EEG REPORT - NS EEG TEXT BOX
[facility] Department of Neurology  Inpatient Continuous video-Electroencephalogram  [address], [address] [place] [id number], T: [phone]    Patient Name:	ROMY ROSE    :	1980  MRN:	[id number]    Study Start Date/Time:	2024, 11:23:23 AM  Study End Date/Time: in progress    Referred by:  Reza Espinosa MD    Brief Clinical History:  ROMY ROSE is a 44-year-old woman s/p aneurysm repair; study performed to investigate for seizures or markers of epilepsy.     Diagnosis Code:  R56.9 convulsions/seizure    Pertinent Medication:  Keppra 500 mg BID    Acquisition Details:  Electroencephalography was acquired using a minimum of 21 channels on an [employer] system v 9.3.1 with electrode placement according to the standard International 10-20 system following ACNS (American Clinical Neurophysiology Society) guidelines.  Anterior temporal T1 and T2 electrodes were utilized whenever possible.  The [employer] automated spike & seizure detections were all reviewed in detail, in addition to the entire raw EEG.    Findings:    Day 1:  2024, 11:23:23 AM to next morning at 07:00 AM     Background:  continuous, with predominantly alpha and beta frequencies.  Generalized Slowing:  None  Symmetry/Focality: Occasional (1-9%) left temporal polymorphic theta and delta frequency slowing.  Voltage:  Normal (20+ uV)  Organization:  Appropriate anterior-posterior gradient  Posterior Dominant Rhythm:  No clear PDR obscured by excess beta  Sleep:  Symmetric, synchronous spindles and K complexes.  Variability:   Yes		Reactivity:  Yes    Spontaneous Activity:  No epileptiform discharges     Events:  •	No electrographic seizures or significant clinical events occurred during this study.  Provocations:  •	Hyperventilation: was not performed.  •	Photic stimulation: was not performed.    Daily Summary:    •	Occasional left temporal focal slowing, indicating focal cerebral dysfunction in this region.  •	There were no findings of active epilepsy, however this alone does not rule out the diagnosis.         Maribel Anderson MD  Attending Neurologist, [facility]

## 2024-12-06 NOTE — CONSULT NOTE ADULT - SUBJECTIVE AND OBJECTIVE BOX
Patient is a 44y old  Female who presents with a chief complaint of 43 yo female patient presenting for an elective left pterional craniotomy and clipping of an incidental left paraopthalmic aneurysm of 4mm. (06 Dec 2024 07:39)      HPI:  43 yo female patient with PMH of HLD, anxiety, TIAs, bilateral ICA aneurysms found incidentally on TIA w/u, presenting for elective L ICA aneurysm clipping. Now s/p L crani for L paraopthalmic ICA aneurysm clipping with intra-operative angiogram showing complete obliteration (12/3)    This AM feeling well save for intermittent slight head discomfort, denies CP/SOB/F/C.    Last Bowel Movement: 02-Dec-2024 (12-05)        PAST MEDICAL & SURGICAL HISTORY:  Asthma      Dust allergy      Gastritis      Brain aneurysm      H/O endoscopy            Allergies    No Known Allergies    Intolerances        FAMILY HISTORY:  Family history of prostate cancer (Father)        Social History:      Home Medications:  Albuterol (Eqv-ProAir HFA) 90 mcg/inh inhalation aerosol: 2 puff(s) inhaled every 6 hours as needed for  shortness of breath and/or wheezing (03 Dec 2024 11:33)  atorvastatin 20 mg oral tablet: 1 tab(s) orally once a day (at bedtime) (03 Dec 2024 11:33)  D3-50 1250 mcg (50,000 intl units) oral capsule: 1 cap(s) orally once a week (03 Dec 2024 11:33)  iron---- 2x a day 325mg:  (03 Dec 2024 11:33)          CURRENT  MEDICATIONS:   acetaminophen     Tablet .. 650 milliGRAM(s) Oral every 6 hours PRN  albuterol    90 MICROgram(s) HFA Inhaler 2 Puff(s) Inhalation every 6 hours PRN  albuterol/ipratropium for Nebulization 3 milliLiter(s) Nebulizer every 6 hours PRN  atorvastatin 20 milliGRAM(s) Oral at bedtime  enoxaparin Injectable 40 milliGRAM(s) SubCutaneous <User Schedule>  levETIRAcetam 500 milliGRAM(s) Oral every 12 hours  ondansetron Injectable 4 milliGRAM(s) IV Push every 6 hours PRN  polyethylene glycol 3350 17 Gram(s) Oral daily  senna 2 Tablet(s) Oral at bedtime  thiamine IVPB 500 milliGRAM(s) IV Intermittent every 8 hours  traMADol 25 milliGRAM(s) Oral every 6 hours PRN       Diet, Consistent Carbohydrate/No Snacks (12-04-24 @ 16:18) [Active]          VITAL SIGNS, INS/OUTS (last 24 hours):  Vital Signs Last 24 Hrs  T(C): 37.2 (06 Dec 2024 09:00), Max: 37.4 (06 Dec 2024 05:00)  T(F): 98.9 (06 Dec 2024 09:00), Max: 99.4 (06 Dec 2024 05:00)  HR: 70 (06 Dec 2024 08:56) (57 - 73)  BP: 99/56 (06 Dec 2024 08:56) (96/55 - 114/57)  BP(mean): 75 (06 Dec 2024 08:56) (70 - 82)  RR: 17 (06 Dec 2024 08:56) (14 - 35)  SpO2: 96% (06 Dec 2024 08:56) (94% - 100%)    Parameters below as of 06 Dec 2024 08:56  Patient On (Oxygen Delivery Method): room air      I&O's Summary    05 Dec 2024 07:01  -  06 Dec 2024 07:00  --------------------------------------------------------  IN: 2460 mL / OUT: 1400 mL / NET: 1060 mL        Physical Exam   GENERAL: NAD, lying in bed comfortably  CHEST/LUNG: Clear to auscultation bilaterally; No rales, rhonchi, wheezing, or rubs. Unlabored respirations  HEART: Regular rate and rhythm; No murmurs, rubs, or gallops  ABDOMEN: Bowel sounds present; Soft, Nontender, Nondistended. No hepatomegaly  EXTREMITIES:  WWP,  No clubbing, cyanosis, or edema  NERVOUS SYSTEM:  Alert & Oriented X3, speech clear. Non focal exam  SKIN: No rashes or lesions    LABS:                        10.3   11.89 )-----------( 312      ( 06 Dec 2024 05:56 )             32.8     12-06    137  |  105  |  15  ----------------------------<  121[H]  3.9   |  23  |  0.47[L]    Ca    8.6      06 Dec 2024 05:56  Phos  3.3     12-06  Mg     2.2     12-06    TPro  7.0  /  Alb  4.0  /  TBili  0.9  /  DBili  x   /  AST  24  /  ALT  18  /  AlkPhos  79  12-04      Urinalysis Basic - ( 06 Dec 2024 05:56 )    Color: x / Appearance: x / SG: x / pH: x  Gluc: 121 mg/dL / Ketone: x  / Bili: x / Urobili: x   Blood: x / Protein: x / Nitrite: x   Leuk Esterase: x / RBC: x / WBC x   Sq Epi: x / Non Sq Epi: x / Bacteria: x      CAPILLARY BLOOD GLUCOSE      POCT Blood Glucose.: 154 mg/dL (05 Dec 2024 21:55)  POCT Blood Glucose.: 128 mg/dL (05 Dec 2024 17:27)  POCT Blood Glucose.: 139 mg/dL (05 Dec 2024 11:33)

## 2024-12-06 NOTE — CONSULT NOTE ADULT - ASSESSMENT
45 yo female patient with PMH of HLD, anxiety, TIAs, bilateral ICA aneurysms found incidentally on TIA w/u, presenting for elective L ICA aneurysm clipping. Now s/p L crani for L paraopthalmic ICA aneurysm clipping with intra-operative angiogram showing complete obliteration (12/3), likely medically ready later today.    #sp aneurysm clipping  - pain control per primary  - seizure ppx: keppra 500mg bid   - optic nerve edema: s/p dex x 48 hours   - s/p removal SG CAMRON x1 (12/4)  - CTH 12/3 w small SDH anterior to terence and compression of upper cervical cord c/f SDH  - CTA head/neck, CTP 12/3 negative   - s/p left neck incision intra-op cervical ICA control, monitor neck for s/s hematoma  - vEEG (12/5-12/6) negative for seizures    #HLD  - cont home lipitor 20mg     #hx asthma  - cont home albuterol prn  - duonebs q6 prn  - CTA PE protocol negative for PE 12/5    #non-anion gap metabolic acidosis on admission, resolved  - thiamine q8 x3 days  - s/p 650mg bicarb (12/4)    #DVT ppx: SCDs, SQL     Dispo: SDU, full code, dispo pending, PT/OT recs TBD  
44y Female with hx of asthma and bilateral ICA aneurysm presents to [facility] for elective clipping of 4mm left paraophthalmic aneurysm. Post op at 920pm patient was A&Ox3 moving extremities and participating with exam. Around 945pm RN noted patient was lethargic, requiring deep noxious stimulus to arouse, not opening her eyes, RUE 0/5, RLE trace withdrawal, left side 4/5. Stroke code was called. NIHSS 21, patient largely non focal on exam, was noted to have some spontaneous LUE movement at time of code. CTH with subdural hematoma anterior to terence, compression of visualized upper cervical cord with surrounding hyperdense material s/f SDH, no acute infarct. After CT scans, patient was more arousable but still lethargic, asking for water, able to stick her tongue out and lift up arms to commands. Pt found to be hypoxic without being hypercapnic on ABG. Was started on hi pancho with improvement. Given Keppra 1g load.     Unclear etiology of AMS, may be due to hypoxia as mental status improved with hi pancho.    Recommend:  - plan for post op non con head CT in AM  - would place vEEG to eval for seizure    Case discussed with Neurology Attending Dr. Alcala

## 2024-12-06 NOTE — DISCHARGE NOTE PROVIDER - NSDCCPTREATMENT_GEN_ALL_CORE_FT
PRINCIPAL PROCEDURE  Procedure: Craniotomy for aneurysm repair  Findings and Treatment: Left-sided      SECONDARY PROCEDURE  Procedure: Angiogram, cerebral, unilateral  Findings and Treatment: Intra operative angiogram

## 2024-12-06 NOTE — DISCHARGE NOTE PROVIDER - NSDCFUADDAPPT_GEN_ALL_CORE_FT
Please follow up with Dr. Espinosa at [address] [place] [address] on ___. Call [phone] to confirm appointment.     Please also follow up with primary call provider.      Please follow up with Dr. Espinosa outpatient. Call [phone] to confirm appointment.     Please also follow up with primary call provider.

## 2024-12-06 NOTE — PROGRESS NOTE ADULT - SUBJECTIVE AND OBJECTIVE BOX
HPI:  Ms. Ritter is a 45yo female patient, who had in the past two episodes of transient right sided tingling and numbness (first episode one year ago and second episode 6 months ago). Imaging workup showed two incidental bilateral ICA aneurysms. The left ICA aneurysm is a 4mm intradural paraopthalmic aneurysm, and the second one is a right, probably extradural, 5.8mm paraopthalmic aneurysm. The initial plan was to treat the left intradural aneurysm with a flow diverter, however the patient suffered heavy menstruation bleeding requiring hospitalization and transfusion while beeing on Aspirin and Plavix (for Stroke therapy). The dual antiplatelet regimen was then stopped. The case was discussed in our interdisciplinary vascular board, and the recommendation was made for surgical clipping of the left intradural ICA aneurysm. (03 Dec 2024 08:30)    S/OVERNIGHT EVENTS: 12/6: POD3. DARRIN overnight. Pt was examined on neurosurgery floor. Pt reports no neurological complaints. Pt denies headaches, dizziness, vision changes, numbness weakness, chest pain, palpitations, nausea, or vomiting.     Hospital Course:   12/3: POD0 L crani L paraopthalmic ICA aneurysm clipping. Pt AOx3, CASTRO, participating with exam. 20 min later, pt newly lethargic, no longer participating w exam, requiring deep noxious stimuli for w/d. not opening eyes, stroke code called. CTH w small SDH anterior to terence and compression of upper cervical cord c/f SDH. CTA head/neck, CTP negative. HFNC. abg improved. keppra inc to 1g BID.   12/4: POD 1. c/f non-anion gap metabolic acidosis, s/p bicarb 650mg PO. CXR: no acute pathology. SG CAMRON dc'd.   12/5: POD 2. CTA PE protocol complete. Neuro stable.CTA PE protocol negative, w L>R atelectasis. Dc'd high flow, switch to 6L NC. Keppra decreased to 500 BID. Placed on vEEG. SQL tonight. Prelim vEEG negative.   12/6: POD3    Vital Signs Last 24 Hrs  T(C): 37.4 (06 Dec 2024 05:00), Max: 37.4 (06 Dec 2024 05:00)  T(F): 99.4 (06 Dec 2024 05:00), Max: 99.4 (06 Dec 2024 05:00)  HR: 64 (06 Dec 2024 03:51) (57 - 74)  BP: 114/57 (06 Dec 2024 03:51) (96/55 - 114/57)  BP(mean): 80 (06 Dec 2024 03:51) (70 - 83)  RR: 18 (06 Dec 2024 03:51) (14 - 35)  SpO2: 95% (06 Dec 2024 03:51) (94% - 100%)    Parameters below as of 06 Dec 2024 03:51  Patient On (Oxygen Delivery Method): room air    I&O's Summary    05 Dec 2024 07:01  -  06 Dec 2024 07:00  --------------------------------------------------------  IN: 2460 mL / OUT: 1400 mL / NET: 1060 mL      PHYSICAL EXAM:  General: NAD, pt is sitting up in bed  HEENT: PERRL 3mm briskly reactive, EOMI b/l, (+) visual fields intact to finger counting, face symmetric, tongue midline, neck FROM  CV: RRR, S1, S2  Resp: breathing non-labored on RA, chest rise symmetric  GI: abd soft, NTND  Neuro: AOx3, no aphasia, speech clear, no dysmetria, no pronator drift. Follows commands.  CASTRO x4 spontaneously. 5/5 strength in all extremities throughout. Sensation intact.   Extremities: distal pulses 2+ x4.  Wound/incision: left crani incision with dressing c/d/i; left neck incision w dressing c/d/i, soft, w/o hematoma        DIET:  [] NPO  [x] Mechanical  [] Tube feeds    LABS:                        10.3   11.89 )-----------( 312      ( 06 Dec 2024 05:56 )             32.8     12-06    137  |  105  |  15  ----------------------------<  121[H]  3.9   |  23  |  0.47[L]    Ca    8.6      06 Dec 2024 05:56  Phos  3.3     12-06  Mg     2.2     12-06    TPro  7.0  /  Alb  4.0  /  TBili  0.9  /  DBili  x   /  AST  24  /  ALT  18  /  AlkPhos  79  12-04      Urinalysis Basic - ( 06 Dec 2024 05:56 )    Color: x / Appearance: x / SG: x / pH: x  Gluc: 121 mg/dL / Ketone: x  / Bili: x / Urobili: x   Blood: x / Protein: x / Nitrite: x   Leuk Esterase: x / RBC: x / WBC x   Sq Epi: x / Non Sq Epi: x / Bacteria: x      CAPILLARY BLOOD GLUCOSE      POCT Blood Glucose.: 154 mg/dL (05 Dec 2024 21:55)  POCT Blood Glucose.: 128 mg/dL (05 Dec 2024 17:27)  POCT Blood Glucose.: 139 mg/dL (05 Dec 2024 11:33)    Allergies    No Known Allergies    Intolerances      MEDICATIONS:  Antibiotics:    Neuro:  acetaminophen     Tablet .. 650 milliGRAM(s) Oral every 6 hours PRN  levETIRAcetam 500 milliGRAM(s) Oral every 12 hours  ondansetron Injectable 4 milliGRAM(s) IV Push every 6 hours PRN  traMADol 25 milliGRAM(s) Oral every 6 hours PRN    Anticoagulation:  enoxaparin Injectable 40 milliGRAM(s) SubCutaneous <User Schedule>    OTHER:  albuterol    90 MICROgram(s) HFA Inhaler 2 Puff(s) Inhalation every 6 hours PRN  albuterol/ipratropium for Nebulization 3 milliLiter(s) Nebulizer every 6 hours PRN  atorvastatin 20 milliGRAM(s) Oral at bedtime  polyethylene glycol 3350 17 Gram(s) Oral daily  senna 2 Tablet(s) Oral at bedtime    IVF:  multiple electrolytes Injection Type 1 1000 milliLiter(s) IV Continuous <Continuous>  potassium chloride    Tablet ER 10 milliEquivalent(s) Oral once  thiamine IVPB 500 milliGRAM(s) IV Intermittent every 8 hours      ASSESSMENT:  45 yo female patient with PMH of HLD, anxiety, TIAs, bilateral ICA aneurysms found incidentally on TIA w/u, presenting for elective L ICA aneurysm clipping. Now s/p L crani for L paraopthalmic ICA aneurysm clipping with intra-operative angiogram showing complete obliteration (12/3).      PLAN:  Neuro:   - neuro/vitals q1h  - pain control: tylenol 1g q8 prn, tramadol 25mg q6 prn   - seizure ppx: keppra 500mg bid   - optic nerve edema: s/p dex x 48 hours   - s/p removal SG CAMRON x1 (12/4)  - CTH 12/3 w small SDH anterior to terence and compression of upper cervical cord c/f SDH  - CTA head/neck, CTP 12/3 negative   - s/p left neck incision intra-op cervical ICA control, monitor neck for s/s hematoma  - vEEG (12/5-    Cards:   - -140  - HLD: cont home lipitor 20mg     Pulm:   - RA  - IS  - hx asthma: cont home albuterol prn  - duonebs q6 prn  - CTA PE protocol negative for PE 12/5    GI:   - CCD  - bowel regimen    Renal:   - IVF i/s/o contrast from imaging  - Voiding  - hx hyponatremia   - non-anion gap metabolic acidosis: thiamine q8 x3 days, s/p 650mg bicarb (12/4)    Heme:   - h/h stable  - DVT ppx: SCDs, SQL     ID:   - afebrile    Endo:   - A1c 6.0%, ISS while on steroids    Dispo: SDU, full code, dispo pending, PT/OT recs TBD    D/w Dr. Espinosa     HPI:  Ms. Ritter is a 45yo female patient, who had in the past two episodes of transient right sided tingling and numbness (first episode one year ago and second episode 6 months ago). Imaging workup showed two incidental bilateral ICA aneurysms. The left ICA aneurysm is a 4mm intradural paraopthalmic aneurysm, and the second one is a right, probably extradural, 5.8mm paraopthalmic aneurysm. The initial plan was to treat the left intradural aneurysm with a flow diverter, however the patient suffered heavy menstruation bleeding requiring hospitalization and transfusion while beeing on Aspirin and Plavix (for Stroke therapy). The dual antiplatelet regimen was then stopped. The case was discussed in our interdisciplinary vascular board, and the recommendation was made for surgical clipping of the left intradural ICA aneurysm. (03 Dec 2024 08:30)    S/OVERNIGHT EVENTS: 12/6: POD3. DARRIN overnight. Pt was examined on neurosurgery floor. Pt reports headache and pain this morning from her incision site. She reports the medication has been improving the pain. She reports not having much of an appetite postop. She denies any other neurological complaints. Pt denies denies dizziness, vision changes, numbness weakness, chest pain, palpitations, shortness of breath nausea, or vomiting.     Hospital Course:   12/3: POD0 L crani L paraopthalmic ICA aneurysm clipping. Pt AOx3, CASTRO, participating with exam. 20 min later, pt newly lethargic, no longer participating w exam, requiring deep noxious stimuli for w/d. not opening eyes, stroke code called. CTH w small SDH anterior to terence and compression of upper cervical cord c/f SDH. CTA head/neck, CTP negative. HFNC. abg improved. keppra inc to 1g BID.   12/4: POD 1. c/f non-anion gap metabolic acidosis, s/p bicarb 650mg PO. CXR: no acute pathology. SG CAMRON dc'd.   12/5: POD 2. CTA PE protocol complete. Neuro stable.CTA PE protocol negative, w L>R atelectasis. Dc'd high flow, switch to 6L NC. Keppra decreased to 500 BID. Placed on vEEG. SQL tonight. Prelim vEEG negative.   12/6: POD3    Vital Signs Last 24 Hrs  T(C): 37.4 (06 Dec 2024 05:00), Max: 37.4 (06 Dec 2024 05:00)  T(F): 99.4 (06 Dec 2024 05:00), Max: 99.4 (06 Dec 2024 05:00)  HR: 64 (06 Dec 2024 03:51) (57 - 74)  BP: 114/57 (06 Dec 2024 03:51) (96/55 - 114/57)  BP(mean): 80 (06 Dec 2024 03:51) (70 - 83)  RR: 18 (06 Dec 2024 03:51) (14 - 35)  SpO2: 95% (06 Dec 2024 03:51) (94% - 100%)    Parameters below as of 06 Dec 2024 03:51  Patient On (Oxygen Delivery Method): room air    I&O's Summary    05 Dec 2024 07:01  -  06 Dec 2024 07:00  --------------------------------------------------------  IN: 2460 mL / OUT: 1400 mL / NET: 1060 mL      PHYSICAL EXAM:  General: NAD, pt is sitting up in bed  CV: RRR, S1, S2  Resp: breathing non-labored on RA, chest rise symmetric  GI: abd soft, NTND  Neuro: AOx3, PERRL 3mm briskly reactive, EOMI b/l, (+) visual fields intact, face symmetric, tongue midline, no aphasia, speech clear, no dysmetria, no pronator drift. Follows commands.  CASTRO x4 spontaneously. 5/5 strength in all extremities throughout. Sensation intact.   Extremities: distal pulses 2+ x4.  Wound/incision: left crani incision with dressing c/d/i; left neck incision dry and clean w/o discharge or erythema; right groin site dressing removed s/p DSA, site is clean and dry w/o discharge or erythema         DIET:  [] NPO  [x] Mechanical  [] Tube feeds    LABS:                        10.3   11.89 )-----------( 312      ( 06 Dec 2024 05:56 )             32.8     12-06    137  |  105  |  15  ----------------------------<  121[H]  3.9   |  23  |  0.47[L]    Ca    8.6      06 Dec 2024 05:56  Phos  3.3     12-06  Mg     2.2     12-06    TPro  7.0  /  Alb  4.0  /  TBili  0.9  /  DBili  x   /  AST  24  /  ALT  18  /  AlkPhos  79  12-04      Urinalysis Basic - ( 06 Dec 2024 05:56 )    Color: x / Appearance: x / SG: x / pH: x  Gluc: 121 mg/dL / Ketone: x  / Bili: x / Urobili: x   Blood: x / Protein: x / Nitrite: x   Leuk Esterase: x / RBC: x / WBC x   Sq Epi: x / Non Sq Epi: x / Bacteria: x      CAPILLARY BLOOD GLUCOSE      POCT Blood Glucose.: 154 mg/dL (05 Dec 2024 21:55)  POCT Blood Glucose.: 128 mg/dL (05 Dec 2024 17:27)  POCT Blood Glucose.: 139 mg/dL (05 Dec 2024 11:33)    Allergies    No Known Allergies    Intolerances      MEDICATIONS:  Antibiotics:    Neuro:  acetaminophen     Tablet .. 650 milliGRAM(s) Oral every 6 hours PRN  levETIRAcetam 500 milliGRAM(s) Oral every 12 hours  ondansetron Injectable 4 milliGRAM(s) IV Push every 6 hours PRN  traMADol 25 milliGRAM(s) Oral every 6 hours PRN    Anticoagulation:  enoxaparin Injectable 40 milliGRAM(s) SubCutaneous <User Schedule>    OTHER:  albuterol    90 MICROgram(s) HFA Inhaler 2 Puff(s) Inhalation every 6 hours PRN  albuterol/ipratropium for Nebulization 3 milliLiter(s) Nebulizer every 6 hours PRN  atorvastatin 20 milliGRAM(s) Oral at bedtime  polyethylene glycol 3350 17 Gram(s) Oral daily  senna 2 Tablet(s) Oral at bedtime    IVF:  multiple electrolytes Injection Type 1 1000 milliLiter(s) IV Continuous <Continuous>  potassium chloride    Tablet ER 10 milliEquivalent(s) Oral once  thiamine IVPB 500 milliGRAM(s) IV Intermittent every 8 hours      ASSESSMENT:  43 yo female patient with PMH of HLD, anxiety, TIAs, bilateral ICA aneurysms found incidentally on TIA w/u, presenting for elective L ICA aneurysm clipping. Now s/p L crani for L paraopthalmic ICA aneurysm clipping with intra-operative angiogram showing complete obliteration (12/3).      PLAN:  Neuro:   - neuro/vitals q1h  - pain control: tylenol 1g q8 prn, tramadol 25mg q6 prn   - seizure ppx: keppra 500mg bid   - optic nerve edema: s/p dex x 48 hours   - s/p removal SG CAMRON x1 (12/4)  - CTH 12/3 w small SDH anterior to terence and compression of upper cervical cord c/f SDH  - CTA head/neck, CTP 12/3 negative   - s/p left neck incision intra-op cervical ICA control, monitor neck for s/s hematoma  - vEEG (12/5-    Cards:   - -140  - HLD: cont home lipitor 20mg     Pulm:   - RA  - IS  - hx asthma: cont home albuterol prn  - duonebs q6 prn  - CTA PE protocol negative for PE 12/5    GI:   - CCD  - bowel regimen    Renal:   - IVF i/s/o contrast from imaging  - Voiding  - hx hyponatremia   - non-anion gap metabolic acidosis: thiamine q8 x3 days, s/p 650mg bicarb (12/4)    Heme:   - h/h stable  - DVT ppx: SCDs, SQL     ID:   - afebrile    Endo:   - A1c 6.0%, ISS while on steroids    Dispo: SDU, full code, dispo pending, PT/OT recs TBD    D/w Dr. Espinosa     HPI:  Ms. Ritter is a 43yo female patient, who had in the past two episodes of transient right sided tingling and numbness (first episode one year ago and second episode 6 months ago). Imaging workup showed two incidental bilateral ICA aneurysms. The left ICA aneurysm is a 4mm intradural paraopthalmic aneurysm, and the second one is a right, probably extradural, 5.8mm paraopthalmic aneurysm. The initial plan was to treat the left intradural aneurysm with a flow diverter, however the patient suffered heavy menstruation bleeding requiring hospitalization and transfusion while beeing on Aspirin and Plavix (for Stroke therapy). The dual antiplatelet regimen was then stopped. The case was discussed in our interdisciplinary vascular board, and the recommendation was made for surgical clipping of the left intradural ICA aneurysm. (03 Dec 2024 08:30)    S/OVERNIGHT EVENTS: 12/6: POD3. DARRIN overnight. Pt was examined on neurosurgery floor. Pt reports headache and pain this morning from her incision site. She reports the medication has been improving the pain. She reports not having much of an appetite postop. She denies any other neurological complaints. Pt denies denies dizziness, vision changes, numbness weakness, chest pain, palpitations, shortness of breath nausea, or vomiting.     Hospital Course:   12/3: POD0 L crani L paraopthalmic ICA aneurysm clipping. Pt AOx3, CASTRO, participating with exam. 20 min later, pt newly lethargic, no longer participating w exam, requiring deep noxious stimuli for w/d. not opening eyes, stroke code called. CTH w small SDH anterior to terence and compression of upper cervical cord c/f SDH. CTA head/neck, CTP negative. HFNC. abg improved. keppra inc to 1g BID.   12/4: POD 1. c/f non-anion gap metabolic acidosis, s/p bicarb 650mg PO. CXR: no acute pathology. SG CAMRON dc'd.   12/5: POD 2. CTA PE protocol complete. Neuro stable.CTA PE protocol negative, w L>R atelectasis. Dc'd high flow, switch to 6L NC. Keppra decreased to 500 BID. Placed on vEEG. SQL tonight. Prelim vEEG negative.   12/6: POD3    Vital Signs Last 24 Hrs  T(C): 37.4 (06 Dec 2024 05:00), Max: 37.4 (06 Dec 2024 05:00)  T(F): 99.4 (06 Dec 2024 05:00), Max: 99.4 (06 Dec 2024 05:00)  HR: 64 (06 Dec 2024 03:51) (57 - 74)  BP: 114/57 (06 Dec 2024 03:51) (96/55 - 114/57)  BP(mean): 80 (06 Dec 2024 03:51) (70 - 83)  RR: 18 (06 Dec 2024 03:51) (14 - 35)  SpO2: 95% (06 Dec 2024 03:51) (94% - 100%)    Parameters below as of 06 Dec 2024 03:51  Patient On (Oxygen Delivery Method): room air    I&O's Summary    05 Dec 2024 07:01  -  06 Dec 2024 07:00  --------------------------------------------------------  IN: 2460 mL / OUT: 1400 mL / NET: 1060 mL      PHYSICAL EXAM:  General: NAD, pt is sitting up in bed  CV: RRR, S1, S2  Resp: breathing non-labored on RA, chest rise symmetric  GI: abd soft, NTND  Neuro: AOx3, PERRL 3mm briskly reactive, EOMI b/l, (+) visual fields intact, face symmetric, tongue midline, no aphasia, speech clear, no dysmetria, no pronator drift. Follows commands.  CASTRO x4 spontaneously. 5/5 strength in all extremities throughout. Sensation intact.   Extremities: distal pulses 2+ x4.  Wound/incision: left crani incision with dressing c/d/i; left neck incision dry and clean w/o discharge or erythema; right groin site dressing removed s/p DSA, site is clean and dry w/o discharge or erythema         DIET:  [] NPO  [x] Mechanical  [] Tube feeds    LABS:                        10.3   11.89 )-----------( 312      ( 06 Dec 2024 05:56 )             32.8     12-06    137  |  105  |  15  ----------------------------<  121[H]  3.9   |  23  |  0.47[L]    Ca    8.6      06 Dec 2024 05:56  Phos  3.3     12-06  Mg     2.2     12-06    TPro  7.0  /  Alb  4.0  /  TBili  0.9  /  DBili  x   /  AST  24  /  ALT  18  /  AlkPhos  79  12-04      Urinalysis Basic - ( 06 Dec 2024 05:56 )    Color: x / Appearance: x / SG: x / pH: x  Gluc: 121 mg/dL / Ketone: x  / Bili: x / Urobili: x   Blood: x / Protein: x / Nitrite: x   Leuk Esterase: x / RBC: x / WBC x   Sq Epi: x / Non Sq Epi: x / Bacteria: x      CAPILLARY BLOOD GLUCOSE      POCT Blood Glucose.: 154 mg/dL (05 Dec 2024 21:55)  POCT Blood Glucose.: 128 mg/dL (05 Dec 2024 17:27)  POCT Blood Glucose.: 139 mg/dL (05 Dec 2024 11:33)    Allergies    No Known Allergies    Intolerances      MEDICATIONS:  Antibiotics:    Neuro:  acetaminophen     Tablet .. 650 milliGRAM(s) Oral every 6 hours PRN  levETIRAcetam 500 milliGRAM(s) Oral every 12 hours  ondansetron Injectable 4 milliGRAM(s) IV Push every 6 hours PRN  traMADol 25 milliGRAM(s) Oral every 6 hours PRN    Anticoagulation:  enoxaparin Injectable 40 milliGRAM(s) SubCutaneous <User Schedule>    OTHER:  albuterol    90 MICROgram(s) HFA Inhaler 2 Puff(s) Inhalation every 6 hours PRN  albuterol/ipratropium for Nebulization 3 milliLiter(s) Nebulizer every 6 hours PRN  atorvastatin 20 milliGRAM(s) Oral at bedtime  polyethylene glycol 3350 17 Gram(s) Oral daily  senna 2 Tablet(s) Oral at bedtime    IVF:  multiple electrolytes Injection Type 1 1000 milliLiter(s) IV Continuous <Continuous>  potassium chloride    Tablet ER 10 milliEquivalent(s) Oral once  thiamine IVPB 500 milliGRAM(s) IV Intermittent every 8 hours      ASSESSMENT:  43 yo female patient with PMH of HLD, anxiety, TIAs, bilateral ICA aneurysms found incidentally on TIA w/u, presenting for elective L ICA aneurysm clipping. Now s/p L crani for L paraopthalmic ICA aneurysm clipping with intra-operative angiogram showing complete obliteration (12/3).      PLAN:  Neuro:   - neuro/vitals q1h  - pain control: tylenol 1g q8 prn, tramadol 25mg q6 prn   - seizure ppx: keppra 500mg bid   - optic nerve edema: s/p dex x 48 hours   - s/p removal SG CAMRON x1 (12/4)  - CTH 12/3 w small SDH anterior to terence and compression of upper cervical cord c/f SDH  - CTA head/neck, CTP 12/3 negative   - s/p left neck incision intra-op cervical ICA control, monitor neck for s/s hematoma  - vEEG (12/5-12/6) negative for seizures     Cards:   - -140  - HLD: cont home lipitor 20mg     Pulm:   - RA  - IS  - hx asthma: cont home albuterol prn  - duonebs q6 prn  - CTA PE protocol negative for PE 12/5    GI:   - CCD  - bowel regimen    Renal:   - IVF i/s/o contrast from imaging  - Voiding  - hx hyponatremia   - non-anion gap metabolic acidosis: thiamine q8 x3 days, s/p 650mg bicarb (12/4)    Heme:   - h/h stable  - DVT ppx: SCDs, SQL     ID:   - afebrile    Endo:   - A1c 6.0%, ISS while on steroids    Dispo: SDU, full code, dispo pending, PT/OT recs TBD    D/w Dr. Espinosa     HPI:  Ms. Ritter is a 45yo female patient, who had in the past two episodes of transient right sided tingling and numbness (first episode one year ago and second episode 6 months ago). Imaging workup showed two incidental bilateral ICA aneurysms. The left ICA aneurysm is a 4mm intradural paraopthalmic aneurysm, and the second one is a right, probably extradural, 5.8mm paraopthalmic aneurysm. The initial plan was to treat the left intradural aneurysm with a flow diverter, however the patient suffered heavy menstruation bleeding requiring hospitalization and transfusion while beeing on Aspirin and Plavix (for Stroke therapy). The dual antiplatelet regimen was then stopped. The case was discussed in our interdisciplinary vascular board, and the recommendation was made for surgical clipping of the left intradural ICA aneurysm. (03 Dec 2024 08:30)    S/OVERNIGHT EVENTS: 12/6: POD3. DARRIN overnight. Pt was examined on neurosurgery floor. Pt reports headache and pain this morning from her incision site. She reports the medication has been improving the pain. She reports not having much of an appetite postop. She denies any other neurological complaints. Pt denies denies dizziness, vision changes, numbness weakness, chest pain, palpitations, shortness of breath nausea, or vomiting.     Hospital Course:   12/3: POD0 L crani L paraopthalmic ICA aneurysm clipping. Pt AOx3, CASTRO, participating with exam. 20 min later, pt newly lethargic, no longer participating w exam, requiring deep noxious stimuli for w/d. not opening eyes, stroke code called. CTH w small SDH anterior to terence and compression of upper cervical cord c/f SDH. CTA head/neck, CTP negative. HFNC. abg improved. keppra inc to 1g BID.   12/4: POD 1. c/f non-anion gap metabolic acidosis, s/p bicarb 650mg PO. CXR: no acute pathology. SG CAMRON dc'd.   12/5: POD 2. CTA PE protocol complete. Neuro stable.CTA PE protocol negative, w L>R atelectasis. Dc'd high flow, switch to 6L NC. Keppra decreased to 500 BID. Placed on vEEG. SQL tonight. Prelim vEEG negative.   12/6: POD3    Vital Signs Last 24 Hrs  T(C): 37.4 (06 Dec 2024 05:00), Max: 37.4 (06 Dec 2024 05:00)  T(F): 99.4 (06 Dec 2024 05:00), Max: 99.4 (06 Dec 2024 05:00)  HR: 64 (06 Dec 2024 03:51) (57 - 74)  BP: 114/57 (06 Dec 2024 03:51) (96/55 - 114/57)  BP(mean): 80 (06 Dec 2024 03:51) (70 - 83)  RR: 18 (06 Dec 2024 03:51) (14 - 35)  SpO2: 95% (06 Dec 2024 03:51) (94% - 100%)    Parameters below as of 06 Dec 2024 03:51  Patient On (Oxygen Delivery Method): room air    I&O's Summary    05 Dec 2024 07:01  -  06 Dec 2024 07:00  --------------------------------------------------------  IN: 2460 mL / OUT: 1400 mL / NET: 1060 mL      PHYSICAL EXAM:  General: NAD, pt is sitting up in bed  CV: RRR, S1, S2  Resp: breathing non-labored on RA, chest rise symmetric  GI: abd soft, NTND  Neuro: AOx3, PERRL 3mm briskly reactive, EOMI b/l, (+) visual fields intact, face symmetric, tongue midline, no aphasia, speech clear, no dysmetria, no pronator drift. Follows commands.  CASTRO x4 spontaneously. 5/5 strength in all extremities throughout. Sensation intact.   Extremities: distal pulses 2+ x4.  Wound/incision: left crani incision with dressing c/d/i; left neck incision dry and clean w/o discharge or erythema; right groin site dressing removed s/p DSA, site is clean and dry w/o discharge or erythema         DIET:  [] NPO  [x] Mechanical  [] Tube feeds    LABS:                        10.3   11.89 )-----------( 312      ( 06 Dec 2024 05:56 )             32.8     12-06    137  |  105  |  15  ----------------------------<  121[H]  3.9   |  23  |  0.47[L]    Ca    8.6      06 Dec 2024 05:56  Phos  3.3     12-06  Mg     2.2     12-06    TPro  7.0  /  Alb  4.0  /  TBili  0.9  /  DBili  x   /  AST  24  /  ALT  18  /  AlkPhos  79  12-04      Urinalysis Basic - ( 06 Dec 2024 05:56 )    Color: x / Appearance: x / SG: x / pH: x  Gluc: 121 mg/dL / Ketone: x  / Bili: x / Urobili: x   Blood: x / Protein: x / Nitrite: x   Leuk Esterase: x / RBC: x / WBC x   Sq Epi: x / Non Sq Epi: x / Bacteria: x      CAPILLARY BLOOD GLUCOSE      POCT Blood Glucose.: 154 mg/dL (05 Dec 2024 21:55)  POCT Blood Glucose.: 128 mg/dL (05 Dec 2024 17:27)  POCT Blood Glucose.: 139 mg/dL (05 Dec 2024 11:33)    Allergies    No Known Allergies    Intolerances      MEDICATIONS:  Antibiotics:    Neuro:  acetaminophen     Tablet .. 650 milliGRAM(s) Oral every 6 hours PRN  levETIRAcetam 500 milliGRAM(s) Oral every 12 hours  ondansetron Injectable 4 milliGRAM(s) IV Push every 6 hours PRN  traMADol 25 milliGRAM(s) Oral every 6 hours PRN    Anticoagulation:  enoxaparin Injectable 40 milliGRAM(s) SubCutaneous <User Schedule>    OTHER:  albuterol    90 MICROgram(s) HFA Inhaler 2 Puff(s) Inhalation every 6 hours PRN  albuterol/ipratropium for Nebulization 3 milliLiter(s) Nebulizer every 6 hours PRN  atorvastatin 20 milliGRAM(s) Oral at bedtime  polyethylene glycol 3350 17 Gram(s) Oral daily  senna 2 Tablet(s) Oral at bedtime    IVF:  multiple electrolytes Injection Type 1 1000 milliLiter(s) IV Continuous <Continuous>  potassium chloride    Tablet ER 10 milliEquivalent(s) Oral once  thiamine IVPB 500 milliGRAM(s) IV Intermittent every 8 hours      ASSESSMENT:  45 yo female patient with PMH of HLD, anxiety, TIAs, bilateral ICA aneurysms found incidentally on TIA w/u, presenting for elective L ICA aneurysm clipping. Now s/p L crani for L paraopthalmic ICA aneurysm clipping with intra-operative angiogram showing complete obliteration (12/3).      PLAN:  Neuro:   - neuro/vitals q1h  - pain control: tylenol 1g q8 prn, tramadol 25mg q6 prn   - seizure ppx: keppra 500mg bid   - optic nerve edema: s/p dex x 48 hours   - s/p removal SG CAMRON x1 (12/4)  - CTH 12/3 w small SDH anterior to terence and compression of upper cervical cord c/f SDH  - CTA head/neck, CTP 12/3 negative   - s/p left neck incision intra-op cervical ICA control, monitor neck for s/s hematoma  - vEEG (12/5-12/6) negative for seizures     Cards:   - -140  - HLD: cont home lipitor 20mg     Pulm:   - RA  - IS  - hx asthma: cont home albuterol prn  - duonebs q6 prn  - CTA PE protocol negative for PE 12/5    GI:   - CCD  - bowel regimen    Renal:   - IVL  - Voiding  - hx hyponatremia   - non-anion gap metabolic acidosis: thiamine q8 x3 days, s/p 650mg bicarb (12/4)    Heme:   - h/h stable  - DVT ppx: SCDs, SQL     ID:   - afebrile    Endo:   - A1c 6.0%, ISS while on steroids    Dispo: SDU, full code, dispo pending, PT/OT recs TBD    D/w Dr. Espinosa     HPI:  Ms. Ritter is a 43yo female patient, who had in the past two episodes of transient right sided tingling and numbness (first episode one year ago and second episode 6 months ago). Imaging workup showed two incidental bilateral ICA aneurysms. The left ICA aneurysm is a 4mm intradural paraopthalmic aneurysm, and the second one is a right, probably extradural, 5.8mm paraopthalmic aneurysm. The initial plan was to treat the left intradural aneurysm with a flow diverter, however the patient suffered heavy menstruation bleeding requiring hospitalization and transfusion while beeing on Aspirin and Plavix (for Stroke therapy). The dual antiplatelet regimen was then stopped. The case was discussed in our interdisciplinary vascular board, and the recommendation was made for surgical clipping of the left intradural ICA aneurysm. (03 Dec 2024 08:30)    S/OVERNIGHT EVENTS: 12/6: POD3. DARRIN overnight. Pt was examined on neurosurgery floor. Pt reports headache and pain this morning from her incision site. She reports the medication has been improving the pain. She reports not having much of an appetite postop. She denies any other neurological complaints. Pt denies denies dizziness, vision changes, numbness weakness, chest pain, palpitations, shortness of breath nausea, or vomiting.     Hospital Course:   12/3: POD0 L crani L paraopthalmic ICA aneurysm clipping. Pt AOx3, CASTRO, participating with exam. 20 min later, pt newly lethargic, no longer participating w exam, requiring deep noxious stimuli for w/d. not opening eyes, stroke code called. CTH w small SDH anterior to terence and compression of upper cervical cord c/f SDH. CTA head/neck, CTP negative. HFNC. abg improved. keppra inc to 1g BID.   12/4: POD 1. c/f non-anion gap metabolic acidosis, s/p bicarb 650mg PO. CXR: no acute pathology. SG CAMRON dc'd.   12/5: POD 2. CTA PE protocol complete. Neuro stable.CTA PE protocol negative, w L>R atelectasis. Dc'd high flow, switch to 6L NC. Keppra decreased to 500 BID. Placed on vEEG. SQL tonight. Prelim vEEG negative.   12/6: POD3    Vital Signs Last 24 Hrs  T(C): 37.4 (06 Dec 2024 05:00), Max: 37.4 (06 Dec 2024 05:00)  T(F): 99.4 (06 Dec 2024 05:00), Max: 99.4 (06 Dec 2024 05:00)  HR: 64 (06 Dec 2024 03:51) (57 - 74)  BP: 114/57 (06 Dec 2024 03:51) (96/55 - 114/57)  BP(mean): 80 (06 Dec 2024 03:51) (70 - 83)  RR: 18 (06 Dec 2024 03:51) (14 - 35)  SpO2: 95% (06 Dec 2024 03:51) (94% - 100%)    Parameters below as of 06 Dec 2024 03:51  Patient On (Oxygen Delivery Method): room air    I&O's Summary    05 Dec 2024 07:01  -  06 Dec 2024 07:00  --------------------------------------------------------  IN: 2460 mL / OUT: 1400 mL / NET: 1060 mL      PHYSICAL EXAM:  General: NAD, pt is sitting up in bed  CV: RRR, S1, S2  Resp: breathing non-labored on RA, chest rise symmetric  GI: abd soft, NTND  Neuro: AOx3, PERRL 3mm briskly reactive, EOMI b/l, (+) visual fields intact, face symmetric, tongue midline, no aphasia, speech clear, no dysmetria, no pronator drift. Follows commands.  CASTRO x4 spontaneously. 5/5 strength in all extremities throughout. Sensation intact.   Extremities: distal pulses 2+ x4.  Wound/incision: left crani incision with dressing c/d/i; left neck incision dry and clean w/o discharge or erythema; right groin site dressing removed s/p DSA, site is clean and dry w/o discharge or erythema. No hematoma.         DIET:  [] NPO  [x] Mechanical  [] Tube feeds    LABS:                        10.3   11.89 )-----------( 312      ( 06 Dec 2024 05:56 )             32.8     12-06    137  |  105  |  15  ----------------------------<  121[H]  3.9   |  23  |  0.47[L]    Ca    8.6      06 Dec 2024 05:56  Phos  3.3     12-06  Mg     2.2     12-06    TPro  7.0  /  Alb  4.0  /  TBili  0.9  /  DBili  x   /  AST  24  /  ALT  18  /  AlkPhos  79  12-04      Urinalysis Basic - ( 06 Dec 2024 05:56 )    Color: x / Appearance: x / SG: x / pH: x  Gluc: 121 mg/dL / Ketone: x  / Bili: x / Urobili: x   Blood: x / Protein: x / Nitrite: x   Leuk Esterase: x / RBC: x / WBC x   Sq Epi: x / Non Sq Epi: x / Bacteria: x      CAPILLARY BLOOD GLUCOSE      POCT Blood Glucose.: 154 mg/dL (05 Dec 2024 21:55)  POCT Blood Glucose.: 128 mg/dL (05 Dec 2024 17:27)  POCT Blood Glucose.: 139 mg/dL (05 Dec 2024 11:33)    Allergies    No Known Allergies    Intolerances      MEDICATIONS:  Antibiotics:    Neuro:  acetaminophen     Tablet .. 650 milliGRAM(s) Oral every 6 hours PRN  levETIRAcetam 500 milliGRAM(s) Oral every 12 hours  ondansetron Injectable 4 milliGRAM(s) IV Push every 6 hours PRN  traMADol 25 milliGRAM(s) Oral every 6 hours PRN    Anticoagulation:  enoxaparin Injectable 40 milliGRAM(s) SubCutaneous <User Schedule>    OTHER:  albuterol    90 MICROgram(s) HFA Inhaler 2 Puff(s) Inhalation every 6 hours PRN  albuterol/ipratropium for Nebulization 3 milliLiter(s) Nebulizer every 6 hours PRN  atorvastatin 20 milliGRAM(s) Oral at bedtime  polyethylene glycol 3350 17 Gram(s) Oral daily  senna 2 Tablet(s) Oral at bedtime    IVF:  multiple electrolytes Injection Type 1 1000 milliLiter(s) IV Continuous <Continuous>  potassium chloride    Tablet ER 10 milliEquivalent(s) Oral once  thiamine IVPB 500 milliGRAM(s) IV Intermittent every 8 hours      ASSESSMENT:  43 yo female patient with PMH of HLD, anxiety, TIAs, bilateral ICA aneurysms found incidentally on TIA w/u, presenting for elective L ICA aneurysm clipping. Now s/p L crani for L paraopthalmic ICA aneurysm clipping with intra-operative angiogram showing complete obliteration (12/3).      PLAN:  Neuro:   - neuro/vitals q1h  - pain control: tylenol 1g q8 prn, tramadol 25mg q6 prn   - seizure ppx: keppra 500mg bid   - optic nerve edema: s/p dex x 48 hours   - s/p removal SG CAMRON x1 (12/4)  - CTH 12/3 w small SDH anterior to terence and compression of upper cervical cord c/f SDH  - CTA head/neck, CTP 12/3 negative   - s/p left neck incision intra-op cervical ICA control, monitor neck for s/s hematoma  - vEEG (12/5-12/6) negative for seizures     Cards:   - -140  - HLD: cont home lipitor 20mg     Pulm:   - RA  - IS  - hx asthma: cont home albuterol prn  - duonebs q6 prn  - CTA PE protocol negative for PE 12/5    GI:   - CCD  - bowel regimen    Renal:   - IVL  - Voiding  - hx hyponatremia   - non-anion gap metabolic acidosis: thiamine q8 x3 days, s/p 650mg bicarb (12/4)    Heme:   - h/h stable  - DVT ppx: SCDs, SQL     ID:   - afebrile    Endo:   - A1c 6.0%, ISS while on steroids    Dispo: SDU, full code, dispo pending, PT/OT recs TBD    D/w Dr. Espinosa

## 2024-12-06 NOTE — DISCHARGE NOTE PROVIDER - NSDCMRMEDTOKEN_GEN_ALL_CORE_FT
Albuterol (Eqv-ProAir HFA) 90 mcg/inh inhalation aerosol: 2 puff(s) inhaled every 6 hours as needed for  shortness of breath and/or wheezing  atorvastatin 20 mg oral tablet: 1 tab(s) orally once a day (at bedtime)  D3-50 1250 mcg (50,000 intl units) oral capsule: 1 cap(s) orally once a week  iron---- 2x a day 325mg:    acetaminophen 325 mg oral tablet: 2 tab(s) orally every 6 hours As needed Temp greater or equal to 38C (100.4F), Mild Pain (1 - 3)  Albuterol (Eqv-ProAir HFA) 90 mcg/inh inhalation aerosol: 2 puff(s) inhaled every 6 hours as needed for  shortness of breath and/or wheezing  atorvastatin 20 mg oral tablet: 1 tab(s) orally once a day (at bedtime)  butalbital/acetaminophen/caffeine 50 mg-300 mg-40 mg oral capsule: 1 cap(s) orally every 6 hours as needed for refractory headache MDD: 4 capsules  D3-50 1250 mcg (50,000 intl units) oral capsule: 1 cap(s) orally once a week  iron---- 2x a day 325mg:   levETIRAcetam 500 mg oral tablet: 1 tab(s) orally every 12 hours  traMADol 25 mg oral tablet: 1 tab(s) orally every 6 hours as needed for  severe pain MDD: 4 tabs   acetaminophen 325 mg oral tablet: 2 tab(s) orally every 6 hours As needed Temp greater or equal to 38C (100.4F), Mild Pain (1 - 3)  Albuterol (Eqv-ProAir HFA) 90 mcg/inh inhalation aerosol: 2 puff(s) inhaled every 6 hours as needed for  shortness of breath and/or wheezing  atorvastatin 20 mg oral tablet: 1 tab(s) orally once a day (at bedtime)  butalbital/acetaminophen/caffeine 50 mg-300 mg-40 mg oral capsule: 1 cap(s) orally every 6 hours as needed for refractory headache MDD: 4 capsules  D3-50 1250 mcg (50,000 intl units) oral capsule: 1 cap(s) orally once a week  Fioricet 50 mg-300 mg-40 mg oral capsule: 1 cap(s) orally every 6 hours MDD: 4  iron---- 2x a day 325mg:   levETIRAcetam 500 mg oral tablet: 1 tab(s) orally every 12 hours  traMADol 25 mg oral tablet: 1 tab(s) orally every 6 hours as needed for  severe pain MDD: 4 tabs

## 2024-12-06 NOTE — DISCHARGE NOTE PROVIDER - NSDCCPCAREPLAN_GEN_ALL_CORE_FT
PRINCIPAL DISCHARGE DIAGNOSIS  Diagnosis: Intracranial aneurysm  Assessment and Plan of Treatment: Continue Keppra 500mg every 12 hours for seizure prevention.      SECONDARY DISCHARGE DIAGNOSES  Diagnosis: Asthma  Assessment and Plan of Treatment: Continue albuterol as prescribed.    Diagnosis: HLD (hyperlipidemia)  Assessment and Plan of Treatment: Continue lipitor as prescribed.     PRINCIPAL DISCHARGE DIAGNOSIS  Diagnosis: Intracranial aneurysm  Assessment and Plan of Treatment: Continue Keppra 500mg every 12 hours for seizure prevention until outpatient follow up.      SECONDARY DISCHARGE DIAGNOSES  Diagnosis: HLD (hyperlipidemia)  Assessment and Plan of Treatment: Continue lipitor as prescribed.    Diagnosis: Asthma  Assessment and Plan of Treatment: Continue albuterol as prescribed.

## 2024-12-07 LAB
ANION GAP SERPL CALC-SCNC: 10 MMOL/L — SIGNIFICANT CHANGE UP (ref 5–17)
BUN SERPL-MCNC: 22 MG/DL — SIGNIFICANT CHANGE UP (ref 7–23)
CALCIUM SERPL-MCNC: 8.8 MG/DL — SIGNIFICANT CHANGE UP (ref 8.4–10.5)
CHLORIDE SERPL-SCNC: 105 MMOL/L — SIGNIFICANT CHANGE UP (ref 96–108)
CO2 SERPL-SCNC: 24 MMOL/L — SIGNIFICANT CHANGE UP (ref 22–31)
CREAT SERPL-MCNC: 0.58 MG/DL — SIGNIFICANT CHANGE UP (ref 0.5–1.3)
EGFR: 114 ML/MIN/1.73M2 — SIGNIFICANT CHANGE UP
GLUCOSE SERPL-MCNC: 96 MG/DL — SIGNIFICANT CHANGE UP (ref 70–99)
HCT VFR BLD CALC: 34.3 % — LOW (ref 34.5–45)
HGB BLD-MCNC: 10.7 G/DL — LOW (ref 11.5–15.5)
MAGNESIUM SERPL-MCNC: 2.1 MG/DL — SIGNIFICANT CHANGE UP (ref 1.6–2.6)
MCHC RBC-ENTMCNC: 25.2 PG — LOW (ref 27–34)
MCHC RBC-ENTMCNC: 31.2 G/DL — LOW (ref 32–36)
MCV RBC AUTO: 80.9 FL — SIGNIFICANT CHANGE UP (ref 80–100)
NRBC # BLD: 0 /100 WBCS — SIGNIFICANT CHANGE UP (ref 0–0)
PHOSPHATE SERPL-MCNC: 4.1 MG/DL — SIGNIFICANT CHANGE UP (ref 2.5–4.5)
PLATELET # BLD AUTO: 281 K/UL — SIGNIFICANT CHANGE UP (ref 150–400)
POTASSIUM SERPL-MCNC: 4 MMOL/L — SIGNIFICANT CHANGE UP (ref 3.5–5.3)
POTASSIUM SERPL-SCNC: 4 MMOL/L — SIGNIFICANT CHANGE UP (ref 3.5–5.3)
RBC # BLD: 4.24 M/UL — SIGNIFICANT CHANGE UP (ref 3.8–5.2)
RBC # FLD: 21.9 % — HIGH (ref 10.3–14.5)
SODIUM SERPL-SCNC: 139 MMOL/L — SIGNIFICANT CHANGE UP (ref 135–145)
WBC # BLD: 8.43 K/UL — SIGNIFICANT CHANGE UP (ref 3.8–10.5)
WBC # FLD AUTO: 8.43 K/UL — SIGNIFICANT CHANGE UP (ref 3.8–10.5)

## 2024-12-07 PROCEDURE — 99232 SBSQ HOSP IP/OBS MODERATE 35: CPT

## 2024-12-07 RX ORDER — LACTULOSE 10 G/15ML
20 SOLUTION ORAL ONCE
Refills: 0 | Status: COMPLETED | OUTPATIENT
Start: 2024-12-07 | End: 2024-12-07

## 2024-12-07 RX ADMIN — ACETAMINOPHEN 500MG 650 MILLIGRAM(S): 500 TABLET, COATED ORAL at 06:00

## 2024-12-07 RX ADMIN — Medication 1 CAPSULE(S): at 16:04

## 2024-12-07 RX ADMIN — Medication 20 MILLIGRAM(S): at 21:13

## 2024-12-07 RX ADMIN — LEVETIRACETAM 500 MILLIGRAM(S): 1000 TABLET ORAL at 06:14

## 2024-12-07 RX ADMIN — TRAMADOL HYDROCHLORIDE 25 MILLIGRAM(S): 300 CAPSULE ORAL at 22:00

## 2024-12-07 RX ADMIN — ENOXAPARIN SODIUM 40 MILLIGRAM(S): 30 INJECTION SUBCUTANEOUS at 21:14

## 2024-12-07 RX ADMIN — TRAMADOL HYDROCHLORIDE 25 MILLIGRAM(S): 300 CAPSULE ORAL at 21:13

## 2024-12-07 RX ADMIN — Medication 1 CAPSULE(S): at 09:43

## 2024-12-07 RX ADMIN — POLYETHYLENE GLYCOL 3350 17 GRAM(S): 17 POWDER, FOR SOLUTION ORAL at 12:57

## 2024-12-07 RX ADMIN — Medication 2 TABLET(S): at 21:13

## 2024-12-07 RX ADMIN — ACETAMINOPHEN 500MG 650 MILLIGRAM(S): 500 TABLET, COATED ORAL at 05:03

## 2024-12-07 RX ADMIN — Medication 105 MILLIGRAM(S): at 06:14

## 2024-12-07 RX ADMIN — Medication 1 CAPSULE(S): at 10:43

## 2024-12-07 RX ADMIN — LEVETIRACETAM 500 MILLIGRAM(S): 1000 TABLET ORAL at 18:05

## 2024-12-07 NOTE — PROGRESS NOTE ADULT - SUBJECTIVE AND OBJECTIVE BOX
HPI:  Ms. Ritter is a 43yo female patient, who had in the past two episodes of transient right sided tingling and numbness (first episode one year ago and second episode 6 months ago). Imaging workup showed two incidental bilateral ICA aneurysms. The left ICA aneurysm is a 4mm intradural paraopthalmic aneurysm, and the second one is a right, probably extradural, 5.8mm paraopthalmic aneurysm. The initial plan was to treat the left intradural aneurysm with a flow diverter, however the patient suffered heavy menstruation bleeding requiring hospitalization and transfusion while beeing on Aspirin and Plavix (for Stroke therapy). The dual antiplatelet regimen was then stopped. The case was discussed in our interdisciplinary vascular board, and the recommendation was made for surgical clipping of the left intradural ICA aneurysm. (03 Dec 2024 08:30)    S/Overnight events: POD4, DARRIN overnight, neuro stable.      Hospital Course:   12/3: POD0 L crani L paraopthalmic ICA aneurysm clipping. Pt AOx3, CASTRO, participating with exam. 20 min later, pt newly lethargic, no longer participating w exam, requiring deep noxious stimuli for w/d. not opening eyes, stroke code called. CTH w small SDH anterior to terence and compression of upper cervical cord c/f SDH. CTA head/neck, CTP negative. HFNC. abg improved. keppra inc to 1g BID.   12/4: POD 1. c/f non-anion gap metabolic acidosis, s/p bicarb 650mg PO. CXR: no acute pathology. SG CAMRON dc'd.   12/5: POD 2. CTA PE protocol complete. Neuro stable.CTA PE protocol negative, w L>R atelectasis. Dc'd high flow, switch to 6L NC. Keppra decreased to 500 BID. Placed on vEEG. SQL tonight. Prelim vEEG negative.   12/6: POD3. EEG negative for seizures, dc'd.   12/7: POD4, DARRIN overnight, neuro stable.       Vital Signs Last 24 Hrs  T(C): 37.1 (06 Dec 2024 22:40), Max: 37.4 (06 Dec 2024 05:00)  T(F): 98.7 (06 Dec 2024 22:40), Max: 99.4 (06 Dec 2024 05:00)  HR: 68 (07 Dec 2024 00:15) (58 - 70)  BP: 103/61 (07 Dec 2024 00:15) (96/52 - 114/57)  BP(mean): 77 (07 Dec 2024 00:15) (69 - 85)  RR: 18 (07 Dec 2024 00:15) (16 - 18)  SpO2: 97% (07 Dec 2024 00:15) (95% - 97%)    Parameters below as of 07 Dec 2024 00:15  Patient On (Oxygen Delivery Method): room air        I&O's Detail    05 Dec 2024 07:01  -  06 Dec 2024 07:00  --------------------------------------------------------  IN:    IV PiggyBack: 100 mL    IV PiggyBack: 100 mL    multiple electrolytes Injection Type 1.: 1150 mL    Oral Fluid: 1110 mL  Total IN: 2460 mL    OUT:    Voided (mL): 1400 mL  Total OUT: 1400 mL    Total NET: 1060 mL      06 Dec 2024 07:01  -  07 Dec 2024 02:32  --------------------------------------------------------  IN:    multiple electrolytes Injection Type 1.: 100 mL  Total IN: 100 mL    OUT:    Voided (mL): 700 mL  Total OUT: 700 mL    Total NET: -600 mL        I&O's Summary    05 Dec 2024 07:01  -  06 Dec 2024 07:00  --------------------------------------------------------  IN: 2460 mL / OUT: 1400 mL / NET: 1060 mL    06 Dec 2024 07:01  -  07 Dec 2024 02:32  --------------------------------------------------------  IN: 100 mL / OUT: 700 mL / NET: -600 mL        PHYSICAL EXAM:  General: NAD, pt is sitting up in bed  CV: RRR, S1, S2  Resp: breathing non-labored on RA, chest rise symmetric  GI: abd soft, NTND  Neuro: AOx3, PERRL 3mm briskly reactive, EOMI b/l, (+) visual fields intact, face symmetric, tongue midline, no aphasia, speech clear, no dysmetria, no pronator drift. Follows commands.  CASTRO x4 spontaneously. 5/5 strength in all extremities throughout. Sensation intact.   Extremities: distal pulses 2+ x4.  Wound/incision: left crani incision with dressing c/d/i; left neck incision dry and clean w/o discharge or erythema; right groin site dressing removed s/p DSA, site is clean and dry w/o discharge or erythema. No hematoma.     TUBES/LINES:  [] CVC  [] A-line  [] Lumbar Drain  [] Ventriculostomy  [] Other    DIET:  [] NPO  [x] Mechanical  [] Tube feeds    LABS:    Urinalysis Basic - ( 06 Dec 2024 05:56 )    Color: x / Appearance: x / SG: x / pH: x  Gluc: 121 mg/dL / Ketone: x  / Bili: x / Urobili: x   Blood: x / Protein: x / Nitrite: x   Leuk Esterase: x / RBC: x / WBC x   Sq Epi: x / Non Sq Epi: x / Bacteria: x          CAPILLARY BLOOD GLUCOSE          Drug Levels: [] N/A    CSF Analysis: [] N/A      Allergies    No Known Allergies    Intolerances      MEDICATIONS:  Antibiotics:    Neuro:  acetaminophen     Tablet .. 650 milliGRAM(s) Oral every 6 hours PRN  acetaminophen 300 mG/butalbital 50 mG/ caffeine 40 mG 1 Capsule(s) Oral every 6 hours PRN  levETIRAcetam 500 milliGRAM(s) Oral every 12 hours  ondansetron Injectable 4 milliGRAM(s) IV Push every 6 hours PRN  traMADol 25 milliGRAM(s) Oral every 6 hours PRN    Anticoagulation:  enoxaparin Injectable 40 milliGRAM(s) SubCutaneous <User Schedule>    OTHER:  albuterol    90 MICROgram(s) HFA Inhaler 2 Puff(s) Inhalation every 6 hours PRN  albuterol/ipratropium for Nebulization 3 milliLiter(s) Nebulizer every 6 hours PRN  atorvastatin 20 milliGRAM(s) Oral at bedtime  polyethylene glycol 3350 17 Gram(s) Oral daily  senna 2 Tablet(s) Oral at bedtime    IVF:  thiamine IVPB 500 milliGRAM(s) IV Intermittent every 8 hours    CULTURES:    RADIOLOGY & ADDITIONAL TESTS:      ASSESSMENT:  45 yo female patient with PMH of HLD, anxiety, TIAs, bilateral ICA aneurysms found incidentally on TIA w/u, presenting for elective L ICA aneurysm clipping. Now s/p L crani for L paraopthalmic ICA aneurysm clipping with intra-operative angiogram showing complete obliteration (12/3).    Plan:  Neuro:   - neuro/vitals q1h  - pain control: tylenol 1g q8 prn, tramadol 25mg q6 prn   - seizure ppx: keppra 500mg bid   - optic nerve edema: s/p dex x 48 hours   - s/p removal SG CAMRON x1 (12/4)  - CTH 12/3 w small SDH anterior to terence and compression of upper cervical cord c/f SDH  - CTA head/neck, CTP 12/3 negative   - s/p left neck incision intra-op cervical ICA control, monitor neck for s/s hematoma  - vEEG (12/5-12/6) negative for seizures    Cards:   - -140  - HLD: cont home lipitor 20mg     Pulm:   - RA  - IS  - hx asthma: cont home albuterol prn  - duonebs q6 prn  - CTA PE protocol negative for PE 12/5    GI:   - CCD  - bowel regimen    Renal:   - IVL   - Voiding  - hx hyponatremia   - non-anion gap metabolic acidosis: thiamine q8 x3 days, s/p 650mg bicarb (12/4)    Heme:   - h/h stable  - DVT ppx: SCDs, SQL     ID:   - afebrile    Endo:   - A1c 6.0%, ISS while on steroids    Dispo: SDU, full code, dispo pending, PT/OT recs TBD    D/w Dr. Espinosa

## 2024-12-07 NOTE — PROGRESS NOTE ADULT - ASSESSMENT
45 yo female patient with PMH of HLD, anxiety, TIAs, bilateral ICA aneurysms found incidentally on TIA w/u, presenting for elective L ICA aneurysm clipping. Now s/p L crani for L paraopthalmic ICA aneurysm clipping with intra-operative angiogram showing complete obliteration (12/3).    #sp aneurysm clipping  - pain control per primary  - seizure ppx: keppra 500mg bid   - optic nerve edema: s/p dex x 48 hours   - s/p removal SG CAMRON x1 (12/4)  - CTH 12/3 w small SDH anterior to terence and compression of upper cervical cord c/f SDH  - CTA head/neck, CTP 12/3 negative   - s/p left neck incision intra-op cervical ICA control, monitor neck for s/s hematoma  - vEEG (12/5-12/6) negative for seizures    #HLD  - cont home lipitor 20mg     #hx asthma  - cont home albuterol prn  - duonebs q6 prn  - CTA PE protocol negative for PE 12/5    #non-anion gap metabolic acidosis on admission, resolved  - thiamine q8 x3 days  - s/p 650mg bicarb (12/4)    #DVT ppx: SCDs, SQL     Dispo: SDU, full code, dispo pending, PT/OT recs TBD

## 2024-12-07 NOTE — PROGRESS NOTE ADULT - SUBJECTIVE AND OBJECTIVE BOX
Patient is a 44y old  Female who presents with a chief complaint of 45 yo female patient presenting for an elective left pterional craniotomy and clipping of an incidental left paraopthalmic aneurysm of 4mm. (07 Dec 2024 02:30)        SUBJECTIVE:  Patient was seen and examined at bedside, no acute complaints but havne't had BM in a few days.    Overnight Events :     Last Bowel Movement: 02-Dec-2024 (12-05)      Review of systems: 12 point Review of systems negative unless otherwise documented elsewhere in note.     Diet, Consistent Carbohydrate/No Snacks (12-04-24 @ 16:18) [Active]      MEDICATIONS:  MEDICATIONS  (STANDING):  atorvastatin 20 milliGRAM(s) Oral at bedtime  enoxaparin Injectable 40 milliGRAM(s) SubCutaneous <User Schedule>  levETIRAcetam 500 milliGRAM(s) Oral every 12 hours  polyethylene glycol 3350 17 Gram(s) Oral daily  senna 2 Tablet(s) Oral at bedtime    MEDICATIONS  (PRN):  acetaminophen     Tablet .. 650 milliGRAM(s) Oral every 6 hours PRN Temp greater or equal to 38C (100.4F), Mild Pain (1 - 3)  acetaminophen 300 mG/butalbital 50 mG/ caffeine 40 mG 1 Capsule(s) Oral every 6 hours PRN refractory headache  albuterol    90 MICROgram(s) HFA Inhaler 2 Puff(s) Inhalation every 6 hours PRN for shortness of breath and/or wheezing  albuterol/ipratropium for Nebulization 3 milliLiter(s) Nebulizer every 6 hours PRN Shortness of Breath and/or Wheezing  ondansetron Injectable 4 milliGRAM(s) IV Push every 6 hours PRN Nausea and/or Vomiting  traMADol 25 milliGRAM(s) Oral every 6 hours PRN Severe Pain (7 - 10)      Allergies    No Known Allergies    Intolerances        OBJECTIVE:  Vital Signs Last 24 Hrs  T(C): 36.9 (07 Dec 2024 05:04), Max: 37.3 (06 Dec 2024 17:18)  T(F): 98.5 (07 Dec 2024 05:04), Max: 99.1 (06 Dec 2024 17:18)  HR: 60 (07 Dec 2024 08:51) (54 - 70)  BP: 95/51 (07 Dec 2024 08:51) (95/51 - 110/64)  BP(mean): 65 (07 Dec 2024 08:51) (65 - 85)  RR: 18 (07 Dec 2024 08:51) (16 - 18)  SpO2: 95% (07 Dec 2024 08:51) (95% - 97%)    Parameters below as of 07 Dec 2024 08:51  Patient On (Oxygen Delivery Method): room air      I&O's Summary    06 Dec 2024 07:01  -  07 Dec 2024 07:00  --------------------------------------------------------  IN: 100 mL / OUT: 700 mL / NET: -600 mL        PHYSICAL EXAM:  GENERAL: NAD, lying in bed comfortably, staples on head wound, c/d/i no drainage  CHEST/LUNG: Clear to auscultation bilaterally; No rales, rhonchi, wheezing, or rubs. Unlabored respirations  HEART: Regular rate and rhythm; No murmurs, rubs, or gallops  ABDOMEN: Bowel sounds present; Soft, Nontender, Nondistended. No hepatomegaly  EXTREMITIES:  WWP,  No clubbing, cyanosis, or edema  NERVOUS SYSTEM:  Alert & Oriented X3, speech clear. Non focal exam  SKIN: No rashes or lesions    LABS:                        10.7   8.43  )-----------( 281      ( 07 Dec 2024 06:34 )             34.3     12-07    139  |  105  |  22  ----------------------------<  96  4.0   |  24  |  0.58    Ca    8.8      07 Dec 2024 06:34  Phos  4.1     12-07  Mg     2.1     12-07          CAPILLARY BLOOD GLUCOSE        Urinalysis Basic - ( 07 Dec 2024 06:34 )    Color: x / Appearance: x / SG: x / pH: x  Gluc: 96 mg/dL / Ketone: x  / Bili: x / Urobili: x   Blood: x / Protein: x / Nitrite: x   Leuk Esterase: x / RBC: x / WBC x   Sq Epi: x / Non Sq Epi: x / Bacteria: x        MICRODATA:      RADIOLOGY/OTHER STUDIES:

## 2024-12-08 VITALS
DIASTOLIC BLOOD PRESSURE: 64 MMHG | SYSTOLIC BLOOD PRESSURE: 107 MMHG | HEART RATE: 64 BPM | OXYGEN SATURATION: 96 % | RESPIRATION RATE: 16 BRPM

## 2024-12-08 PROCEDURE — 97110 THERAPEUTIC EXERCISES: CPT

## 2024-12-08 PROCEDURE — C1713: CPT

## 2024-12-08 PROCEDURE — 94640 AIRWAY INHALATION TREATMENT: CPT

## 2024-12-08 PROCEDURE — C1889: CPT

## 2024-12-08 PROCEDURE — 82962 GLUCOSE BLOOD TEST: CPT

## 2024-12-08 PROCEDURE — C9399: CPT

## 2024-12-08 PROCEDURE — 84484 ASSAY OF TROPONIN QUANT: CPT

## 2024-12-08 PROCEDURE — 86900 BLOOD TYPING SEROLOGIC ABO: CPT

## 2024-12-08 PROCEDURE — 83735 ASSAY OF MAGNESIUM: CPT

## 2024-12-08 PROCEDURE — 84540 ASSAY OF URINE/UREA-N: CPT

## 2024-12-08 PROCEDURE — 80053 COMPREHEN METABOLIC PANEL: CPT

## 2024-12-08 PROCEDURE — 36415 COLL VENOUS BLD VENIPUNCTURE: CPT

## 2024-12-08 PROCEDURE — 95700 EEG CONT REC W/VID EEG TECH: CPT

## 2024-12-08 PROCEDURE — 82140 ASSAY OF AMMONIA: CPT

## 2024-12-08 PROCEDURE — 70498 CT ANGIOGRAPHY NECK: CPT | Mod: MC

## 2024-12-08 PROCEDURE — 84100 ASSAY OF PHOSPHORUS: CPT

## 2024-12-08 PROCEDURE — 84295 ASSAY OF SERUM SODIUM: CPT

## 2024-12-08 PROCEDURE — 83036 HEMOGLOBIN GLYCOSYLATED A1C: CPT

## 2024-12-08 PROCEDURE — 85027 COMPLETE CBC AUTOMATED: CPT

## 2024-12-08 PROCEDURE — 97116 GAIT TRAINING THERAPY: CPT

## 2024-12-08 PROCEDURE — 84132 ASSAY OF SERUM POTASSIUM: CPT

## 2024-12-08 PROCEDURE — 86850 RBC ANTIBODY SCREEN: CPT

## 2024-12-08 PROCEDURE — 85610 PROTHROMBIN TIME: CPT

## 2024-12-08 PROCEDURE — 82330 ASSAY OF CALCIUM: CPT

## 2024-12-08 PROCEDURE — 97535 SELF CARE MNGMENT TRAINING: CPT

## 2024-12-08 PROCEDURE — 82550 ASSAY OF CK (CPK): CPT

## 2024-12-08 PROCEDURE — 0042T: CPT

## 2024-12-08 PROCEDURE — 99232 SBSQ HOSP IP/OBS MODERATE 35: CPT

## 2024-12-08 PROCEDURE — 97161 PT EVAL LOW COMPLEX 20 MIN: CPT

## 2024-12-08 PROCEDURE — 82803 BLOOD GASES ANY COMBINATION: CPT

## 2024-12-08 PROCEDURE — 82570 ASSAY OF URINE CREATININE: CPT

## 2024-12-08 PROCEDURE — 95708 EEG WO VID EA 12-26HR UNMNTR: CPT

## 2024-12-08 PROCEDURE — 83605 ASSAY OF LACTIC ACID: CPT

## 2024-12-08 PROCEDURE — 70496 CT ANGIOGRAPHY HEAD: CPT | Mod: MC

## 2024-12-08 PROCEDURE — 71045 X-RAY EXAM CHEST 1 VIEW: CPT

## 2024-12-08 PROCEDURE — 71275 CT ANGIOGRAPHY CHEST: CPT | Mod: MC

## 2024-12-08 PROCEDURE — 84300 ASSAY OF URINE SODIUM: CPT

## 2024-12-08 PROCEDURE — 85730 THROMBOPLASTIN TIME PARTIAL: CPT

## 2024-12-08 PROCEDURE — 70450 CT HEAD/BRAIN W/O DYE: CPT | Mod: MC

## 2024-12-08 PROCEDURE — 86901 BLOOD TYPING SEROLOGIC RH(D): CPT

## 2024-12-08 PROCEDURE — 80048 BASIC METABOLIC PNL TOTAL CA: CPT

## 2024-12-08 PROCEDURE — 97165 OT EVAL LOW COMPLEX 30 MIN: CPT

## 2024-12-08 RX ORDER — BUTALB/ACETAMINOPHEN/CAFFEINE 50-325-40
1 TABLET ORAL
Qty: 56 | Refills: 0
Start: 2024-12-08 | End: 2024-12-21

## 2024-12-08 RX ORDER — LEVETIRACETAM 1000 MG/1
1 TABLET ORAL
Qty: 60 | Refills: 0
Start: 2024-12-08 | End: 2025-01-06

## 2024-12-08 RX ORDER — TRAMADOL HYDROCHLORIDE 300 MG/1
1 CAPSULE ORAL
Qty: 28 | Refills: 0
Start: 2024-12-08 | End: 2024-12-14

## 2024-12-08 RX ORDER — ACETAMINOPHEN 500MG 500 MG/1
2 TABLET, COATED ORAL
Qty: 0 | Refills: 0 | DISCHARGE
Start: 2024-12-08

## 2024-12-08 RX ORDER — BUTALB/ACETAMINOPHEN/CAFFEINE 50-325-40
1 TABLET ORAL
Qty: 12 | Refills: 0
Start: 2024-12-08 | End: 2024-12-10

## 2024-12-08 RX ADMIN — Medication 1 CAPSULE(S): at 16:07

## 2024-12-08 RX ADMIN — ACETAMINOPHEN 500MG 650 MILLIGRAM(S): 500 TABLET, COATED ORAL at 06:21

## 2024-12-08 RX ADMIN — TRAMADOL HYDROCHLORIDE 25 MILLIGRAM(S): 300 CAPSULE ORAL at 02:40

## 2024-12-08 RX ADMIN — Medication 1 CAPSULE(S): at 09:30

## 2024-12-08 RX ADMIN — ACETAMINOPHEN 500MG 650 MILLIGRAM(S): 500 TABLET, COATED ORAL at 07:00

## 2024-12-08 RX ADMIN — LEVETIRACETAM 500 MILLIGRAM(S): 1000 TABLET ORAL at 17:03

## 2024-12-08 RX ADMIN — Medication 1 CAPSULE(S): at 08:41

## 2024-12-08 RX ADMIN — TRAMADOL HYDROCHLORIDE 25 MILLIGRAM(S): 300 CAPSULE ORAL at 03:20

## 2024-12-08 RX ADMIN — LEVETIRACETAM 500 MILLIGRAM(S): 1000 TABLET ORAL at 06:21

## 2024-12-08 NOTE — PROGRESS NOTE ADULT - PROVIDER SPECIALTY LIST ADULT
Hospitalist
Neurosurgery
Neurosurgery
Hospitalist
NSICU
Neurosurgery
NSICU

## 2024-12-08 NOTE — DISCHARGE NOTE NURSING/CASE MANAGEMENT/SOCIAL WORK - NSDCFUADDAPPT_GEN_ALL_CORE_FT
Please follow up with Dr. Espinosa outpatient. Call [phone] to confirm appointment.     Please also follow up with primary call provider.

## 2024-12-08 NOTE — PROGRESS NOTE ADULT - SUBJECTIVE AND OBJECTIVE BOX
Patient is a 44y old  Female who presents with a chief complaint of 43 yo female patient presenting for an elective left pterional craniotomy and clipping of an incidental left paraopthalmic aneurysm of 4mm. (08 Dec 2024 00:03)        SUBJECTIVE:  Patient was seen and examined at bedside, no acute complaints    Overnight Events : NAEON    Last Bowel Movement: 07-Dec-2024 (12-08)  Last Bowel Movement: 07-Dec-2024 (12-07)  Last Bowel Movement: 02-Dec-2024 (12-05)      Review of systems: 12 point Review of systems negative unless otherwise documented elsewhere in note.     Diet, Consistent Carbohydrate/No Snacks (12-04-24 @ 16:18) [Active]      MEDICATIONS:  MEDICATIONS  (STANDING):  atorvastatin 20 milliGRAM(s) Oral at bedtime  enoxaparin Injectable 40 milliGRAM(s) SubCutaneous <User Schedule>  levETIRAcetam 500 milliGRAM(s) Oral every 12 hours  polyethylene glycol 3350 17 Gram(s) Oral daily  senna 2 Tablet(s) Oral at bedtime    MEDICATIONS  (PRN):  acetaminophen     Tablet .. 650 milliGRAM(s) Oral every 6 hours PRN Temp greater or equal to 38C (100.4F), Mild Pain (1 - 3)  acetaminophen 300 mG/butalbital 50 mG/ caffeine 40 mG 1 Capsule(s) Oral every 6 hours PRN refractory headache  albuterol    90 MICROgram(s) HFA Inhaler 2 Puff(s) Inhalation every 6 hours PRN for shortness of breath and/or wheezing  albuterol/ipratropium for Nebulization 3 milliLiter(s) Nebulizer every 6 hours PRN Shortness of Breath and/or Wheezing  ondansetron Injectable 4 milliGRAM(s) IV Push every 6 hours PRN Nausea and/or Vomiting  traMADol 25 milliGRAM(s) Oral every 6 hours PRN Severe Pain (7 - 10)      Allergies    No Known Allergies    Intolerances        OBJECTIVE:  Vital Signs Last 24 Hrs  T(C): 37 (08 Dec 2024 09:00), Max: 37.3 (08 Dec 2024 04:48)  T(F): 98.6 (08 Dec 2024 09:00), Max: 99.1 (08 Dec 2024 04:48)  HR: 64 (08 Dec 2024 08:30) (56 - 64)  BP: 101/58 (08 Dec 2024 08:30) (101/58 - 117/78)  BP(mean): 74 (08 Dec 2024 08:30) (74 - 94)  RR: 16 (08 Dec 2024 08:30) (16 - 18)  SpO2: 96% (08 Dec 2024 08:30) (94% - 96%)    Parameters below as of 08 Dec 2024 08:30  Patient On (Oxygen Delivery Method): room air      I&O's Summary    07 Dec 2024 07:01  -  08 Dec 2024 07:00  --------------------------------------------------------  IN: 0 mL / OUT: 760 mL / NET: -760 mL        PHYSICAL EXAM:  Gen: Resting in bed at time of exam, not in distress   CV: RRR, +S1/S2  Pulm: no wheezing , no crackles  no increase in work of breathing  Abd: soft, NTND  Skin: warm and dry, no new rashes   Ext: moving all 4 extremities spontaneously , no edema   Neuro: AOx3, no gross focal neurological deficits  Psych: affect and behavior appropriate, pleasant at time of interview    LABS:                        10.7   8.43  )-----------( 281      ( 07 Dec 2024 06:34 )             34.3     12-07    139  |  105  |  22  ----------------------------<  96  4.0   |  24  |  0.58    Ca    8.8      07 Dec 2024 06:34  Phos  4.1     12-07  Mg     2.1     12-07          CAPILLARY BLOOD GLUCOSE        Urinalysis Basic - ( 07 Dec 2024 06:34 )    Color: x / Appearance: x / SG: x / pH: x  Gluc: 96 mg/dL / Ketone: x  / Bili: x / Urobili: x   Blood: x / Protein: x / Nitrite: x   Leuk Esterase: x / RBC: x / WBC x   Sq Epi: x / Non Sq Epi: x / Bacteria: x        MICRODATA:      RADIOLOGY/OTHER STUDIES:

## 2024-12-08 NOTE — PROGRESS NOTE ADULT - ASSESSMENT
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 Now s/p L crani for L paraopthalmic ICA aneurysm clipping with intra-operative angiogram showing complete obliteration (12/3).    #sp aneurysm clipping  - pain control per primary  - seizure ppx: keppra 500mg bid   - optic nerve edema: s/p dex x 48 hours   - s/p removal SG CAMRON x1 (12/4)  - CTH 12/3 w small SDH anterior to terence and compression of upper cervical cord c/f SDH  - CTA head/neck, CTP 12/3 negative   - s/p left neck incision intra-op cervical ICA control, monitor neck for s/s hematoma  - vEEG (12/5-12/6) negative for seizures    #HLD  - cont home lipitor 20mg     #hx asthma  - cont home albuterol prn  - duonebs q6 prn  - CTA PE protocol negative for PE 12/5    #non-anion gap metabolic acidosis on admission, resolved  - thiamine q8 x3 days  - s/p 650mg bicarb (12/4)    #DVT ppx: SCDs, SQL     Dispo: home today

## 2024-12-08 NOTE — DISCHARGE NOTE NURSING/CASE MANAGEMENT/SOCIAL WORK - NSDCPEFALRISK_GEN_ALL_CORE
For information on Fall & Injury Prevention, visit: [email or web address] OR  [email or web address] OR  https://www.cdc.gov/steadi/patient.html

## 2024-12-08 NOTE — PROGRESS NOTE ADULT - TIME BILLING
Bedside exam and interview   Reviewed vitals, labs   Discussed patient's plan of care with primary  Documentation of encounter  Excludes teaching and separately reported services
Bedside exam and interview   Reviewed vitals, labs   Discussed patient's plan of care with primary  Documentation of encounter  Excludes teaching and separately reported services

## 2024-12-08 NOTE — DISCHARGE NOTE NURSING/CASE MANAGEMENT/SOCIAL WORK - FINANCIAL ASSISTANCE
[facility] provides services at a reduced cost to those who are determined to be eligible through [facility]’s financial assistance program. Information regarding [facility]’s financial assistance program can be found by going to [email or web address] or by calling [phone].

## 2024-12-08 NOTE — DISCHARGE NOTE NURSING/CASE MANAGEMENT/SOCIAL WORK - PATIENT PORTAL LINK FT
You can access the FollowMyHealth Patient Portal offered by [facility] by registering at the following website: [email or web address]. By joining [employer]’s FollowMyHealth portal, you will also be able to view your health information using other applications (apps) compatible with our system.

## 2024-12-08 NOTE — PROGRESS NOTE ADULT - REASON FOR ADMISSION
43 yo female patient presenting for an elective left pterional craniotomy and clipping of an incidental left paraopthalmic aneurysm of 4mm.
43 yo female patient presenting for an elective left pterional craniotomy and clipping of an incidental left paraopthalmic aneurysm of 4mm.
45 yo female patient presenting for an elective left pterional craniotomy and clipping of an incidental left paraopthalmic aneurysm of 4mm.
43 yo female patient presenting for an elective left pterional craniotomy and clipping of an incidental left paraopthalmic aneurysm of 4mm.
45 yo female patient presenting for an elective left pterional craniotomy and clipping of an incidental left paraopthalmic aneurysm of 4mm.
45 yo female patient presenting for an elective left pterional craniotomy and clipping of an incidental left paraopthalmic aneurysm of 4mm.
43 yo female patient presenting for an elective left pterional craniotomy and clipping of an incidental left paraopthalmic aneurysm of 4mm.
45 yo female patient presenting for an elective left pterional craniotomy and clipping of an incidental left paraopthalmic aneurysm of 4mm.

## 2024-12-08 NOTE — PROGRESS NOTE ADULT - SUBJECTIVE AND OBJECTIVE BOX
HPI:  Ms. Ritter is a 45yo female patient, who had in the past two episodes of transient right sided tingling and numbness (first episode one year ago and second episode 6 months ago). Imaging workup showed two incidental bilateral ICA aneurysms. The left ICA aneurysm is a 4mm intradural paraopthalmic aneurysm, and the second one is a right, probably extradural, 5.8mm paraopthalmic aneurysm. The initial plan was to treat the left intradural aneurysm with a flow diverter, however the patient suffered heavy menstruation bleeding requiring hospitalization and transfusion while beeing on Aspirin and Plavix (for Stroke therapy). The dual antiplatelet regimen was then stopped. The case was discussed in our interdisciplinary vascular board, and the recommendation was made for surgical clipping of the left intradural ICA aneurysm. (03 Dec 2024 08:30)    S/Overnight events: POD5, DARRIN overnight, neuro stable.       Hospital Course:   12/3: POD0 L crani L paraopthalmic ICA aneurysm clipping. Pt AOx3, CASTRO, participating with exam. 20 min later, pt newly lethargic, no longer participating w exam, requiring deep noxious stimuli for w/d. not opening eyes, stroke code called. CTH w small SDH anterior to terence and compression of upper cervical cord c/f SDH. CTA head/neck, CTP negative. HFNC. abg improved. keppra inc to 1g BID.   12/4: POD 1. c/f non-anion gap metabolic acidosis, s/p bicarb 650mg PO. CXR: no acute pathology. SG CAMRON dc'd.   12/5: POD 2. CTA PE protocol complete. Neuro stable.CTA PE protocol negative, w L>R atelectasis. Dc'd high flow, switch to 6L NC. Keppra decreased to 500 BID. Placed on vEEG. SQL tonight. Prelim vEEG negative.   12/6: POD3. EEG negative for seizures, dc'd.   12/7: POD4, DARRIN overnight, neuro stable. Given lactulose, pending BM.   12/8: POD5, DARRIN overnight, neuro stable.       Vital Signs Last 24 Hrs  T(C): 37.1 (07 Dec 2024 21:05), Max: 37.1 (07 Dec 2024 17:50)  T(F): 98.8 (07 Dec 2024 21:05), Max: 98.8 (07 Dec 2024 17:50)  HR: 64 (07 Dec 2024 20:40) (54 - 68)  BP: 117/78 (07 Dec 2024 20:40) (95/51 - 117/78)  BP(mean): 94 (07 Dec 2024 20:40) (65 - 94)  RR: 16 (07 Dec 2024 20:40) (16 - 18)  SpO2: 96% (07 Dec 2024 20:40) (95% - 97%)    Parameters below as of 07 Dec 2024 20:40  Patient On (Oxygen Delivery Method): room air        I&O's Detail    06 Dec 2024 07:01  -  07 Dec 2024 07:00  --------------------------------------------------------  IN:    multiple electrolytes Injection Type 1.: 100 mL  Total IN: 100 mL    OUT:    Voided (mL): 700 mL  Total OUT: 700 mL    Total NET: -600 mL      07 Dec 2024 07:01  -  08 Dec 2024 00:03  --------------------------------------------------------  IN:  Total IN: 0 mL    OUT:    Voided (mL): 560 mL  Total OUT: 560 mL    Total NET: -560 mL        I&O's Summary    06 Dec 2024 07:01  -  07 Dec 2024 07:00  --------------------------------------------------------  IN: 100 mL / OUT: 700 mL / NET: -600 mL    07 Dec 2024 07:01  -  08 Dec 2024 00:03  --------------------------------------------------------  IN: 0 mL / OUT: 560 mL / NET: -560 mL        PHYSICAL EXAM:  General: NAD, pt is sitting up in bed  CV: RRR, S1, S2  Resp: breathing non-labored on RA, chest rise symmetric  GI: abd soft, NTND  Neuro: AOx3, PERRL 3mm briskly reactive, EOMI b/l, (+) visual fields intact, face symmetric, tongue midline, no aphasia, speech clear, no dysmetria, no pronator drift. Follows commands.  CASTRO x4 spontaneously. 5/5 strength in all extremities throughout. Sensation intact.   Extremities: distal pulses 2+ x4.  Wound/incision: left crani incision with dressing c/d/i; left neck incision dry and clean w/o discharge or erythema; right groin site dressing removed s/p DSA, site is clean and dry w/o discharge or erythema. No hematoma.     TUBES/LINES:  [] CVC  [] A-line  [] Lumbar Drain  [] Ventriculostomy  [] Other    DIET:  [] NPO  [x] Mechanical  [] Tube feeds    LABS:    Urinalysis Basic - ( 07 Dec 2024 06:34 )    Color: x / Appearance: x / SG: x / pH: x  Gluc: 96 mg/dL / Ketone: x  / Bili: x / Urobili: x   Blood: x / Protein: x / Nitrite: x   Leuk Esterase: x / RBC: x / WBC x   Sq Epi: x / Non Sq Epi: x / Bacteria: x          CAPILLARY BLOOD GLUCOSE          Drug Levels: [] N/A    CSF Analysis: [] N/A      Allergies    No Known Allergies    Intolerances      MEDICATIONS:  Antibiotics:    Neuro:  acetaminophen     Tablet .. 650 milliGRAM(s) Oral every 6 hours PRN  acetaminophen 300 mG/butalbital 50 mG/ caffeine 40 mG 1 Capsule(s) Oral every 6 hours PRN  levETIRAcetam 500 milliGRAM(s) Oral every 12 hours  ondansetron Injectable 4 milliGRAM(s) IV Push every 6 hours PRN  traMADol 25 milliGRAM(s) Oral every 6 hours PRN    Anticoagulation:  enoxaparin Injectable 40 milliGRAM(s) SubCutaneous <User Schedule>    OTHER:  albuterol    90 MICROgram(s) HFA Inhaler 2 Puff(s) Inhalation every 6 hours PRN  albuterol/ipratropium for Nebulization 3 milliLiter(s) Nebulizer every 6 hours PRN  atorvastatin 20 milliGRAM(s) Oral at bedtime  polyethylene glycol 3350 17 Gram(s) Oral daily  senna 2 Tablet(s) Oral at bedtime    IVF:    CULTURES:    RADIOLOGY & ADDITIONAL TESTS:      ASSESSMENT:  45 yo female patient with PMH of HLD, anxiety, TIAs, bilateral ICA aneurysms found incidentally on TIA w/u, presenting for elective L ICA aneurysm clipping. Now s/p L crani for L paraopthalmic ICA aneurysm clipping with intra-operative angiogram showing complete obliteration (12/3).    Neuro:   - neuro/vitals q1h  - pain control: tylenol 1g q8 prn, tramadol 25mg q6 prn, fioricet prn   - seizure ppx: keppra 500mg bid until o/p follow up   - optic nerve edema: s/p dex x 48 hours   - s/p removal SG CAMRON x1 (12/4)  - CTH 12/3 w small SDH anterior to terence and compression of upper cervical cord c/f SDH  - CTA head/neck, CTP 12/3 negative   - s/p left neck incision intra-op cervical ICA control, monitor neck for s/s hematoma  - vEEG (12/5-12/6) negative for seizures    Cards:   - -140  - HLD: cont home lipitor 20mg     Pulm:   - RA  - IS  - hx asthma: cont home albuterol prn  - duonebs q6 prn  - CTA PE protocol negative for PE 12/5    GI:   - CCD  - bowel regimen    Renal:   - IVL   - Voiding  - hx hyponatremia   - non-anion gap metabolic acidosis: thiamine q8 x3 days, s/p 650mg bicarb (12/4)    Heme:   - h/h stable  - DVT ppx: SCDs, SQL     ID:   - afebrile    Endo:   - A1c 6.0%, ISS while on steroids    Dispo: SDU, full code, dispo pending, PT/OT recs TBD    D/w Dr. Espinosa

## 2024-12-10 ENCOUNTER — APPOINTMENT (OUTPATIENT)
Dept: NEUROSURGERY | Facility: CLINIC | Age: 44
End: 2024-12-10
Payer: COMMERCIAL

## 2024-12-10 DIAGNOSIS — J45.909 UNSPECIFIED ASTHMA, UNCOMPLICATED: ICD-10-CM

## 2024-12-10 DIAGNOSIS — Z98.890 OTHER SPECIFIED POSTPROCEDURAL STATES: ICD-10-CM

## 2024-12-10 DIAGNOSIS — I72.0 ANEURYSM OF CAROTID ARTERY: ICD-10-CM

## 2024-12-10 PROBLEM — I67.1 CEREBRAL ANEURYSM, NONRUPTURED: Chronic | Status: ACTIVE | Noted: 2024-12-02

## 2024-12-10 PROCEDURE — 99442: CPT

## 2024-12-13 RX ORDER — BUTALBITAL, ACETAMINOPHEN AND CAFFEINE 300; 50; 40 MG/1; MG/1; MG/1
50-300-40 CAPSULE ORAL
Qty: 56 | Refills: 0 | Status: ACTIVE | COMMUNITY
Start: 2024-12-13 | End: 1900-01-01

## 2024-12-14 NOTE — CHART NOTE - NSCHARTNOTESELECT_GEN_ALL_CORE
Event Note
Stroke Code/Event Note
[FreeTextEntry1] : 59 year old female with advanced pancreatic cancer with obstructive jaundice , portal vein encasement  and dilated CBD s/p metallic stent . splenomegaly and suspected varices. ECOG 2 to 3 .\par S/P FOlFIRINOX X 5 poorly tolerated due to severe diarrhea. hypokalemia. marked weight loss. rule out progressive disease. \par Plan : repeat blood work , CA19.9 \par          re-evaluate disease status with PET scan , rule out progressive disease. \par          continue to hold chemotherapy ( last cycle 3 weeks ago ) .encouraged to take pancreatic enzymes as prescribed , appetite seems to improve after she stopped lomotil and with treatment break . \par All questions addressed in detail . 
Event Note

## 2024-12-14 NOTE — CHART NOTE - NSCHARTNOTEFT_GEN_A_CORE
Patient evaluted post op: AOx3, PERRL, EOMI, facial symmetrical, no blurry vision however slow to wake up will reassess (able to read ID), fluent speech, motor 5/5 throughout, no PND, sensation in tact    Notified by nursing patient had acute change in mental status; at bedside examination, patient with the following exam:    Difficult to arouse, AOx1-2 (name, [facility]) with deep nox, BARON, pupils 3mm reactive, dysarthric, LUE 4-/5 LLE distally 4/5 to nox, RUE 0/5, RLE trace wd (limited d/t angio), Lt neck incision c/d/i    Stroke code called, informed NSG, loaded with Levetiracetam and ordered ABG.    Update:    CTH without heme, CTA without LVO, no mismatch on CTP; ABG with hypoxemia on 4L NC and without hypercapnia.
Patient was received in ICU at approximately 9:20PM. Was waking up from anesthesia but AOx3, moving all extremities, and participating with exam. Left SG CAMRON to full suction, left carotid access site soft. Left pteronial crani incision with telfa dressing c/d/i.     At approx 9:45PM, ACP was made aware by RN that patient was no longer participating with exam, newly lethargic, requiring deep noxious stimuli and not opening eyes spontaneously. No longer oriented. W/d b/l UE to deep noxious stimuli. PERRL.     Stroke code was called. Stroke ACP Gianna at the bedside, patient went down for CTH non con, CTA head/neck, CTP.     MD Espinosa and MD Bryant aware.
Please note this patient underwent a left craniotomy for clipping of left paraophthalmic artery aneurysm with CT Head post-operatively demonstrating a postprocedural subdural hematoma anterior to the terence which was managed with observation and without further sequale

## 2024-12-17 ENCOUNTER — APPOINTMENT (OUTPATIENT)
Dept: NEUROSURGERY | Facility: CLINIC | Age: 44
End: 2024-12-17
Payer: COMMERCIAL

## 2024-12-17 VITALS
OXYGEN SATURATION: 99 % | WEIGHT: 161 LBS | RESPIRATION RATE: 18 BRPM | DIASTOLIC BLOOD PRESSURE: 71 MMHG | HEIGHT: 61 IN | SYSTOLIC BLOOD PRESSURE: 109 MMHG | TEMPERATURE: 97.7 F | BODY MASS INDEX: 30.4 KG/M2 | HEART RATE: 62 BPM

## 2024-12-17 PROBLEM — Z98.890 POST-OPERATIVE STATE: Status: ACTIVE | Noted: 2024-12-17

## 2024-12-17 PROBLEM — J45.909 ASTHMA: Status: RESOLVED | Noted: 2024-07-23 | Resolved: 2024-12-17

## 2024-12-17 PROCEDURE — 99024 POSTOP FOLLOW-UP VISIT: CPT

## 2024-12-17 RX ORDER — LEVETIRACETAM 500 MG/1
500 TABLET, FILM COATED ORAL
Refills: 0 | Status: ACTIVE | COMMUNITY

## 2024-12-17 RX ORDER — LEVETIRACETAM 500 MG/1
500 TABLET, FILM COATED ORAL TWICE DAILY
Qty: 60 | Refills: 2 | Status: ACTIVE | COMMUNITY
Start: 2024-12-17 | End: 1900-01-01

## 2024-12-18 DIAGNOSIS — G97.61 POSTPROCEDURAL HEMATOMA OF A NERVOUS SYSTEM ORGAN OR STRUCTURE FOLLOWING A NERVOUS SYSTEM PROCEDURE: ICD-10-CM

## 2024-12-18 DIAGNOSIS — E78.5 HYPERLIPIDEMIA, UNSPECIFIED: ICD-10-CM

## 2024-12-18 DIAGNOSIS — R41.82 ALTERED MENTAL STATUS, UNSPECIFIED: ICD-10-CM
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
## 2024-12-18 DIAGNOSIS — R09.02 HYPOXEMIA: ICD-10-CM

## 2024-12-18 DIAGNOSIS — J98.11 ATELECTASIS: ICD-10-CM

## 2024-12-18 DIAGNOSIS — H47.092 OTHER DISORDERS OF OPTIC NERVE, NOT ELSEWHERE CLASSIFIED, LEFT EYE: ICD-10-CM

## 2024-12-18 DIAGNOSIS — I67.1 CEREBRAL ANEURYSM, NONRUPTURED: ICD-10-CM

## 2024-12-18 DIAGNOSIS — E87.20 ACIDOSIS, UNSPECIFIED: ICD-10-CM

## 2024-12-18 DIAGNOSIS — E87.1 HYPO-OSMOLALITY AND HYPONATREMIA: ICD-10-CM
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
## 2024-12-18 DIAGNOSIS — G95.20 UNSPECIFIED CORD COMPRESSION: ICD-10-CM

## 2024-12-18 DIAGNOSIS — H47.10 UNSPECIFIED PAPILLEDEMA: ICD-10-CM

## 2024-12-18 DIAGNOSIS — N92.0 EXCESSIVE AND FREQUENT MENSTRUATION WITH REGULAR CYCLE: ICD-10-CM

## 2024-12-18 DIAGNOSIS — J45.909 UNSPECIFIED ASTHMA, UNCOMPLICATED: ICD-10-CM

## 2025-01-02 ENCOUNTER — APPOINTMENT (OUTPATIENT)
Dept: NEUROSURGERY | Facility: CLINIC | Age: 45
End: 2025-01-02
Payer: COMMERCIAL

## 2025-01-02 VITALS
WEIGHT: 161 LBS | SYSTOLIC BLOOD PRESSURE: 120 MMHG | TEMPERATURE: 97.6 F | BODY MASS INDEX: 30.4 KG/M2 | HEIGHT: 61 IN | HEART RATE: 72 BPM | DIASTOLIC BLOOD PRESSURE: 80 MMHG | OXYGEN SATURATION: 98 % | RESPIRATION RATE: 18 BRPM

## 2025-01-02 DIAGNOSIS — I99.9 UNSPECIFIED DISORDER OF CIRCULATORY SYSTEM: ICD-10-CM

## 2025-01-02 DIAGNOSIS — Z98.890 OTHER SPECIFIED POSTPROCEDURAL STATES: ICD-10-CM

## 2025-01-02 DIAGNOSIS — R56.9 UNSPECIFIED CONVULSIONS: ICD-10-CM

## 2025-01-02 DIAGNOSIS — I72.0 ANEURYSM OF CAROTID ARTERY: ICD-10-CM

## 2025-01-02 PROCEDURE — 99024 POSTOP FOLLOW-UP VISIT: CPT

## 2025-01-23 ENCOUNTER — NON-APPOINTMENT (OUTPATIENT)
Age: 45
End: 2025-01-23

## 2025-01-23 ENCOUNTER — APPOINTMENT (OUTPATIENT)
Dept: NEUROLOGY | Facility: CLINIC | Age: 45
End: 2025-01-23
Payer: COMMERCIAL

## 2025-01-23 VITALS
OXYGEN SATURATION: 96 % | DIASTOLIC BLOOD PRESSURE: 78 MMHG | WEIGHT: 165.2 LBS | SYSTOLIC BLOOD PRESSURE: 115 MMHG | BODY MASS INDEX: 31.21 KG/M2 | TEMPERATURE: 98 F | HEART RATE: 92 BPM

## 2025-01-23 PROCEDURE — 95816 EEG AWAKE AND DROWSY: CPT

## 2025-01-23 PROCEDURE — 99214 OFFICE O/P EST MOD 30 MIN: CPT

## 2025-01-23 PROCEDURE — G2211 COMPLEX E/M VISIT ADD ON: CPT | Mod: NC

## 2025-02-08 ENCOUNTER — APPOINTMENT (OUTPATIENT)
Dept: INTERNAL MEDICINE | Facility: CLINIC | Age: 45
End: 2025-02-08
Payer: COMMERCIAL

## 2025-02-08 VITALS
DIASTOLIC BLOOD PRESSURE: 66 MMHG | HEART RATE: 96 BPM | RESPIRATION RATE: 16 BRPM | BODY MASS INDEX: 31.15 KG/M2 | HEIGHT: 61 IN | OXYGEN SATURATION: 98 % | TEMPERATURE: 96.8 F | WEIGHT: 165 LBS | SYSTOLIC BLOOD PRESSURE: 103 MMHG

## 2025-02-08 DIAGNOSIS — J45.909 UNSPECIFIED ASTHMA, UNCOMPLICATED: ICD-10-CM

## 2025-02-08 DIAGNOSIS — E78.5 HYPERLIPIDEMIA, UNSPECIFIED: ICD-10-CM

## 2025-02-08 DIAGNOSIS — D50.9 IRON DEFICIENCY ANEMIA, UNSPECIFIED: ICD-10-CM

## 2025-02-08 DIAGNOSIS — G45.9 TRANSIENT CEREBRAL ISCHEMIC ATTACK, UNSPECIFIED: ICD-10-CM

## 2025-02-08 DIAGNOSIS — R73.09 OTHER ABNORMAL GLUCOSE: ICD-10-CM

## 2025-02-08 PROCEDURE — 99213 OFFICE O/P EST LOW 20 MIN: CPT

## 2025-02-08 RX ORDER — ALBUTEROL SULFATE 90 UG/1
108 (90 BASE) INHALANT RESPIRATORY (INHALATION)
Qty: 1 | Refills: 5 | Status: ACTIVE | COMMUNITY
Start: 2025-02-08 | End: 1900-01-01

## 2025-02-08 RX ORDER — LORATADINE 10 MG/1
10 TABLET ORAL
Qty: 30 | Refills: 5 | Status: ACTIVE | COMMUNITY
Start: 2025-02-08 | End: 1900-01-01

## 2025-02-18 ENCOUNTER — APPOINTMENT (OUTPATIENT)
Dept: NEUROLOGY | Facility: CLINIC | Age: 45
End: 2025-02-18

## 2025-02-19 ENCOUNTER — APPOINTMENT (OUTPATIENT)
Dept: NEUROLOGY | Facility: CLINIC | Age: 45
End: 2025-02-19

## 2025-03-02 ENCOUNTER — EMERGENCY (EMERGENCY)
Facility: HOSPITAL | Age: 45
LOS: 1 days | Discharge: HOME | End: 2025-03-02
Attending: EMERGENCY MEDICINE
Payer: COMMERCIAL

## 2025-03-02 VITALS
HEART RATE: 78 BPM | RESPIRATION RATE: 18 BRPM | OXYGEN SATURATION: 97 % | SYSTOLIC BLOOD PRESSURE: 101 MMHG | TEMPERATURE: 98 F | DIASTOLIC BLOOD PRESSURE: 64 MMHG

## 2025-03-02 VITALS
WEIGHT: 169.76 LBS | TEMPERATURE: 98 F | RESPIRATION RATE: 19 BRPM | OXYGEN SATURATION: 97 % | HEART RATE: 98 BPM | DIASTOLIC BLOOD PRESSURE: 63 MMHG | SYSTOLIC BLOOD PRESSURE: 107 MMHG | HEIGHT: 61 IN

## 2025-03-02 DIAGNOSIS — Z98.890 OTHER SPECIFIED POSTPROCEDURAL STATES: Chronic | ICD-10-CM

## 2025-03-02 LAB
ALBUMIN SERPL ELPH-MCNC: 3.7 G/DL — SIGNIFICANT CHANGE UP (ref 3.5–5)
ALP SERPL-CCNC: 102 U/L — SIGNIFICANT CHANGE UP (ref 40–120)
ALT FLD-CCNC: 24 U/L DA — SIGNIFICANT CHANGE UP (ref 10–60)
ANION GAP SERPL CALC-SCNC: 6 MMOL/L — SIGNIFICANT CHANGE UP (ref 5–17)
APPEARANCE UR: ABNORMAL
AST SERPL-CCNC: 17 U/L — SIGNIFICANT CHANGE UP (ref 10–40)
BACTERIA # UR AUTO: ABNORMAL /HPF
BASOPHILS # BLD AUTO: 0.03 K/UL — SIGNIFICANT CHANGE UP (ref 0–0.2)
BASOPHILS NFR BLD AUTO: 0.5 % — SIGNIFICANT CHANGE UP (ref 0–2)
BILIRUB SERPL-MCNC: 0.4 MG/DL — SIGNIFICANT CHANGE UP (ref 0.2–1.2)
BILIRUB UR-MCNC: NEGATIVE — SIGNIFICANT CHANGE UP
BUN SERPL-MCNC: 20 MG/DL — HIGH (ref 7–18)
CALCIUM SERPL-MCNC: 8.1 MG/DL — LOW (ref 8.4–10.5)
CHLORIDE SERPL-SCNC: 108 MMOL/L — SIGNIFICANT CHANGE UP (ref 96–108)
CK SERPL-CCNC: 45 U/L — SIGNIFICANT CHANGE UP (ref 21–215)
CO2 SERPL-SCNC: 21 MMOL/L — LOW (ref 22–31)
COLOR SPEC: SIGNIFICANT CHANGE UP
COMMENT - URINE: SIGNIFICANT CHANGE UP
CREAT SERPL-MCNC: 0.68 MG/DL — SIGNIFICANT CHANGE UP (ref 0.5–1.3)
DIFF PNL FLD: NEGATIVE — SIGNIFICANT CHANGE UP
EGFR: 110 ML/MIN/1.73M2 — SIGNIFICANT CHANGE UP
EOSINOPHIL # BLD AUTO: 0.23 K/UL — SIGNIFICANT CHANGE UP (ref 0–0.5)
EOSINOPHIL NFR BLD AUTO: 3.5 % — SIGNIFICANT CHANGE UP (ref 0–6)
EPI CELLS # UR: PRESENT
FLUAV AG NPH QL: SIGNIFICANT CHANGE UP
FLUBV AG NPH QL: SIGNIFICANT CHANGE UP
GLUCOSE SERPL-MCNC: 122 MG/DL — HIGH (ref 70–99)
GLUCOSE UR QL: NEGATIVE MG/DL — SIGNIFICANT CHANGE UP
HCG SERPL-ACNC: <1 MIU/ML — SIGNIFICANT CHANGE UP
HCT VFR BLD CALC: 37.7 % — SIGNIFICANT CHANGE UP (ref 34.5–45)
HGB BLD-MCNC: 12.2 G/DL — SIGNIFICANT CHANGE UP (ref 11.5–15.5)
IMM GRANULOCYTES NFR BLD AUTO: 0.5 % — SIGNIFICANT CHANGE UP (ref 0–0.9)
KETONES UR-MCNC: 40 MG/DL
LEUKOCYTE ESTERASE UR-ACNC: NEGATIVE — SIGNIFICANT CHANGE UP
LIDOCAIN IGE QN: 37 U/L — SIGNIFICANT CHANGE UP (ref 13–75)
LYMPHOCYTES # BLD AUTO: 0.36 K/UL — LOW (ref 1–3.3)
LYMPHOCYTES # BLD AUTO: 5.5 % — LOW (ref 13–44)
MAGNESIUM SERPL-MCNC: 1.8 MG/DL — SIGNIFICANT CHANGE UP (ref 1.6–2.6)
MCHC RBC-ENTMCNC: 27.7 PG — SIGNIFICANT CHANGE UP (ref 27–34)
MCHC RBC-ENTMCNC: 32.4 G/DL — SIGNIFICANT CHANGE UP (ref 32–36)
MCV RBC AUTO: 85.7 FL — SIGNIFICANT CHANGE UP (ref 80–100)
MONOCYTES # BLD AUTO: 0.59 K/UL — SIGNIFICANT CHANGE UP (ref 0–0.9)
MONOCYTES NFR BLD AUTO: 9 % — SIGNIFICANT CHANGE UP (ref 2–14)
NEUTROPHILS # BLD AUTO: 5.32 K/UL — SIGNIFICANT CHANGE UP (ref 1.8–7.4)
NEUTROPHILS NFR BLD AUTO: 81 % — HIGH (ref 43–77)
NITRITE UR-MCNC: NEGATIVE — SIGNIFICANT CHANGE UP
NRBC BLD AUTO-RTO: 0 /100 WBCS — SIGNIFICANT CHANGE UP (ref 0–0)
PH UR: 5.5 — SIGNIFICANT CHANGE UP (ref 5–8)
PLATELET # BLD AUTO: 316 K/UL — SIGNIFICANT CHANGE UP (ref 150–400)
POTASSIUM SERPL-MCNC: 3.2 MMOL/L — LOW (ref 3.5–5.3)
POTASSIUM SERPL-SCNC: 3.2 MMOL/L — LOW (ref 3.5–5.3)
PROT SERPL-MCNC: 7.8 G/DL — SIGNIFICANT CHANGE UP (ref 6–8.3)
PROT UR-MCNC: 30 MG/DL
RBC # BLD: 4.4 M/UL — SIGNIFICANT CHANGE UP (ref 3.8–5.2)
RBC # FLD: 16.7 % — HIGH (ref 10.3–14.5)
RBC CASTS # UR COMP ASSIST: 1 /HPF — SIGNIFICANT CHANGE UP (ref 0–4)
RSV RNA NPH QL NAA+NON-PROBE: SIGNIFICANT CHANGE UP
SARS-COV-2 RNA SPEC QL NAA+PROBE: SIGNIFICANT CHANGE UP
SODIUM SERPL-SCNC: 135 MMOL/L — SIGNIFICANT CHANGE UP (ref 135–145)
SP GR SPEC: 1.04 — HIGH (ref 1–1.03)
TROPONIN I, HIGH SENSITIVITY RESULT: <3 NG/L — SIGNIFICANT CHANGE UP
UROBILINOGEN FLD QL: 0.2 MG/DL — SIGNIFICANT CHANGE UP (ref 0.2–1)
WBC # BLD: 6.56 K/UL — SIGNIFICANT CHANGE UP (ref 3.8–10.5)
WBC # FLD AUTO: 6.56 K/UL — SIGNIFICANT CHANGE UP (ref 3.8–10.5)
WBC UR QL: 1 /HPF — SIGNIFICANT CHANGE UP (ref 0–5)

## 2025-03-02 PROCEDURE — 84484 ASSAY OF TROPONIN QUANT: CPT

## 2025-03-02 PROCEDURE — 82550 ASSAY OF CK (CPK): CPT

## 2025-03-02 PROCEDURE — 96361 HYDRATE IV INFUSION ADD-ON: CPT

## 2025-03-02 PROCEDURE — 84702 CHORIONIC GONADOTROPIN TEST: CPT

## 2025-03-02 PROCEDURE — 36415 COLL VENOUS BLD VENIPUNCTURE: CPT

## 2025-03-02 PROCEDURE — 83735 ASSAY OF MAGNESIUM: CPT

## 2025-03-02 PROCEDURE — 83690 ASSAY OF LIPASE: CPT

## 2025-03-02 PROCEDURE — 80053 COMPREHEN METABOLIC PANEL: CPT

## 2025-03-02 PROCEDURE — 76705 ECHO EXAM OF ABDOMEN: CPT

## 2025-03-02 PROCEDURE — 85025 COMPLETE CBC W/AUTO DIFF WBC: CPT

## 2025-03-02 PROCEDURE — 99284 EMERGENCY DEPT VISIT MOD MDM: CPT | Mod: 25

## 2025-03-02 PROCEDURE — 81001 URINALYSIS AUTO W/SCOPE: CPT

## 2025-03-02 PROCEDURE — 87086 URINE CULTURE/COLONY COUNT: CPT

## 2025-03-02 PROCEDURE — 96374 THER/PROPH/DIAG INJ IV PUSH: CPT

## 2025-03-02 PROCEDURE — 87637 SARSCOV2&INF A&B&RSV AMP PRB: CPT

## 2025-03-02 PROCEDURE — 99284 EMERGENCY DEPT VISIT MOD MDM: CPT

## 2025-03-02 PROCEDURE — 76705 ECHO EXAM OF ABDOMEN: CPT | Mod: 26

## 2025-03-02 RX ORDER — ACETAMINOPHEN 500 MG/5ML
1000 LIQUID (ML) ORAL ONCE
Refills: 0 | Status: COMPLETED | OUTPATIENT
Start: 2025-03-02 | End: 2025-03-02

## 2025-03-02 RX ADMIN — Medication 400 MILLIGRAM(S): at 18:58

## 2025-03-02 RX ADMIN — Medication 1000 MILLIGRAM(S): at 19:13

## 2025-03-02 RX ADMIN — Medication 40 MILLIEQUIVALENT(S): at 20:28

## 2025-03-02 RX ADMIN — Medication 1000 MILLILITER(S): at 19:58

## 2025-03-02 RX ADMIN — Medication 1000 MILLILITER(S): at 18:58

## 2025-03-02 RX ADMIN — Medication 1000 MILLIGRAM(S): at 19:28

## 2025-03-02 NOTE — ED PROVIDER NOTE - PHYSICAL EXAMINATION
abdomen soft vague right upper quadrant tenderness palpation rest with abdomen is soft and nontender.  Heart regular lungs clear awake alert

## 2025-03-02 NOTE — ED PROVIDER NOTE - NSFOLLOWUPINSTRUCTIONS_ED_ALL_ED_FT
Diarrhea, Adult  Diarrhea is frequent loose and sometimes watery bowel movements. Diarrhea can make you feel weak and cause you to become dehydrated. Dehydration is a condition in which there is not enough water or other fluids in the body. Dehydration can make you tired and thirsty, cause you to have a dry mouth, and decrease how often you urinate.    Diarrhea typically lasts 2–3 days. However, it can last longer if it is a sign of something more serious. It is important to treat your diarrhea as told by your health care provider.    Follow these instructions at home:  Eating and drinking    A bottle of clear fruit juice and glass of water.   Bread, rice, and cereal from the grain group.  Follow these recommendations as told by your health care provider:  Take an oral rehydration solution (ORS). This is an over-the-counter medicine that helps return your body to its normal balance of nutrients and water. It is found at pharmacies and retail stores.  Drink enough fluid to keep your urine pale yellow.  Drink fluids such as water, diluted fruit juice, and low-calorie sports drinks. You can drink milk also, if desired. Sucking on ice chips is another way to get fluids.  Avoid drinking fluids that contain a lot of sugar or caffeine, such as soda, energy drinks, and regular sports drinks.  Avoid alcohol.  Eat bland, easy-to-digest foods in small amounts as you are able. These foods include bananas, applesauce, rice, lean meats, toast, and crackers.  Avoid spicy or fatty foods.  Medicines    Take over-the-counter and prescription medicines only as told by your health care provider.  If you were prescribed antibiotics, take them as told by your health care provider. Do not stop using the antibiotic even if you start to feel better.  General instructions    Washing hands with soap and water.  Wash your hands often using soap and water for at least 20 seconds. If soap and water are not available, use hand . Others in the household should wash their hands as well. Hands should be washed:  After using the toilet or changing a diaper.  Before preparing, cooking, or serving food.  While caring for a sick person or while visiting someone in a hospital.  Rest at home while you recover.  Take a warm bath to relieve any burning or pain from frequent diarrhea episodes.  Watch your condition for any changes.  Contact a health care provider if:  You have a fever.  Your diarrhea gets worse.  You have new symptoms.  You vomit every time you eat or drink.  You feel light-headed, dizzy, or have a headache.  You have muscle cramps.  You have signs of dehydration, such as:  Dark urine, very little urine, or no urine.  Cracked lips.  Dry mouth.  Sunken eyes.  Sleepiness.  Weakness.  You have bloody or black stools or stools that look like tar.  You have severe pain, cramping, or bloating in your abdomen.  Your skin feels cold and clammy.  You feel confused.  Get help right away if:  You have chest pain or your heart is beating very quickly.  You have trouble breathing or you are breathing very quickly.  You feel extremely weak or you faint.  These symptoms may be an emergency. Get help right away. Call 911.  Do not wait to see if the symptoms will go away.  Do not drive yourself to the hospital.  This information is not intended to replace advice given to you by your health care provider. Make sure you discuss any questions you have with your health care provider.    Document Revised: 06/06/2023 Document Reviewed: 06/06/2023  [employer] Patient Education © 2024 [employer]

## 2025-03-02 NOTE — ED PROVIDER NOTE - CLINICAL SUMMARY MEDICAL DECISION MAKING FREE TEXT BOX
patient with diarrhea abdominal pain reported.  Fever at home.  Labs reveal low potassium.  Repeat abdominal exam is nontender.  Will replete potassium Home meds return precautions. patient with diarrhea abdominal pain reported.  Fever at home.  Labs reveal low potassium.  Repeat abdominal exam is nontender.  Will replete potassium Home meds return precautions.  Blood pressure systolic in the low 100s.  Patient states her blood pressure is always low and she is been told by her doctor this in the past.  Patient denies dizziness or any other complaints at this time

## 2025-03-02 NOTE — ED PROVIDER NOTE - PATIENT PORTAL LINK FT
You can access the FollowMyHealth Patient Portal offered by [facility] by registering at the following website: [email or web address]. By joining [employer]’s FollowMyHealth portal, you will also be able to view your health information using other applications (apps) compatible with our system.

## 2025-03-02 NOTE — ED PROVIDER NOTE - OBJECTIVE STATEMENT
44-year-old female history intracranial aneurysm repair presents with diarrhea about 6 episodes since yesterday.  Diarrhea is nonbloody.  She had some fever of 102 earlier today and abdominal pain in the right upper region. no vomiting no urinary symptoms

## 2025-03-02 NOTE — ED ADULT TRIAGE NOTE - CHIEF COMPLAINT QUOTE
RUQ abd pain , fever , chills , diarrhea started yesterday  Family members had same symptoms last week  Pt took Tylenol at 1430 today

## 2025-03-03 LAB
CULTURE RESULTS: SIGNIFICANT CHANGE UP
SPECIMEN SOURCE: SIGNIFICANT CHANGE UP

## 2025-04-07 PROBLEM — J45.909 ASTHMA: Status: RESOLVED | Noted: 2024-07-23 | Resolved: 2025-04-07

## 2025-04-17 ENCOUNTER — APPOINTMENT (OUTPATIENT)
Dept: NEUROSURGERY | Facility: CLINIC | Age: 45
End: 2025-04-17
Payer: COMMERCIAL

## 2025-04-17 VITALS
SYSTOLIC BLOOD PRESSURE: 123 MMHG | HEIGHT: 61 IN | WEIGHT: 165 LBS | OXYGEN SATURATION: 96 % | RESPIRATION RATE: 18 BRPM | TEMPERATURE: 97.7 F | HEART RATE: 71 BPM | BODY MASS INDEX: 31.15 KG/M2 | DIASTOLIC BLOOD PRESSURE: 75 MMHG

## 2025-04-17 DIAGNOSIS — I72.0 ANEURYSM OF CAROTID ARTERY: ICD-10-CM

## 2025-04-17 DIAGNOSIS — Z78.9 OTHER SPECIFIED HEALTH STATUS: ICD-10-CM

## 2025-04-17 DIAGNOSIS — G45.9 TRANSIENT CEREBRAL ISCHEMIC ATTACK, UNSPECIFIED: ICD-10-CM

## 2025-04-17 DIAGNOSIS — J45.909 UNSPECIFIED ASTHMA, UNCOMPLICATED: ICD-10-CM

## 2025-04-17 PROCEDURE — 99212 OFFICE O/P EST SF 10 MIN: CPT

## 2025-05-18 NOTE — OCCUPATIONAL THERAPY INITIAL EVALUATION ADULT - LIGHT TOUCH SENSATION, HEAD/NECK, REHAB EVAL
Department of Obstetrics and Gynecology  VAGINAL DELIVERY  Procedure Note      Gestational Status:  Term pregnancy, Spontaneous labor, and Single fetus     Anesthesia:  Local or Epidural    Delivery Summary:      Delivery Type: normal spontaneous vaginal delivery  at term    Gender: Female infant.    Weight:  7 lbs.,  0 oz.    Birth Time: 1706    Apgars: (9 - 9)    Remarks:    Second degree episiotomy. RML.  Suture used for repair:  Chromic 2.0.    Nuchal Cord: was not present    Disposition: PP Floor.     Estimated blood loss: 350 cc.    Condition:  infant stable to general nursery and mother stable    Attending Attestation: I performed the procedure.    Mustapha Coker MD, FACOG    within normal limits

## 2025-06-03 ENCOUNTER — APPOINTMENT (OUTPATIENT)
Dept: INTERNAL MEDICINE | Facility: CLINIC | Age: 45
End: 2025-06-03
Payer: COMMERCIAL

## 2025-06-03 VITALS
BODY MASS INDEX: 32.47 KG/M2 | DIASTOLIC BLOOD PRESSURE: 68 MMHG | RESPIRATION RATE: 16 BRPM | TEMPERATURE: 97.6 F | OXYGEN SATURATION: 98 % | WEIGHT: 172 LBS | HEART RATE: 66 BPM | HEIGHT: 61 IN | SYSTOLIC BLOOD PRESSURE: 105 MMHG

## 2025-06-03 DIAGNOSIS — G45.9 TRANSIENT CEREBRAL ISCHEMIC ATTACK, UNSPECIFIED: ICD-10-CM

## 2025-06-03 DIAGNOSIS — D50.9 IRON DEFICIENCY ANEMIA, UNSPECIFIED: ICD-10-CM

## 2025-06-03 DIAGNOSIS — Z23 ENCOUNTER FOR IMMUNIZATION: ICD-10-CM

## 2025-06-03 DIAGNOSIS — J45.909 UNSPECIFIED ASTHMA, UNCOMPLICATED: ICD-10-CM

## 2025-06-03 PROCEDURE — 90471 IMMUNIZATION ADMIN: CPT

## 2025-06-03 PROCEDURE — 90715 TDAP VACCINE 7 YRS/> IM: CPT

## 2025-06-03 PROCEDURE — 99213 OFFICE O/P EST LOW 20 MIN: CPT | Mod: 25

## 2025-06-05 ENCOUNTER — APPOINTMENT (OUTPATIENT)
Dept: INTERNAL MEDICINE | Facility: CLINIC | Age: 45
End: 2025-06-05
Payer: COMMERCIAL

## 2025-06-05 DIAGNOSIS — E78.5 HYPERLIPIDEMIA, UNSPECIFIED: ICD-10-CM

## 2025-06-05 DIAGNOSIS — R73.09 OTHER ABNORMAL GLUCOSE: ICD-10-CM

## 2025-06-05 PROCEDURE — 36415 COLL VENOUS BLD VENIPUNCTURE: CPT

## 2025-06-08 LAB
25(OH)D3 SERPL-MCNC: 25.1 NG/ML
ALBUMIN SERPL ELPH-MCNC: 4.3 G/DL
ALP BLD-CCNC: 99 U/L
ALT SERPL-CCNC: 19 U/L
ANION GAP SERPL CALC-SCNC: 15 MMOL/L
APPEARANCE: CLEAR
AST SERPL-CCNC: 17 U/L
BACTERIA: ABNORMAL /HPF
BASOPHILS # BLD AUTO: 0.08 K/UL
BASOPHILS NFR BLD AUTO: 1.3 %
BILIRUB SERPL-MCNC: 0.6 MG/DL
BILIRUBIN URINE: NEGATIVE
BLOOD URINE: ABNORMAL
BUN SERPL-MCNC: 16 MG/DL
CALCIUM SERPL-MCNC: 9.1 MG/DL
CAST: NORMAL /LPF
CHLORIDE SERPL-SCNC: 105 MMOL/L
CHOLEST SERPL-MCNC: 198 MG/DL
CO2 SERPL-SCNC: 20 MMOL/L
COLOR: YELLOW
CREAT SERPL-MCNC: 0.56 MG/DL
CREAT SPEC-SCNC: 56 MG/DL
EGFRCR SERPLBLD CKD-EPI 2021: 115 ML/MIN/1.73M2
EOSINOPHIL # BLD AUTO: 0.4 K/UL
EOSINOPHIL NFR BLD AUTO: 6.6 %
EPITHELIAL CELLS: 9 /HPF
ESTIMATED AVERAGE GLUCOSE: 126 MG/DL
GGT SERPL-CCNC: 10 U/L
GLUCOSE QUALITATIVE U: NEGATIVE MG/DL
GLUCOSE SERPL-MCNC: 98 MG/DL
HBA1C MFR BLD HPLC: 6 %
HCT VFR BLD CALC: 40.2 %
HDLC SERPL-MCNC: 69 MG/DL
HGB BLD-MCNC: 12.3 G/DL
IMM GRANULOCYTES NFR BLD AUTO: 0 %
KETONES URINE: NEGATIVE MG/DL
LDLC SERPL-MCNC: 114 MG/DL
LEUKOCYTE ESTERASE URINE: ABNORMAL
LYMPHOCYTES # BLD AUTO: 1.85 K/UL
LYMPHOCYTES NFR BLD AUTO: 30.5 %
MAN DIFF?: NORMAL
MCHC RBC-ENTMCNC: 27.7 PG
MCHC RBC-ENTMCNC: 30.6 G/DL
MCV RBC AUTO: 90.5 FL
MICROALBUMIN 24H UR DL<=1MG/L-MCNC: <1.2 MG/DL
MICROALBUMIN/CREAT 24H UR-RTO: NORMAL MG/G
MICROSCOPIC-UA: NORMAL
MONOCYTES # BLD AUTO: 0.56 K/UL
MONOCYTES NFR BLD AUTO: 9.2 %
NEUTROPHILS # BLD AUTO: 3.17 K/UL
NEUTROPHILS NFR BLD AUTO: 52.4 %
NITRITE URINE: NEGATIVE
NONHDLC SERPL-MCNC: 129 MG/DL
PH URINE: 7.5
PLATELET # BLD AUTO: 358 K/UL
POTASSIUM SERPL-SCNC: 4.6 MMOL/L
PROT SERPL-MCNC: 7 G/DL
PROTEIN URINE: NEGATIVE MG/DL
RBC # BLD: 4.44 M/UL
RBC # FLD: 17.5 %
RED BLOOD CELLS URINE: 2 /HPF
REVIEW: NORMAL
SODIUM SERPL-SCNC: 140 MMOL/L
SPECIFIC GRAVITY URINE: 1.02
TRIGL SERPL-MCNC: 79 MG/DL
TSH SERPL-ACNC: 1.47 UIU/ML
UROBILINOGEN URINE: 0.2 MG/DL
WBC # FLD AUTO: 6.06 K/UL
WHITE BLOOD CELLS URINE: 11 /HPF

## 2025-06-13 ENCOUNTER — NON-APPOINTMENT (OUTPATIENT)
Age: 45
End: 2025-06-13

## 2025-06-20 ENCOUNTER — EMERGENCY (EMERGENCY)
Facility: HOSPITAL | Age: 45
LOS: 1 days | End: 2025-06-20
Attending: STUDENT IN AN ORGANIZED HEALTH CARE EDUCATION/TRAINING PROGRAM
Payer: COMMERCIAL

## 2025-06-20 VITALS
HEART RATE: 70 BPM | SYSTOLIC BLOOD PRESSURE: 117 MMHG | RESPIRATION RATE: 18 BRPM | DIASTOLIC BLOOD PRESSURE: 80 MMHG | OXYGEN SATURATION: 97 % | TEMPERATURE: 99 F

## 2025-06-20 VITALS
HEIGHT: 61 IN | HEART RATE: 77 BPM | WEIGHT: 171.08 LBS | RESPIRATION RATE: 19 BRPM | DIASTOLIC BLOOD PRESSURE: 66 MMHG | TEMPERATURE: 98 F | SYSTOLIC BLOOD PRESSURE: 101 MMHG | OXYGEN SATURATION: 97 %

## 2025-06-20 DIAGNOSIS — Z98.890 OTHER SPECIFIED POSTPROCEDURAL STATES: Chronic | ICD-10-CM

## 2025-06-20 LAB
ALBUMIN SERPL ELPH-MCNC: 3.9 G/DL — SIGNIFICANT CHANGE UP (ref 3.5–5)
ALP SERPL-CCNC: 97 U/L — SIGNIFICANT CHANGE UP (ref 40–120)
ALT FLD-CCNC: 26 U/L DA — SIGNIFICANT CHANGE UP (ref 10–60)
ANION GAP SERPL CALC-SCNC: 5 MMOL/L — SIGNIFICANT CHANGE UP (ref 5–17)
AST SERPL-CCNC: 18 U/L — SIGNIFICANT CHANGE UP (ref 10–40)
BASE EXCESS BLDV CALC-SCNC: 1 MMOL/L — SIGNIFICANT CHANGE UP
BASOPHILS # BLD AUTO: 0.11 K/UL — SIGNIFICANT CHANGE UP (ref 0–0.2)
BASOPHILS NFR BLD AUTO: 1.3 % — SIGNIFICANT CHANGE UP (ref 0–2)
BILIRUB SERPL-MCNC: 0.7 MG/DL — SIGNIFICANT CHANGE UP (ref 0.2–1.2)
BUN SERPL-MCNC: 14 MG/DL — SIGNIFICANT CHANGE UP (ref 7–18)
CA-I SERPL-SCNC: SIGNIFICANT CHANGE UP MMOL/L (ref 1.15–1.33)
CALCIUM SERPL-MCNC: 9.4 MG/DL — SIGNIFICANT CHANGE UP (ref 8.4–10.5)
CHLORIDE SERPL-SCNC: 104 MMOL/L — SIGNIFICANT CHANGE UP (ref 96–108)
CO2 SERPL-SCNC: 25 MMOL/L — SIGNIFICANT CHANGE UP (ref 22–31)
CREAT SERPL-MCNC: 0.65 MG/DL — SIGNIFICANT CHANGE UP (ref 0.5–1.3)
EGFR: 111 ML/MIN/1.73M2 — SIGNIFICANT CHANGE UP
EGFR: 111 ML/MIN/1.73M2 — SIGNIFICANT CHANGE UP
EOSINOPHIL # BLD AUTO: 0.61 K/UL — HIGH (ref 0–0.5)
EOSINOPHIL NFR BLD AUTO: 7.4 % — HIGH (ref 0–6)
FLUAV AG NPH QL: SIGNIFICANT CHANGE UP
FLUBV AG NPH QL: SIGNIFICANT CHANGE UP
GAS PNL BLDV: 135 MMOL/L — LOW (ref 136–145)
GAS PNL BLDV: SIGNIFICANT CHANGE UP
GLUCOSE BLDV-MCNC: 99 MG/DL — SIGNIFICANT CHANGE UP (ref 70–99)
GLUCOSE SERPL-MCNC: 96 MG/DL — SIGNIFICANT CHANGE UP (ref 70–99)
HCG SERPL-ACNC: <1 MIU/ML — SIGNIFICANT CHANGE UP
HCO3 BLDV-SCNC: 25 MMOL/L — SIGNIFICANT CHANGE UP (ref 22–29)
HCT VFR BLD CALC: 38.8 % — SIGNIFICANT CHANGE UP (ref 34.5–45)
HGB BLD-MCNC: 12.9 G/DL — SIGNIFICANT CHANGE UP (ref 11.5–15.5)
IMM GRANULOCYTES NFR BLD AUTO: 0.2 % — SIGNIFICANT CHANGE UP (ref 0–0.9)
LACTATE BLDV-MCNC: 1.1 MMOL/L — SIGNIFICANT CHANGE UP (ref 0.5–2)
LYMPHOCYTES # BLD AUTO: 2.65 K/UL — SIGNIFICANT CHANGE UP (ref 1–3.3)
LYMPHOCYTES # BLD AUTO: 32.2 % — SIGNIFICANT CHANGE UP (ref 13–44)
MCHC RBC-ENTMCNC: 28.7 PG — SIGNIFICANT CHANGE UP (ref 27–34)
MCHC RBC-ENTMCNC: 33.2 G/DL — SIGNIFICANT CHANGE UP (ref 32–36)
MCV RBC AUTO: 86.2 FL — SIGNIFICANT CHANGE UP (ref 80–100)
MONOCYTES # BLD AUTO: 0.61 K/UL — SIGNIFICANT CHANGE UP (ref 0–0.9)
MONOCYTES NFR BLD AUTO: 7.4 % — SIGNIFICANT CHANGE UP (ref 2–14)
NEUTROPHILS # BLD AUTO: 4.22 K/UL — SIGNIFICANT CHANGE UP (ref 1.8–7.4)
NEUTROPHILS NFR BLD AUTO: 51.5 % — SIGNIFICANT CHANGE UP (ref 43–77)
NRBC BLD AUTO-RTO: 0 /100 WBCS — SIGNIFICANT CHANGE UP (ref 0–0)
PCO2 BLDV: 38 MMHG — LOW (ref 39–42)
PH BLDV: 7.43 — SIGNIFICANT CHANGE UP (ref 7.32–7.43)
PLATELET # BLD AUTO: 349 K/UL — SIGNIFICANT CHANGE UP (ref 150–400)
PO2 BLDV: 25 MMHG — SIGNIFICANT CHANGE UP
POTASSIUM BLDV-SCNC: 4.4 MMOL/L — SIGNIFICANT CHANGE UP (ref 3.5–5.1)
POTASSIUM SERPL-MCNC: 4 MMOL/L — SIGNIFICANT CHANGE UP (ref 3.5–5.3)
POTASSIUM SERPL-SCNC: 4 MMOL/L — SIGNIFICANT CHANGE UP (ref 3.5–5.3)
PROT SERPL-MCNC: 7.9 G/DL — SIGNIFICANT CHANGE UP (ref 6–8.3)
RBC # BLD: 4.5 M/UL — SIGNIFICANT CHANGE UP (ref 3.8–5.2)
RBC # FLD: 16.2 % — HIGH (ref 10.3–14.5)
RSV RNA NPH QL NAA+NON-PROBE: SIGNIFICANT CHANGE UP
SAO2 % BLDV: 42.1 % — SIGNIFICANT CHANGE UP
SARS-COV-2 RNA SPEC QL NAA+PROBE: SIGNIFICANT CHANGE UP
SODIUM SERPL-SCNC: 134 MMOL/L — LOW (ref 135–145)
SOURCE RESPIRATORY: SIGNIFICANT CHANGE UP
WBC # BLD: 8.22 K/UL — SIGNIFICANT CHANGE UP (ref 3.8–10.5)
WBC # FLD AUTO: 8.22 K/UL — SIGNIFICANT CHANGE UP (ref 3.8–10.5)

## 2025-06-20 PROCEDURE — 99285 EMERGENCY DEPT VISIT HI MDM: CPT

## 2025-06-20 PROCEDURE — 80053 COMPREHEN METABOLIC PANEL: CPT

## 2025-06-20 PROCEDURE — 83605 ASSAY OF LACTIC ACID: CPT

## 2025-06-20 PROCEDURE — 84702 CHORIONIC GONADOTROPIN TEST: CPT

## 2025-06-20 PROCEDURE — 82330 ASSAY OF CALCIUM: CPT

## 2025-06-20 PROCEDURE — 84295 ASSAY OF SERUM SODIUM: CPT

## 2025-06-20 PROCEDURE — 87637 SARSCOV2&INF A&B&RSV AMP PRB: CPT

## 2025-06-20 PROCEDURE — 96374 THER/PROPH/DIAG INJ IV PUSH: CPT | Mod: XU

## 2025-06-20 PROCEDURE — 70496 CT ANGIOGRAPHY HEAD: CPT | Mod: 26

## 2025-06-20 PROCEDURE — 82947 ASSAY GLUCOSE BLOOD QUANT: CPT

## 2025-06-20 PROCEDURE — 71046 X-RAY EXAM CHEST 2 VIEWS: CPT

## 2025-06-20 PROCEDURE — 70496 CT ANGIOGRAPHY HEAD: CPT

## 2025-06-20 PROCEDURE — 96375 TX/PRO/DX INJ NEW DRUG ADDON: CPT | Mod: XU

## 2025-06-20 PROCEDURE — 84132 ASSAY OF SERUM POTASSIUM: CPT

## 2025-06-20 PROCEDURE — 70498 CT ANGIOGRAPHY NECK: CPT | Mod: 26

## 2025-06-20 PROCEDURE — 0241U: CPT

## 2025-06-20 PROCEDURE — 71046 X-RAY EXAM CHEST 2 VIEWS: CPT | Mod: 26

## 2025-06-20 PROCEDURE — 99284 EMERGENCY DEPT VISIT MOD MDM: CPT | Mod: 25

## 2025-06-20 PROCEDURE — 82803 BLOOD GASES ANY COMBINATION: CPT

## 2025-06-20 PROCEDURE — 85025 COMPLETE CBC W/AUTO DIFF WBC: CPT

## 2025-06-20 PROCEDURE — 94640 AIRWAY INHALATION TREATMENT: CPT

## 2025-06-20 PROCEDURE — 36415 COLL VENOUS BLD VENIPUNCTURE: CPT

## 2025-06-20 PROCEDURE — 70498 CT ANGIOGRAPHY NECK: CPT

## 2025-06-20 RX ORDER — PREDNISONE 20 MG/1
1 TABLET ORAL
Qty: 4 | Refills: 0
Start: 2025-06-20 | End: 2025-06-23

## 2025-06-20 RX ORDER — METOCLOPRAMIDE HCL 10 MG
10 TABLET ORAL ONCE
Refills: 0 | Status: COMPLETED | OUTPATIENT
Start: 2025-06-20 | End: 2025-06-20

## 2025-06-20 RX ORDER — METHYLPREDNISOLONE ACETATE 80 MG/ML
125 INJECTION, SUSPENSION INTRA-ARTICULAR; INTRALESIONAL; INTRAMUSCULAR; SOFT TISSUE ONCE
Refills: 0 | Status: COMPLETED | OUTPATIENT
Start: 2025-06-20 | End: 2025-06-20

## 2025-06-20 RX ORDER — IPRATROPIUM BROMIDE AND ALBUTEROL SULFATE .5; 2.5 MG/3ML; MG/3ML
3 SOLUTION RESPIRATORY (INHALATION)
Refills: 0 | Status: COMPLETED | OUTPATIENT
Start: 2025-06-20 | End: 2025-06-20

## 2025-06-20 RX ORDER — ALBUTEROL SULFATE 2.5 MG/3ML
2 VIAL, NEBULIZER (ML) INHALATION EVERY 6 HOURS
Refills: 0 | Status: DISCONTINUED | OUTPATIENT
Start: 2025-06-20 | End: 2025-06-24

## 2025-06-20 RX ADMIN — Medication 2 PUFF(S): at 18:23

## 2025-06-20 RX ADMIN — IPRATROPIUM BROMIDE AND ALBUTEROL SULFATE 3 MILLILITER(S): .5; 2.5 SOLUTION RESPIRATORY (INHALATION) at 17:06

## 2025-06-20 RX ADMIN — IPRATROPIUM BROMIDE AND ALBUTEROL SULFATE 3 MILLILITER(S): .5; 2.5 SOLUTION RESPIRATORY (INHALATION) at 16:16

## 2025-06-20 RX ADMIN — Medication 10 MILLIGRAM(S): at 16:11

## 2025-06-20 RX ADMIN — IPRATROPIUM BROMIDE AND ALBUTEROL SULFATE 3 MILLILITER(S): .5; 2.5 SOLUTION RESPIRATORY (INHALATION) at 16:12

## 2025-06-20 RX ADMIN — METHYLPREDNISOLONE ACETATE 125 MILLIGRAM(S): 80 INJECTION, SUSPENSION INTRA-ARTICULAR; INTRALESIONAL; INTRAMUSCULAR; SOFT TISSUE at 16:11

## 2025-06-20 NOTE — ED PROVIDER NOTE - PATIENT PORTAL LINK FT
You can access the FollowMyHealth Patient Portal offered by [facility] by registering at the following website: [email or web address]. By joining [employer]’s FollowMyHealth portal, you will also be able to view your health information using other applications (apps) compatible with our system.

## 2025-06-20 NOTE — ED PROVIDER NOTE - OBJECTIVE STATEMENT
45-year-old female with a past medical history of asthma, prior left ICA aneurysm and periophthalmic aneurysm, status post left crani left paraophthalmic ICA aneurysm clipping at [facility] in December, presenting due to concern of asthma exacerbation.  Reports for 1 week she has had worsening shortness of breath.  She takes albuterol inhaler every 2 hours for the past week.  No fever, chills, chest pain.  Also reports she has a left-sided headache that she describes as a 5 out of 10 over the past week.  She has not seen pulmonology before so does not take any inhaled corticosteroids.  She has no environmental allergies and has not been taking allergy medicine recently.

## 2025-06-20 NOTE — ED PROVIDER NOTE - CLINICAL SUMMARY MEDICAL DECISION MAKING FREE TEXT BOX
45-year-old female with a past medical history of asthma, prior left ICA aneurysm and periophthalmic aneurysm, status post left crani left paraophthalmic ICA aneurysm clipping at [facility] in December, presenting due to concern of asthma exacerbation. Patient presenting due to concern of asthma exacerbation.  Has not seen pulmonology, taking albuterol Hailer every 2 hours, given this we will give DuoNebs, methylprednisolone IV, chest x-ray to evaluate for pneumonia though low concern for this.  Given patient has headache prior repair of her aneurysm in december 2024 would get CTA to evaluate for recurrent aneurysm or other complication.  Will treat with Reglan.  Will check CBC, CMP, VBG.  Would recommend patient start daily antihistamine given known prior environmental allergies.  Patient may also require a course of steroids, no history of diabetes.  She likely needs follow-up with pulmonology to be started on inhaled corticosteroid at discharge.

## 2025-06-20 NOTE — ED PROVIDER NOTE - PROGRESS NOTE DETAILS
DO Kaushal, EM Attending: Patient reassessed reports feeling well.  Lungs clear to auscultation.  Chest x-ray reviewed without any findings of consolidation or free air.  Labs reviewed largely unremarkable.  Will discharge with pulmonary follow-up short course of oral steroids.  Patient amenable with plan.

## 2025-06-20 NOTE — ED ADULT NURSE NOTE - NSFALLUNIVINTERV_ED_ALL_ED
Bed/Stretcher in lowest position, wheels locked, appropriate side rails in place/Call bell, personal items and telephone in reach/Instruct patient to call for assistance before getting out of bed/chair/stretcher/Non-slip footwear applied when patient is off stretcher/[place] to call system/Physically safe environment - no spills, clutter or unnecessary equipment/Purposeful proactive rounding/Room/bathroom lighting operational, light cord in reach

## 2025-06-20 NOTE — ED PROVIDER NOTE - PHYSICAL EXAMINATION
General: well-appearing, no acute distress  Head: Atraumatic, normocephalic  Eyes: EOM grossly in tact, no scleral icterus, no discharge  ENT: moist mucous membranes  Neurology: A&Ox3, CN grossly intact. 5/5 strength and normal sensation throughout all four extremities. Normal FNF. No pronator drift. no facial asymmetry.   Respiratory: diffuse inspiratory and expiratory wheezing in all lung fields, mild inc respiratory effort  CV: RRR, good s1/s2, no S3, Extremities warm and well perfused  Extremities: No edema, no deformities  Skin: warm and dry. No rashes

## 2025-06-20 NOTE — ED PROVIDER NOTE - CARE PLAN
Principal Discharge DX:	Acute asthma exacerbation  Secondary Diagnosis:	Acute tension-type headache   1

## 2025-06-20 NOTE — ED ADULT TRIAGE NOTE - CHIEF COMPLAINT QUOTE
Pt c/o SOB x 1 week, worsening today. Pt reports she took her albuterol inhaler today with no relief. Denies chest pain. Pt speaking in full complete sentences at triage. Hx of asthma.

## 2025-06-20 NOTE — ED ADULT NURSE NOTE - CHPI ED NUR DURATION
Recep please call mom and schedule appointment for patient at 11:15 am on 07/02/2018 and appointment for patients father on 07/02/2018 at 11:45. Appointments ok'd per MD. Thanks.   week(s)

## 2025-06-20 NOTE — ED PROVIDER NOTE - NSFOLLOWUPINSTRUCTIONS_ED_ALL_ED_FT
Please follow up with your primary care physician within 2-3 days.   Return to the ER for any new or concerning symptoms.   You may take 975 mg acetaminophen every 6 hours as needed for pain.    Take albuterol 2 puffs every 4 hours for next 48 hours, then every 4 hours as needed for cough/shortness of breath. If you are requiring albuterol more frequently, please call your doctor or return to the ER.    Call 911 anytime you think you may need emergency care. For example, call if:    You have severe trouble breathing.    Call your doctor or seek immediate medical care if:    Your symptoms do not get better after you have used your inhalers.  You cough up yellow, dark brown, or bloody mucus (sputum). Please follow up with your primary care physician within 2-3 days.   Return to the ER for any new or concerning symptoms.   You may take 975 mg acetaminophen every 6 hours as needed for pain.    We sent a prescription for prednisone, an oral steroid, to your pharmacy.  Please take it in the morning for the next 4 days.    Take albuterol 2 puffs every 4 hours for next 48 hours, then every 4 hours as needed for cough/shortness of breath. If you are requiring albuterol more frequently, please call your doctor or return to the ER.    Call 911 anytime you think you may need emergency care. For example, call if:    You have severe trouble breathing.    Call your doctor or seek immediate medical care if:    Your symptoms do not get better after you have used your inhalers.  You cough up yellow, dark brown, or bloody mucus (sputum).

## 2025-06-26 ENCOUNTER — APPOINTMENT (OUTPATIENT)
Dept: NEUROLOGY | Facility: CLINIC | Age: 45
End: 2025-06-26

## 2025-07-07 NOTE — ED ADULT NURSE NOTE - NURSING NEURO ORIENTATION
[facility]     Endocrine follow up     CC:   Chief Complaint   Patient presents with    Diabetes    Thyroid Problem     PCP:Henna Panchal MD    HPI  20 y.o. female  has a past medical history of Diabetes mellitus (HCC), Hypertension, Hypoglycemia, POTS (postural orthostatic tachycardia syndrome), Thyroid activity decreased, and Vitamin D deficiency. here for follow up of   Chief Complaint   Patient presents with    Diabetes    Thyroid Problem     History:  Diagnosed with Type 1 diabetes: 2/2025 after presenting with DKA   Went to ER for fatigue, polyuria/urinary frequency   Hypothyroidism dx at age 8   Patient has no known history of thyroid surgery, radioactive iodine or ionizing radiation to the head or the neck.  No family history of thyroid cancer. No known history of thyroid nodule or prior FNA of thyroid.   Levothyroxine stopped at age, then euthyroid per pt report.     5/2025:  - The patient has not menstruated for several months but reports no possibility of pregnancy.  - They have been evaluated for polycystic ovary syndrome (PCOS) by a gynecologist at [facility], with negative findings.  - They have an upcoming appointment with a different gynecologist next week.  - The patient reports no hirsutism on their chin, chest, or inner thighs.  - A previous pelvic ultrasound did not reveal any ovarian cysts per patient   - They report no spotting or breakthrough bleeding.  - The patient has a history of elevated prolactin levels.     Today -   - pcp ordered celiac panel   - s/p RD eval   - s/p Gyn eval last month   - now again menstruating   - feeling well overall   - CGM reviewed   DM:  Lantus 24 units nightly  Humalog 1:15 + **Corrective Low Dose Algorithm**  Glucose: Dose:               No Insulin  140-199 1 Unit  200-249 2 Units  250-299 3 Units  300-349 4 Units  350-399 5 Units  Over 399 6 Units    BRIEF ROS   Gen: no fevers/chills  CV: no chest  oriented to person, place and time

## 2025-07-30 ENCOUNTER — APPOINTMENT (OUTPATIENT)
Dept: NEUROLOGY | Facility: CLINIC | Age: 45
End: 2025-07-30
Payer: COMMERCIAL

## 2025-07-30 PROCEDURE — 99213 OFFICE O/P EST LOW 20 MIN: CPT | Mod: 95

## 2025-07-30 PROCEDURE — G2211 COMPLEX E/M VISIT ADD ON: CPT | Mod: NC,95

## 2025-08-19 ENCOUNTER — APPOINTMENT (OUTPATIENT)
Dept: NEUROLOGY | Facility: CLINIC | Age: 45
End: 2025-08-19

## 2025-08-21 ENCOUNTER — APPOINTMENT (OUTPATIENT)
Dept: NEUROLOGY | Facility: CLINIC | Age: 45
End: 2025-08-21

## 2025-08-27 ENCOUNTER — LABORATORY RESULT (OUTPATIENT)
Age: 45
End: 2025-08-27

## 2025-08-27 ENCOUNTER — APPOINTMENT (OUTPATIENT)
Dept: PULMONOLOGY | Facility: CLINIC | Age: 45
End: 2025-08-27
Payer: COMMERCIAL

## 2025-08-27 VITALS
RESPIRATION RATE: 12 BRPM | BODY MASS INDEX: 32.66 KG/M2 | SYSTOLIC BLOOD PRESSURE: 117 MMHG | HEIGHT: 61 IN | DIASTOLIC BLOOD PRESSURE: 78 MMHG | TEMPERATURE: 98 F | HEART RATE: 67 BPM | WEIGHT: 173 LBS | OXYGEN SATURATION: 97 %

## 2025-08-27 PROCEDURE — 99205 OFFICE O/P NEW HI 60 MIN: CPT

## 2025-08-27 RX ORDER — BUDESONIDE AND FORMOTEROL FUMARATE DIHYDRATE 160; 4.5 UG/1; UG/1
160-4.5 AEROSOL RESPIRATORY (INHALATION) TWICE DAILY
Qty: 1 | Refills: 2 | Status: ACTIVE | COMMUNITY
Start: 2025-08-27 | End: 1900-01-01

## 2025-09-02 ENCOUNTER — APPOINTMENT (OUTPATIENT)
Dept: PULMONOLOGY | Facility: CLINIC | Age: 45
End: 2025-09-02

## 2025-09-03 LAB
A ALTERNATA IGE QN: 0.12 KUA/L
A FUMIGATUS IGE QN: 0.27 KUA/L
BASOPHILS # BLD AUTO: 0.1 K/UL
BASOPHILS NFR BLD AUTO: 1.5 %
C ALBICANS IGE QN: 0.38 KUA/L
C HERBARUM IGE QN: 0.21 KUA/L
CAT DANDER IGE QN: 3.07 KUA/L
COMMON RAGWEED IGE QN: 0.31 KUA/L
D FARINAE IGE QN: 21.7 KUA/L
D PTERONYSS IGE QN: 8.22 KUA/L
DEPRECATED A ALTERNATA IGE RAST QL: NORMAL
DEPRECATED A FUMIGATUS IGE RAST QL: NORMAL
DEPRECATED C ALBICANS IGE RAST QL: 1
DEPRECATED C HERBARUM IGE RAST QL: NORMAL
DEPRECATED CAT DANDER IGE RAST QL: 2
DEPRECATED COMMON RAGWEED IGE RAST QL: NORMAL
DEPRECATED D FARINAE IGE RAST QL: 4
DEPRECATED D PTERONYSS IGE RAST QL: 3
DEPRECATED DOG DANDER IGE RAST QL: 3
DEPRECATED M RACEMOSUS IGE RAST QL: 0
DEPRECATED ROACH IGE RAST QL: 3
DEPRECATED TIMOTHY IGE RAST QL: 3
DEPRECATED WHITE OAK IGE RAST QL: 3
DOG DANDER IGE QN: 9.26 KUA/L
EOSINOPHIL # BLD AUTO: 0.71 K/UL
EOSINOPHIL NFR BLD AUTO: 10.9 %
HCT VFR BLD CALC: 40.8 %
HGB BLD-MCNC: 13.1 G/DL
IMM GRANULOCYTES NFR BLD AUTO: 0.2 %
LYMPHOCYTES # BLD AUTO: 2.03 K/UL
LYMPHOCYTES NFR BLD AUTO: 31.1 %
M RACEMOSUS IGE QN: <0.1 KUA/L
MAN DIFF?: NORMAL
MCHC RBC-ENTMCNC: 29 PG
MCHC RBC-ENTMCNC: 32.1 G/DL
MCV RBC AUTO: 90.3 FL
MONOCYTES # BLD AUTO: 0.5 K/UL
MONOCYTES NFR BLD AUTO: 7.7 %
NEUTROPHILS # BLD AUTO: 3.17 K/UL
NEUTROPHILS NFR BLD AUTO: 48.6 %
PLATELET # BLD AUTO: 353 K/UL
RBC # BLD: 4.52 M/UL
RBC # FLD: 15.4 %
ROACH IGE QN: 5.86 KUA/L
TIMOTHY IGE QN: 4.13 KUA/L
TOTAL IGE SMQN RAST: 825 KU/L
WBC # FLD AUTO: 6.52 K/UL
WHITE OAK IGE QN: 15.4 KUA/L

## 2025-09-05 LAB — IGE AB SERPL QL: 351 NG/ML

## (undated) DEVICE — ARACHNOID BACKCUTTING LONG HANDLE 8"

## (undated) DEVICE — ELCTR COLORADO 3CM

## (undated) DEVICE — VENODYNE/SCD SLEEVE CALF MEDIUM

## (undated) DEVICE — BUR STRYKER MULTI FLUTE ROUND 2MM

## (undated) DEVICE — NEURO SURGICAL STRIP 1/2 X 6"

## (undated) DEVICE — DRAPE LIGHT HANDLE COVER (GREEN)

## (undated) DEVICE — STAPLER SKIN PROXIMATE

## (undated) DEVICE — MINI DOPPLER PROBE

## (undated) DEVICE — Device

## (undated) DEVICE — DRAPE MAYO STAND 30"

## (undated) DEVICE — NEURO SURGICAL STRIP 1/4 X 6"

## (undated) DEVICE — PERFORATOR SOPHYSA ALPHA 11-14-S

## (undated) DEVICE — BIPOLAR FORCEP KOGENT IRRIGATING STRAIGHT 0.5MM X 8" DISP

## (undated) DEVICE — BUR STRYKER DIAMOND ROUND 3.0MM

## (undated) DEVICE — WARMING BLANKET LOWER ADULT

## (undated) DEVICE — BIPOLAR FORCEP KOGENT IRRIGATING STRAIGHT 0.5MM X 7" DISP

## (undated) DEVICE — SUT VICRYL PLUS 2-0 18" CT-2 (POP-OFF)

## (undated) DEVICE — CLIPPER BLADE GENERAL USE

## (undated) DEVICE — ELCTR STRYKER NEPTUNE SMOKE EVACUATION PENCIL (GREEN)

## (undated) DEVICE — NDL SPINAL 18G X 3.5" (PINK)

## (undated) DEVICE — POSITIONER FOAM EGG CRATE ULNAR 2PCS (PINK)

## (undated) DEVICE — NEURO SURGICAL STRIP 1" X 6"

## (undated) DEVICE — BUR ROUTER MED

## (undated) DEVICE — NEPTUNE 4-PORT MANIFOLD STANDARD

## (undated) DEVICE — SUT NUROLON 4-0 8-18" TF (POP-OFF)

## (undated) DEVICE — ARACHNOID BACKCUTTING SUPERFICIAL HANDLE 5"

## (undated) DEVICE — PROBE PRASS SLIM MONOPOLAR STIMULATOR

## (undated) DEVICE — CODMAN RANEY SCALP CLIPS (BLUE) MEDIUM

## (undated) DEVICE — GLV 8 PROTEXIS (WHITE)

## (undated) DEVICE — AESCULAP SCALPFIX 10 CLIPS

## (undated) DEVICE — TUBING SUCTION 20FT

## (undated) DEVICE — LONE STAR RETRACTOR RING 12MM BLUNT DISP

## (undated) DEVICE — SET IV INFUSE NDL 23GAX.75 12" TUBING

## (undated) DEVICE — PACK CRANIOTOMY LNX SURGICOUNT

## (undated) DEVICE — RUBBERBAND STRL LTX FR 200/CA 3X1/8IN

## (undated) DEVICE — DRAPE MICROSCOPE EXOSCOPE 12" X 79"